# Patient Record
Sex: FEMALE | Race: WHITE | NOT HISPANIC OR LATINO | Employment: FULL TIME | URBAN - METROPOLITAN AREA
[De-identification: names, ages, dates, MRNs, and addresses within clinical notes are randomized per-mention and may not be internally consistent; named-entity substitution may affect disease eponyms.]

---

## 2024-06-10 ENCOUNTER — HOSPITAL ENCOUNTER (EMERGENCY)
Facility: HOSPITAL | Age: 34
Discharge: LEFT BEFORE DISCHARGE | End: 2024-06-10
Attending: EMERGENCY MEDICINE
Payer: COMMERCIAL

## 2024-06-10 ENCOUNTER — APPOINTMENT (OUTPATIENT)
Dept: RADIOLOGY | Facility: HOSPITAL | Age: 34
End: 2024-06-10
Payer: COMMERCIAL

## 2024-06-10 ENCOUNTER — APPOINTMENT (OUTPATIENT)
Dept: CARDIOLOGY | Facility: HOSPITAL | Age: 34
End: 2024-06-10
Payer: COMMERCIAL

## 2024-06-10 VITALS
HEIGHT: 64 IN | WEIGHT: 138.23 LBS | RESPIRATION RATE: 18 BRPM | SYSTOLIC BLOOD PRESSURE: 157 MMHG | DIASTOLIC BLOOD PRESSURE: 97 MMHG | OXYGEN SATURATION: 100 % | TEMPERATURE: 97.7 F | BODY MASS INDEX: 23.6 KG/M2 | HEART RATE: 69 BPM

## 2024-06-10 DIAGNOSIS — R10.30 LOWER ABDOMINAL PAIN: Primary | ICD-10-CM

## 2024-06-10 DIAGNOSIS — M54.50 ACUTE BILATERAL LOW BACK PAIN WITHOUT SCIATICA: ICD-10-CM

## 2024-06-10 LAB
ALBUMIN SERPL BCP-MCNC: 4.7 G/DL (ref 3.4–5)
ALP SERPL-CCNC: 117 U/L (ref 33–110)
ALT SERPL W P-5'-P-CCNC: 22 U/L (ref 7–45)
ANION GAP SERPL CALC-SCNC: 14 MMOL/L (ref 10–20)
APPEARANCE UR: CLEAR
AST SERPL W P-5'-P-CCNC: 39 U/L (ref 9–39)
BASOPHILS # BLD AUTO: 0.03 X10*3/UL (ref 0–0.1)
BASOPHILS NFR BLD AUTO: 0.3 %
BILIRUB SERPL-MCNC: 1.3 MG/DL (ref 0–1.2)
BILIRUB UR STRIP.AUTO-MCNC: NEGATIVE MG/DL
BUN SERPL-MCNC: 14 MG/DL (ref 6–23)
CALCIUM SERPL-MCNC: 9.2 MG/DL (ref 8.6–10.3)
CHLORIDE SERPL-SCNC: 102 MMOL/L (ref 98–107)
CO2 SERPL-SCNC: 24 MMOL/L (ref 21–32)
COLOR UR: YELLOW
CREAT SERPL-MCNC: 0.59 MG/DL (ref 0.5–1.05)
EGFRCR SERPLBLD CKD-EPI 2021: >90 ML/MIN/1.73M*2
EOSINOPHIL # BLD AUTO: 0.03 X10*3/UL (ref 0–0.7)
EOSINOPHIL NFR BLD AUTO: 0.3 %
ERYTHROCYTE [DISTWIDTH] IN BLOOD BY AUTOMATED COUNT: 20.5 % (ref 11.5–14.5)
GLUCOSE SERPL-MCNC: 97 MG/DL (ref 74–99)
GLUCOSE UR STRIP.AUTO-MCNC: ABNORMAL MG/DL
HCG UR QL IA.RAPID: NEGATIVE
HCT VFR BLD AUTO: 34.5 % (ref 36–46)
HGB BLD-MCNC: 9.8 G/DL (ref 12–16)
HOLD SPECIMEN: NORMAL
IMM GRANULOCYTES # BLD AUTO: 0.05 X10*3/UL (ref 0–0.7)
IMM GRANULOCYTES NFR BLD AUTO: 0.4 % (ref 0–0.9)
KETONES UR STRIP.AUTO-MCNC: NEGATIVE MG/DL
LEUKOCYTE ESTERASE UR QL STRIP.AUTO: NEGATIVE
LIPASE SERPL-CCNC: 10 U/L (ref 9–82)
LYMPHOCYTES # BLD AUTO: 0.98 X10*3/UL (ref 1.2–4.8)
LYMPHOCYTES NFR BLD AUTO: 8.3 %
MCH RBC QN AUTO: 17.9 PG (ref 26–34)
MCHC RBC AUTO-ENTMCNC: 28.4 G/DL (ref 32–36)
MCV RBC AUTO: 63 FL (ref 80–100)
MONOCYTES # BLD AUTO: 0.7 X10*3/UL (ref 0.1–1)
MONOCYTES NFR BLD AUTO: 5.9 %
MUCOUS THREADS #/AREA URNS AUTO: ABNORMAL /LPF
NEUTROPHILS # BLD AUTO: 10.07 X10*3/UL (ref 1.2–7.7)
NEUTROPHILS NFR BLD AUTO: 84.8 %
NITRITE UR QL STRIP.AUTO: NEGATIVE
NRBC BLD-RTO: 0 /100 WBCS (ref 0–0)
OVALOCYTES BLD QL SMEAR: NORMAL
PH UR STRIP.AUTO: 5.5 [PH]
PLATELET # BLD AUTO: 307 X10*3/UL (ref 150–450)
POLYCHROMASIA BLD QL SMEAR: NORMAL
POTASSIUM SERPL-SCNC: 4.1 MMOL/L (ref 3.5–5.3)
PROT SERPL-MCNC: 8.5 G/DL (ref 6.4–8.2)
PROT UR STRIP.AUTO-MCNC: ABNORMAL MG/DL
RBC # BLD AUTO: 5.47 X10*6/UL (ref 4–5.2)
RBC # UR STRIP.AUTO: NEGATIVE /UL
RBC #/AREA URNS AUTO: >20 /HPF
RBC MORPH BLD: NORMAL
SODIUM SERPL-SCNC: 136 MMOL/L (ref 136–145)
SP GR UR STRIP.AUTO: 1.04
SQUAMOUS #/AREA URNS AUTO: ABNORMAL /HPF
UROBILINOGEN UR STRIP.AUTO-MCNC: NORMAL MG/DL
WBC # BLD AUTO: 11.9 X10*3/UL (ref 4.4–11.3)
WBC #/AREA URNS AUTO: ABNORMAL /HPF

## 2024-06-10 PROCEDURE — 2500000004 HC RX 250 GENERAL PHARMACY W/ HCPCS (ALT 636 FOR OP/ED)

## 2024-06-10 PROCEDURE — 36415 COLL VENOUS BLD VENIPUNCTURE: CPT

## 2024-06-10 PROCEDURE — 81001 URINALYSIS AUTO W/SCOPE: CPT

## 2024-06-10 PROCEDURE — 83690 ASSAY OF LIPASE: CPT

## 2024-06-10 PROCEDURE — 85025 COMPLETE CBC W/AUTO DIFF WBC: CPT

## 2024-06-10 PROCEDURE — 81025 URINE PREGNANCY TEST: CPT

## 2024-06-10 PROCEDURE — 93005 ELECTROCARDIOGRAM TRACING: CPT

## 2024-06-10 PROCEDURE — 80053 COMPREHEN METABOLIC PANEL: CPT

## 2024-06-10 PROCEDURE — 99284 EMERGENCY DEPT VISIT MOD MDM: CPT | Mod: 25

## 2024-06-10 PROCEDURE — 36415 COLL VENOUS BLD VENIPUNCTURE: CPT | Performed by: EMERGENCY MEDICINE

## 2024-06-10 PROCEDURE — 99284 EMERGENCY DEPT VISIT MOD MDM: CPT | Performed by: EMERGENCY MEDICINE

## 2024-06-10 PROCEDURE — 96374 THER/PROPH/DIAG INJ IV PUSH: CPT

## 2024-06-10 RX ORDER — KETOROLAC TROMETHAMINE 15 MG/ML
15 INJECTION, SOLUTION INTRAMUSCULAR; INTRAVENOUS ONCE
Status: DISCONTINUED | OUTPATIENT
Start: 2024-06-10 | End: 2024-06-10

## 2024-06-10 RX ORDER — KETOROLAC TROMETHAMINE 15 MG/ML
15 INJECTION, SOLUTION INTRAMUSCULAR; INTRAVENOUS ONCE
Status: COMPLETED | OUTPATIENT
Start: 2024-06-10 | End: 2024-06-10

## 2024-06-10 RX ADMIN — KETOROLAC TROMETHAMINE 15 MG: 15 INJECTION, SOLUTION INTRAMUSCULAR; INTRAVENOUS at 20:22

## 2024-06-10 ASSESSMENT — PAIN DESCRIPTION - ORIENTATION
ORIENTATION: LOWER;MID;POSTERIOR
ORIENTATION: LOWER;MID
ORIENTATION: MID

## 2024-06-10 ASSESSMENT — PAIN SCALES - GENERAL
PAINLEVEL_OUTOF10: 8
PAINLEVEL_OUTOF10: 10 - WORST POSSIBLE PAIN
PAINLEVEL_OUTOF10: 8

## 2024-06-10 ASSESSMENT — PAIN DESCRIPTION - LOCATION
LOCATION: ABDOMEN
LOCATION: BACK
LOCATION: ABDOMEN

## 2024-06-10 ASSESSMENT — PAIN DESCRIPTION - PAIN TYPE
TYPE: ACUTE PAIN
TYPE: ACUTE PAIN

## 2024-06-10 ASSESSMENT — PAIN DESCRIPTION - PROGRESSION
CLINICAL_PROGRESSION: RAPIDLY WORSENING
CLINICAL_PROGRESSION: GRADUALLY WORSENING

## 2024-06-10 ASSESSMENT — COLUMBIA-SUICIDE SEVERITY RATING SCALE - C-SSRS
1. IN THE PAST MONTH, HAVE YOU WISHED YOU WERE DEAD OR WISHED YOU COULD GO TO SLEEP AND NOT WAKE UP?: NO
6. HAVE YOU EVER DONE ANYTHING, STARTED TO DO ANYTHING, OR PREPARED TO DO ANYTHING TO END YOUR LIFE?: NO
2. HAVE YOU ACTUALLY HAD ANY THOUGHTS OF KILLING YOURSELF?: NO

## 2024-06-10 ASSESSMENT — PAIN - FUNCTIONAL ASSESSMENT
PAIN_FUNCTIONAL_ASSESSMENT: 0-10
PAIN_FUNCTIONAL_ASSESSMENT: 0-10

## 2024-06-10 ASSESSMENT — PAIN DESCRIPTION - FREQUENCY
FREQUENCY: CONSTANT/CONTINUOUS
FREQUENCY: CONSTANT/CONTINUOUS

## 2024-06-10 ASSESSMENT — PAIN DESCRIPTION - DESCRIPTORS
DESCRIPTORS: CRAMPING;SHARP;SPASM
DESCRIPTORS: ACHING;SHARP

## 2024-06-10 ASSESSMENT — PAIN DESCRIPTION - DIRECTION: RADIATING_TOWARDS: SIDES

## 2024-06-10 ASSESSMENT — PAIN DESCRIPTION - ONSET
ONSET: PROGRESSIVE
ONSET: GRADUAL

## 2024-06-10 NOTE — ED PROVIDER NOTES
EMERGENCY DEPARTMENT ENCOUNTER      Pt Name: Shreya Garces  MRN: [id number]  Birthdate 1990  Date of evaluation: 6/10/2024  Provider: Derek Driscoll MD    CHIEF COMPLAINT       Chief Complaint   Patient presents with    Back Pain     Pt states she was recently traveling, she returned today.  When she was at the airport waiting on her return flight she began having low back pain that has gradually worsened today and is not making her stomach upset         HISTORY OF PRESENT ILLNESS    33-year-old female with history of gastric bypass, tummy tuck, arm lift, and panniculectomy presenting to the ED with complaint of abdominal pain.  She reports that she returned from [facility] today and at the airport at just after 2 PM she began developing lower abdominal pain that has been worsening with now associated low back pain at an 8.5/10 described as a dull ache with sharp pains intermittently.  She took Tylenol 2 hours ago without relief.  States that she experiences the low back pain when her blood pressure is elevated but not usually the abdominal pain.  LMP 5/20 and was normal.  States she did drink heavily yesterday but did not blackout.  No known trauma.  States she did have a normal bowel movement today without blood.  Also notes occasional THC use and vapes nicotine.  Denies IV drug use.  Denies nausea, vomiting, diarrhea, urinary symptoms, fevers, or URI symptoms.      History provided by:  Patient      Nursing Notes were reviewed.    PAST MEDICAL HISTORY     Past Medical History:   Diagnosis Date    Other conditions influencing health status     History of back problems    Personal history of other diseases of the circulatory system     History of essential hypertension    Personal history of other diseases of the nervous system and sense organs     History of migraine         SURGICAL HISTORY       Past Surgical History:   Procedure Laterality Date     SECTION, CLASSIC  2017     Section     MR HEAD ANGIO WO IV CONTRAST  2/26/2019    MR HEAD ANGIO WO IV CONTRAST 2/26/2019 [facility]         CURRENT MEDICATIONS       Previous Medications    No medications on file       ALLERGIES     Patient has no known allergies.    FAMILY HISTORY     No family history on file.       SOCIAL HISTORY       Social History     Socioeconomic History    Marital status: Single     Spouse name: None    Number of children: None    Years of education: None    Highest education level: None   Occupational History    None   Tobacco Use    Smoking status: Never    Smokeless tobacco: Never   Vaping Use    Vaping status: Every Day    Substances: Nicotine    Devices: Disposable   Substance and Sexual Activity    Alcohol use: Yes     Comment: Occasionally    Drug use: None     Comment: Occasionally    Sexual activity: Yes   Other Topics Concern    None   Social History Narrative    None     Social Determinants of Health     Financial Resource Strain: Low Risk  (5/17/2021)    Received from [facility]    Overall Financial Resource Strain (CARDIA)     Difficulty of Paying Living Expenses: Not very hard   Food Insecurity: Food Insecurity Present (5/17/2021)    Received from [facility]    Hunger Vital Sign     Worried About Running Out of Food in the Last Year: Never true     Ran Out of Food in the Last Year: Sometimes true   Transportation Needs: No Transportation Needs (5/17/2021)    Received from [facility]    PRAPARE - Transportation     Lack of Transportation (Medical): No     Lack of Transportation (Non-Medical): No   Physical Activity: Insufficiently Active (5/17/2021)    Received from [facility]    Exercise Vital Sign     Days of Exercise per Week: 1 day     Minutes of Exercise per Session: 30 min   Stress: No Stress Concern Present (5/17/2021)    Received from [facility]    [place] [employer] - Occupational Stress Questionnaire     Feeling of Stress : Only a little   Social Connections:  Moderately Integrated (5/17/2021)    Received from [facility]    Social Connection and Isolation Panel [NHANES]     Frequency of Communication with Friends and Family: More than three times a week     Frequency of Social Gatherings with Friends and Family: More than three times a week     Attends Jewish Services: More than 4 times per year     Active Member of Clubs or Organizations: No     Attends Club or Organization Meetings: Never     Marital Status: Living with partner   Intimate Partner Violence: Not At Risk (5/17/2021)    Received from [facility]    Humiliation, Afraid, Rape, and Kick questionnaire     Fear of Current or Ex-Partner: No     Emotionally Abused: No     Physically Abused: No     Sexually Abused: No   Housing Stability: Low Risk  (5/17/2021)    Received from [facility]    Housing Stability Vital Sign     Unable to Pay for Housing in the Last Year: No     Number of Places Lived in the Last Year: 1     Unstable Housing in the Last Year: No       SCREENINGS                        PHYSICAL EXAM    (up to 7 for level 4, 8 or more for level 5)     ED Triage Vitals [06/10/24 1908]   Temperature Heart Rate Respirations BP   36.5 °C (97.7 °F) 64 18 (!) 181/106      Pulse Ox Temp Source Heart Rate Source Patient Position   100 % Temporal Monitor Sitting      BP Location FiO2 (%)     Right arm --       Physical Exam  Vitals and nursing note reviewed.   Constitutional:       General: She is awake. She is not in acute distress.     Appearance: Normal appearance. She is well-developed.   HENT:      Head: Normocephalic and atraumatic.      Right Ear: External ear normal.      Left Ear: External ear normal.      Nose: Nose normal. No congestion or rhinorrhea.      Mouth/Throat:      Mouth: Mucous membranes are moist.      Pharynx: Oropharynx is clear. No posterior oropharyngeal erythema.   Eyes:      General: No scleral icterus.        Right eye: No discharge.         Left eye: No discharge.       Extraocular Movements: Extraocular movements intact.      Conjunctiva/sclera: Conjunctivae normal.   Cardiovascular:      Rate and Rhythm: Normal rate and regular rhythm.      Pulses: Normal pulses.      Heart sounds: Normal heart sounds. No murmur heard.     No friction rub.   Pulmonary:      Effort: Pulmonary effort is normal. No respiratory distress.      Breath sounds: Normal breath sounds. No stridor. No wheezing or rales.   Abdominal:      General: There is no distension.      Palpations: Abdomen is soft.      Tenderness: There is abdominal tenderness in the right lower quadrant, suprapubic area and left lower quadrant. There is left CVA tenderness. There is no right CVA tenderness, guarding or rebound.   Musculoskeletal:         General: No swelling or tenderness.      Cervical back: Normal range of motion and neck supple.      Right lower leg: No edema.      Left lower leg: No edema.   Skin:     General: Skin is warm and dry.      Capillary Refill: Capillary refill takes less than 2 seconds.      Coloration: Skin is not jaundiced or pale.      Findings: No lesion or rash.   Neurological:      General: No focal deficit present.      Mental Status: She is alert and oriented to person, place, and time. Mental status is at baseline.      Cranial Nerves: No cranial nerve deficit.      Sensory: No sensory deficit.      Motor: No weakness.   Psychiatric:         Mood and Affect: Mood normal.         Behavior: Behavior normal. Behavior is cooperative.         Thought Content: Thought content normal.         Judgment: Judgment normal.          DIAGNOSTIC RESULTS     LABS:  Labs Reviewed   LIPASE   URINALYSIS WITH REFLEX CULTURE AND MICROSCOPIC    Narrative:     The following orders were created for panel order Urinalysis with Reflex Culture and Microscopic.  Procedure                               Abnormality         Status                     ---------                               -----------         ------                      Urinalysis with Reflex C...[[id number]]                                                 Extra Urine Gray Tube[[id number]]                                                         Please view results for these tests on the individual orders.   COMPREHENSIVE METABOLIC PANEL   CBC WITH AUTO DIFFERENTIAL   HCG, URINE, QUALITATIVE   URINALYSIS WITH REFLEX CULTURE AND MICROSCOPIC   EXTRA URINE GRAY TUBE       All other labs were within normal range or not returned as of this dictation.    Imaging  CT abdomen pelvis w IV contrast    (Results Pending)        Procedures  Procedures     EMERGENCY DEPARTMENT COURSE/MDM:     Diagnoses as of 06/11/24 1229   Acute bilateral low back pain without sciatica   Lower abdominal pain        Medical Decision Making  33-year-old female with history of multiple abdominal surgeries presenting to the ED with complaint of lower abdominal pain after returning from a trip to [facility].  Patient is hypertensive at 181/106 but otherwise afebrile, saturating well on room air, and in no acute distress.  Abdominal workup ordered with CT abdomen pelvis due to multiple abdominal surgeries.  Pregnancy test negative. Patient provided Toradol.    Labs returned revealing microcytic anemia with Hgb of 9.8 and unknown baseline and mild leukocytosis but otherwise ANNE, hepatitis, pancreatitis, UTI, or electrolyte abnormalities. While awaiting transport for CT imaging patient became anxious and stated she would rather be at home where she can potentially manage her pain better.  States the Toradol provided did not help and she wants to leave at this time.    Patient endorses she would like to leave AGAINST MEDICAL ADVICE.  The patient is oriented to person, place, time, and demonstrating all key elements of capacity to make decisions regarding the medical care offered.  The patient speaks coherently and exhibits no evidence of having an altered level of consciousness or alcohol or drug  intoxication to a point that would impair ability to delineate a choice.  The patient demonstrates understanding and appreciation of the relevant information of the nature of their medical condition, as well as the risks, benefits, and treatment alternatives (including non-treatment), consequences of refusing care, and can appropriately communicate a rational reasoning about their choice of care options.  Patient is aware the suspected diagnosis suggested by history and exam. The patient demonstrates understanding and appreciation of the relevant information of the nature of their medical condition, as well as the risks, benefits, and treatment alternatives (including non-treatment), consequences of refusing care, and can appropriately communicate a rational reasoning about their choice of care options. The risks of refusing recommended care that were disclosed and acknowledged by the patient include death, neurologic dysfunction, permanent mental impairment, loss of limb, loss of current lifestyle, worsening of chronic condition, long-term disability. The patient understands they are welcome to return to the hospital at any time to receive the recommended care or any other care at any time, regardless of their ability to pay for such care. Discharge instructions were provided to the patient.    Patient and or family in agreement and understanding of treatment plan.  All questions answered.      I reviewed the case with the attending ED physician. The attending ED physician agrees with the plan. Patient and/or patient´s representative was counseled regarding labs, imaging, likely diagnosis, and plan. All questions were answered.    ED Medications administered this visit:    Medications   ketorolac (Toradol) injection 15 mg ( intravenous Dose Auto Held 6/10/24 1940)       New Prescriptions from this visit:    New Prescriptions    No medications on file       Follow-up:  No follow-up provider specified.      Final  Impression: No diagnosis found.      (Please note that portions of this note were completed with a voice recognition program.  Efforts were made to edit the dictations but occasionally words are mis-transcribed.)     Derek Driscoll MD  Resident  06/11/24 9229

## 2024-06-11 LAB — HOLD SPECIMEN: NORMAL

## 2024-06-12 LAB
ATRIAL RATE: 54 BPM
P AXIS: 41 DEGREES
P OFFSET: 182 MS
P ONSET: 134 MS
PR INTERVAL: 162 MS
Q ONSET: 215 MS
QRS COUNT: 9 BEATS
QRS DURATION: 94 MS
QT INTERVAL: 442 MS
QTC CALCULATION(BAZETT): 419 MS
QTC FREDERICIA: 426 MS
R AXIS: 57 DEGREES
T AXIS: 36 DEGREES
T OFFSET: 436 MS
VENTRICULAR RATE: 54 BPM

## 2025-02-10 ENCOUNTER — APPOINTMENT (OUTPATIENT)
Dept: RADIOLOGY | Facility: HOSPITAL | Age: 35
DRG: 091 | End: 2025-02-10

## 2025-02-10 ENCOUNTER — HOSPITAL ENCOUNTER (INPATIENT)
Facility: HOSPITAL | Age: 35
LOS: 1 days | Discharge: ANOTHER HOSPITAL | DRG: 091 | End: 2025-02-12
Attending: STUDENT IN AN ORGANIZED HEALTH CARE EDUCATION/TRAINING PROGRAM | Admitting: STUDENT IN AN ORGANIZED HEALTH CARE EDUCATION/TRAINING PROGRAM

## 2025-02-10 DIAGNOSIS — D50.9 IRON DEFICIENCY ANEMIA, UNSPECIFIED IRON DEFICIENCY ANEMIA TYPE: ICD-10-CM

## 2025-02-10 DIAGNOSIS — G08 DURAL VENOUS SINUS THROMBOSIS (HHS-HCC): Primary | ICD-10-CM

## 2025-02-10 DIAGNOSIS — R00.1 BRADYCARDIA: ICD-10-CM

## 2025-02-10 DIAGNOSIS — F41.1 GAD (GENERALIZED ANXIETY DISORDER): ICD-10-CM

## 2025-02-10 DIAGNOSIS — I63.81 LACUNAR STROKE (MULTI): ICD-10-CM

## 2025-02-10 PROBLEM — R73.03 PREDIABETES: Status: ACTIVE | Noted: 2019-08-16

## 2025-02-10 PROBLEM — G45.9 TRANSIENT ISCHEMIC ATTACK: Status: ACTIVE | Noted: 2018-07-25

## 2025-02-10 PROBLEM — E66.01 MORBID OBESITY (MULTI): Status: ACTIVE | Noted: 2025-02-10

## 2025-02-10 PROBLEM — G47.33 OSA (OBSTRUCTIVE SLEEP APNEA): Status: ACTIVE | Noted: 2020-07-23

## 2025-02-10 PROBLEM — K76.0 NAFLD (NONALCOHOLIC FATTY LIVER DISEASE): Status: ACTIVE | Noted: 2020-09-11

## 2025-02-10 PROBLEM — F17.200 CURRENT SMOKER: Status: ACTIVE | Noted: 2024-02-07

## 2025-02-10 PROBLEM — Z86.73 HISTORY OF LACUNAR CEREBROVASCULAR ACCIDENT (CVA): Status: ACTIVE | Noted: 2024-02-07

## 2025-02-10 PROBLEM — Z98.84 HISTORY OF GASTRIC BYPASS: Status: ACTIVE | Noted: 2021-10-19

## 2025-02-10 LAB
ALBUMIN SERPL BCP-MCNC: 3.5 G/DL (ref 3.4–5)
ALP SERPL-CCNC: 110 U/L (ref 33–110)
ALT SERPL W P-5'-P-CCNC: 29 U/L (ref 7–45)
ANION GAP SERPL CALC-SCNC: 14 MMOL/L (ref 10–20)
AST SERPL W P-5'-P-CCNC: 72 U/L (ref 9–39)
B-HCG SERPL-ACNC: <2 MIU/ML
BASOPHILS # BLD AUTO: 0.04 X10*3/UL (ref 0–0.1)
BASOPHILS NFR BLD AUTO: 0.2 %
BILIRUB SERPL-MCNC: 0.6 MG/DL (ref 0–1.2)
BUN SERPL-MCNC: 11 MG/DL (ref 6–23)
CALCIUM SERPL-MCNC: 8.5 MG/DL (ref 8.6–10.3)
CHLORIDE SERPL-SCNC: 103 MMOL/L (ref 98–107)
CO2 SERPL-SCNC: 26 MMOL/L (ref 21–32)
CREAT SERPL-MCNC: 0.47 MG/DL (ref 0.5–1.05)
EGFRCR SERPLBLD CKD-EPI 2021: >90 ML/MIN/1.73M*2
EOSINOPHIL # BLD AUTO: 0.03 X10*3/UL (ref 0–0.7)
EOSINOPHIL NFR BLD AUTO: 0.1 %
ERYTHROCYTE [DISTWIDTH] IN BLOOD BY AUTOMATED COUNT: 21.2 % (ref 11.5–14.5)
GLUCOSE SERPL-MCNC: 183 MG/DL (ref 74–99)
HCT VFR BLD AUTO: 26.7 % (ref 36–46)
HGB BLD-MCNC: 7.1 G/DL (ref 12–16)
IMM GRANULOCYTES # BLD AUTO: 0.17 X10*3/UL (ref 0–0.7)
IMM GRANULOCYTES NFR BLD AUTO: 0.8 % (ref 0–0.9)
LYMPHOCYTES # BLD AUTO: 1.18 X10*3/UL (ref 1.2–4.8)
LYMPHOCYTES NFR BLD AUTO: 5.5 %
MCH RBC QN AUTO: 16.7 PG (ref 26–34)
MCHC RBC AUTO-ENTMCNC: 26.6 G/DL (ref 32–36)
MCV RBC AUTO: 63 FL (ref 80–100)
MONOCYTES # BLD AUTO: 0.65 X10*3/UL (ref 0.1–1)
MONOCYTES NFR BLD AUTO: 3 %
NEUTROPHILS # BLD AUTO: 19.29 X10*3/UL (ref 1.2–7.7)
NEUTROPHILS NFR BLD AUTO: 90.4 %
NRBC BLD-RTO: 0 /100 WBCS (ref 0–0)
PLATELET # BLD AUTO: 595 X10*3/UL (ref 150–450)
POTASSIUM SERPL-SCNC: 3.4 MMOL/L (ref 3.5–5.3)
PROT SERPL-MCNC: 6.9 G/DL (ref 6.4–8.2)
RBC # BLD AUTO: 4.25 X10*6/UL (ref 4–5.2)
RBC MORPH BLD: NORMAL
SODIUM SERPL-SCNC: 140 MMOL/L (ref 136–145)
STOMATOCYTES BLD QL SMEAR: NORMAL
WBC # BLD AUTO: 21.4 X10*3/UL (ref 4.4–11.3)

## 2025-02-10 PROCEDURE — 99285 EMERGENCY DEPT VISIT HI MDM: CPT | Mod: 25 | Performed by: STUDENT IN AN ORGANIZED HEALTH CARE EDUCATION/TRAINING PROGRAM

## 2025-02-10 PROCEDURE — 36415 COLL VENOUS BLD VENIPUNCTURE: CPT | Performed by: EMERGENCY MEDICINE

## 2025-02-10 PROCEDURE — 96361 HYDRATE IV INFUSION ADD-ON: CPT

## 2025-02-10 PROCEDURE — 70450 CT HEAD/BRAIN W/O DYE: CPT | Performed by: RADIOLOGY

## 2025-02-10 PROCEDURE — 85045 AUTOMATED RETICULOCYTE COUNT: CPT | Performed by: EMERGENCY MEDICINE

## 2025-02-10 PROCEDURE — 70450 CT HEAD/BRAIN W/O DYE: CPT

## 2025-02-10 PROCEDURE — 2500000004 HC RX 250 GENERAL PHARMACY W/ HCPCS (ALT 636 FOR OP/ED): Performed by: EMERGENCY MEDICINE

## 2025-02-10 PROCEDURE — 2500000001 HC RX 250 WO HCPCS SELF ADMINISTERED DRUGS (ALT 637 FOR MEDICARE OP): Performed by: EMERGENCY MEDICINE

## 2025-02-10 PROCEDURE — 2500000004 HC RX 250 GENERAL PHARMACY W/ HCPCS (ALT 636 FOR OP/ED): Performed by: STUDENT IN AN ORGANIZED HEALTH CARE EDUCATION/TRAINING PROGRAM

## 2025-02-10 PROCEDURE — 85025 COMPLETE CBC W/AUTO DIFF WBC: CPT | Performed by: EMERGENCY MEDICINE

## 2025-02-10 PROCEDURE — 99291 CRITICAL CARE FIRST HOUR: CPT | Performed by: STUDENT IN AN ORGANIZED HEALTH CARE EDUCATION/TRAINING PROGRAM

## 2025-02-10 PROCEDURE — 99291 CRITICAL CARE FIRST HOUR: CPT | Mod: 25 | Performed by: STUDENT IN AN ORGANIZED HEALTH CARE EDUCATION/TRAINING PROGRAM

## 2025-02-10 PROCEDURE — 84702 CHORIONIC GONADOTROPIN TEST: CPT | Performed by: EMERGENCY MEDICINE

## 2025-02-10 PROCEDURE — 96365 THER/PROPH/DIAG IV INF INIT: CPT

## 2025-02-10 PROCEDURE — 80053 COMPREHEN METABOLIC PANEL: CPT | Performed by: EMERGENCY MEDICINE

## 2025-02-10 PROCEDURE — 96375 TX/PRO/DX INJ NEW DRUG ADDON: CPT

## 2025-02-10 PROCEDURE — 83615 LACTATE (LD) (LDH) ENZYME: CPT | Performed by: EMERGENCY MEDICINE

## 2025-02-10 RX ORDER — PROCHLORPERAZINE EDISYLATE 5 MG/ML
10 INJECTION INTRAMUSCULAR; INTRAVENOUS ONCE
Status: COMPLETED | OUTPATIENT
Start: 2025-02-10 | End: 2025-02-10

## 2025-02-10 RX ORDER — AMOXICILLIN AND CLAVULANATE POTASSIUM 875; 125 MG/1; MG/1
1 TABLET, FILM COATED ORAL ONCE
Status: DISCONTINUED | OUTPATIENT
Start: 2025-02-10 | End: 2025-02-11

## 2025-02-10 RX ORDER — MAGNESIUM SULFATE 1 G/100ML
1 INJECTION INTRAVENOUS ONCE
Status: COMPLETED | OUTPATIENT
Start: 2025-02-10 | End: 2025-02-11

## 2025-02-10 RX ORDER — DIPHENHYDRAMINE HCL 25 MG
25 TABLET ORAL ONCE
Status: DISCONTINUED | OUTPATIENT
Start: 2025-02-10 | End: 2025-02-10

## 2025-02-10 RX ORDER — KETOROLAC TROMETHAMINE 30 MG/ML
30 INJECTION, SOLUTION INTRAMUSCULAR; INTRAVENOUS ONCE
Status: DISCONTINUED | OUTPATIENT
Start: 2025-02-10 | End: 2025-02-10

## 2025-02-10 RX ORDER — DIPHENHYDRAMINE HCL 25 MG
12.5 TABLET ORAL ONCE
Status: COMPLETED | OUTPATIENT
Start: 2025-02-10 | End: 2025-02-10

## 2025-02-10 RX ADMIN — DEXAMETHASONE SODIUM PHOSPHATE 4 MG: 4 INJECTION, SOLUTION INTRAMUSCULAR; INTRAVENOUS at 23:40

## 2025-02-10 RX ADMIN — SODIUM CHLORIDE 1000 ML: 9 INJECTION, SOLUTION INTRAVENOUS at 22:39

## 2025-02-10 RX ADMIN — MAGNESIUM SULFATE IN DEXTROSE 1 G: 10 INJECTION, SOLUTION INTRAVENOUS at 23:40

## 2025-02-10 RX ADMIN — DIPHENHYDRAMINE HYDROCHLORIDE 12.5 MG: 25 TABLET ORAL at 22:39

## 2025-02-10 RX ADMIN — PROCHLORPERAZINE EDISYLATE 10 MG: 5 INJECTION INTRAMUSCULAR; INTRAVENOUS at 22:39

## 2025-02-10 ASSESSMENT — COLUMBIA-SUICIDE SEVERITY RATING SCALE - C-SSRS
6. HAVE YOU EVER DONE ANYTHING, STARTED TO DO ANYTHING, OR PREPARED TO DO ANYTHING TO END YOUR LIFE?: NO
1. IN THE PAST MONTH, HAVE YOU WISHED YOU WERE DEAD OR WISHED YOU COULD GO TO SLEEP AND NOT WAKE UP?: NO
2. HAVE YOU ACTUALLY HAD ANY THOUGHTS OF KILLING YOURSELF?: NO

## 2025-02-10 ASSESSMENT — PAIN - FUNCTIONAL ASSESSMENT: PAIN_FUNCTIONAL_ASSESSMENT: FLACC (FACE, LEGS, ACTIVITY, CRY, CONSOLABILITY)

## 2025-02-11 ENCOUNTER — APPOINTMENT (OUTPATIENT)
Dept: CARDIOLOGY | Facility: HOSPITAL | Age: 35
DRG: 091 | End: 2025-02-11

## 2025-02-11 ENCOUNTER — APPOINTMENT (OUTPATIENT)
Dept: RADIOLOGY | Facility: HOSPITAL | Age: 35
DRG: 091 | End: 2025-02-11

## 2025-02-11 PROBLEM — G08 DURAL VENOUS SINUS THROMBOSIS (HHS-HCC): Status: ACTIVE | Noted: 2025-02-11

## 2025-02-11 LAB
ABO GROUP (TYPE) IN BLOOD: NORMAL
ABO GROUP (TYPE) IN BLOOD: NORMAL
ACANTHOCYTES BLD QL SMEAR: ABNORMAL
ALBUMIN SERPL BCP-MCNC: 3.4 G/DL (ref 3.4–5)
ANION GAP SERPL CALC-SCNC: 16 MMOL/L (ref 10–20)
ANTIBODY SCREEN: NORMAL
APTT PPP: 29 SECONDS (ref 27–38)
BASOPHILS # BLD MANUAL: 0 X10*3/UL (ref 0–0.1)
BASOPHILS NFR BLD MANUAL: 0 %
BLOOD EXPIRATION DATE: NORMAL
BNP SERPL-MCNC: 178 PG/ML (ref 0–99)
BUN SERPL-MCNC: 7 MG/DL (ref 6–23)
CALCIUM SERPL-MCNC: 8.7 MG/DL (ref 8.6–10.3)
CARDIAC TROPONIN I PNL SERPL HS: 4 NG/L (ref 0–13)
CENTROMERE B AB SER-ACNC: <0.2 AI
CHLORIDE SERPL-SCNC: 104 MMOL/L (ref 98–107)
CHROMATIN AB SERPL-ACNC: <0.2 AI
CO2 SERPL-SCNC: 24 MMOL/L (ref 21–32)
CREAT SERPL-MCNC: 0.43 MG/DL (ref 0.5–1.05)
CRP SERPL-MCNC: 0.89 MG/DL
DISPENSE STATUS: NORMAL
DSDNA AB SER-ACNC: <1 IU/ML
EGFRCR SERPLBLD CKD-EPI 2021: >90 ML/MIN/1.73M*2
ENA JO1 AB SER QL IA: <0.2 AI
ENA RNP AB SER IA-ACNC: <0.2 AI
ENA SCL70 AB SER QL IA: <0.2 AI
ENA SM AB SER IA-ACNC: <0.2 AI
ENA SM+RNP AB SER QL IA: <0.2 AI
ENA SS-A AB SER IA-ACNC: <0.2 AI
ENA SS-B AB SER IA-ACNC: <0.2 AI
EOSINOPHIL # BLD MANUAL: 0 X10*3/UL (ref 0–0.7)
EOSINOPHIL NFR BLD MANUAL: 0 %
ERYTHROCYTE [DISTWIDTH] IN BLOOD BY AUTOMATED COUNT: 21.1 % (ref 11.5–14.5)
ERYTHROCYTE [SEDIMENTATION RATE] IN BLOOD BY WESTERGREN METHOD: 52 MM/H (ref 0–20)
FACT VIII ACT/NOR PPP: 226 % (ref 55–180)
FERRITIN SERPL-MCNC: 14 NG/ML (ref 8–150)
FOLATE SERPL-MCNC: 4.8 NG/ML
FOLATE SERPL-MCNC: 6 NG/ML
GIANT PLATELETS BLD QL SMEAR: ABNORMAL
GLUCOSE SERPL-MCNC: 138 MG/DL (ref 74–99)
HAPTOGLOB SERPL NEPH-MCNC: 340 MG/DL (ref 30–200)
HCT VFR BLD AUTO: 24.9 % (ref 36–46)
HCT VFR BLD AUTO: 25.1 % (ref 36–46)
HCYS SERPL-SCNC: 5.72 UMOL/L (ref 5–13.9)
HGB BLD-MCNC: 6.6 G/DL (ref 12–16)
HGB BLD-MCNC: 6.8 G/DL (ref 12–16)
HGB RETIC QN: 19 PG (ref 28–38)
HGB RETIC QN: 19 PG (ref 28–38)
HOLD SPECIMEN: NORMAL
HYPOCHROMIA BLD QL SMEAR: ABNORMAL
IMM GRANULOCYTES # BLD AUTO: 0.14 X10*3/UL (ref 0–0.7)
IMM GRANULOCYTES NFR BLD AUTO: 0.8 % (ref 0–0.9)
IMMATURE RETIC FRACTION: 22 %
IMMATURE RETIC FRACTION: 27.4 %
INR PPP: 1.1 (ref 0.9–1.1)
IRON SATN MFR SERPL: ABNORMAL %
IRON SERPL-MCNC: <10 UG/DL (ref 35–150)
LACTATE SERPL-SCNC: 2 MMOL/L (ref 0.4–2)
LDH SERPL L TO P-CCNC: 224 U/L (ref 84–246)
LYMPHOCYTES # BLD MANUAL: 1.23 X10*3/UL (ref 1.2–4.8)
LYMPHOCYTES NFR BLD MANUAL: 7 %
MCH RBC QN AUTO: 16.6 PG (ref 26–34)
MCHC RBC AUTO-ENTMCNC: 26.5 G/DL (ref 32–36)
MCV RBC AUTO: 63 FL (ref 80–100)
MONOCYTES # BLD MANUAL: 0.35 X10*3/UL (ref 0.1–1)
MONOCYTES NFR BLD MANUAL: 2 %
NEUTROPHILS # BLD MANUAL: 16.01 X10*3/UL (ref 1.2–7.7)
NEUTS BAND # BLD MANUAL: 0.35 X10*3/UL (ref 0–0.7)
NEUTS BAND NFR BLD MANUAL: 2 %
NEUTS SEG # BLD MANUAL: 15.66 X10*3/UL (ref 1.2–7)
NEUTS SEG NFR BLD MANUAL: 89 %
NRBC BLD-RTO: 0 /100 WBCS (ref 0–0)
PHOSPHATE SERPL-MCNC: 3 MG/DL (ref 2.5–4.9)
PLATELET # BLD AUTO: 549 X10*3/UL (ref 150–450)
POLYCHROMASIA BLD QL SMEAR: ABNORMAL
POTASSIUM SERPL-SCNC: 4 MMOL/L (ref 3.5–5.3)
PRODUCT BLOOD TYPE: 5100
PRODUCT CODE: NORMAL
PROT C ACT/NOR PPP CHRO: 79 % ACTIVITY (ref 70–150)
PROT S ACT/NOR PPP: 94 % ACTIVITY (ref 64–150)
PROTHROMBIN TIME: 12.7 SECONDS (ref 9.8–12.8)
RBC # BLD AUTO: 3.98 X10*6/UL (ref 4–5.2)
RBC MORPH BLD: ABNORMAL
RETICS #: 0.08 X10*6/UL (ref 0.02–0.08)
RETICS #: 0.08 X10*6/UL (ref 0.02–0.08)
RETICS/RBC NFR AUTO: 1.8 % (ref 0.5–2)
RETICS/RBC NFR AUTO: 2 % (ref 0.5–2)
RH FACTOR (ANTIGEN D): NORMAL
RH FACTOR (ANTIGEN D): NORMAL
RIBOSOMAL P AB SER-ACNC: <0.2 AI
SODIUM SERPL-SCNC: 140 MMOL/L (ref 136–145)
STOMATOCYTES BLD QL SMEAR: ABNORMAL
TIBC SERPL-MCNC: ABNORMAL UG/DL
TOTAL CELLS COUNTED BLD: 100
UFH PPP CHRO-ACNC: 0.4 IU/ML
UFH PPP CHRO-ACNC: 0.5 IU/ML
UFH PPP CHRO-ACNC: 0.8 IU/ML
UIBC SERPL-MCNC: 359 UG/DL (ref 110–370)
UNIT ABO: NORMAL
UNIT NUMBER: NORMAL
UNIT RH: NORMAL
UNIT VOLUME: 350
VIT B12 SERPL-MCNC: 424 PG/ML (ref 211–911)
WBC # BLD AUTO: 17.6 X10*3/UL (ref 4.4–11.3)
XM INTEP: NORMAL

## 2025-02-11 PROCEDURE — 2060000001 HC INTERMEDIATE ICU ROOM DAILY

## 2025-02-11 PROCEDURE — 70553 MRI BRAIN STEM W/O & W/DYE: CPT | Performed by: RADIOLOGY

## 2025-02-11 PROCEDURE — 2500000004 HC RX 250 GENERAL PHARMACY W/ HCPCS (ALT 636 FOR OP/ED)

## 2025-02-11 PROCEDURE — 93306 TTE W/DOPPLER COMPLETE: CPT

## 2025-02-11 PROCEDURE — 85027 COMPLETE CBC AUTOMATED: CPT

## 2025-02-11 PROCEDURE — 82746 ASSAY OF FOLIC ACID SERUM: CPT | Mod: STJLAB

## 2025-02-11 PROCEDURE — 96375 TX/PRO/DX INJ NEW DRUG ADDON: CPT

## 2025-02-11 PROCEDURE — A9575 INJ GADOTERATE MEGLUMI 0.1ML: HCPCS | Performed by: STUDENT IN AN ORGANIZED HEALTH CARE EDUCATION/TRAINING PROGRAM

## 2025-02-11 PROCEDURE — 2500000005 HC RX 250 GENERAL PHARMACY W/O HCPCS: Performed by: STUDENT IN AN ORGANIZED HEALTH CARE EDUCATION/TRAINING PROGRAM

## 2025-02-11 PROCEDURE — 85018 HEMOGLOBIN: CPT

## 2025-02-11 PROCEDURE — 85007 BL SMEAR W/DIFF WBC COUNT: CPT

## 2025-02-11 PROCEDURE — 96367 TX/PROPH/DG ADDL SEQ IV INF: CPT

## 2025-02-11 PROCEDURE — 2500000001 HC RX 250 WO HCPCS SELF ADMINISTERED DRUGS (ALT 637 FOR MEDICARE OP)

## 2025-02-11 PROCEDURE — 85045 AUTOMATED RETICULOCYTE COUNT: CPT | Performed by: STUDENT IN AN ORGANIZED HEALTH CARE EDUCATION/TRAINING PROGRAM

## 2025-02-11 PROCEDURE — 36430 TRANSFUSION BLD/BLD COMPNT: CPT

## 2025-02-11 PROCEDURE — 70496 CT ANGIOGRAPHY HEAD: CPT

## 2025-02-11 PROCEDURE — 2500000001 HC RX 250 WO HCPCS SELF ADMINISTERED DRUGS (ALT 637 FOR MEDICARE OP): Performed by: STUDENT IN AN ORGANIZED HEALTH CARE EDUCATION/TRAINING PROGRAM

## 2025-02-11 PROCEDURE — 87389 HIV-1 AG W/HIV-1&-2 AB AG IA: CPT | Mod: STJLAB | Performed by: INTERNAL MEDICINE

## 2025-02-11 PROCEDURE — 81270 JAK2 GENE: CPT | Performed by: EMERGENCY MEDICINE

## 2025-02-11 PROCEDURE — 70553 MRI BRAIN STEM W/O & W/DYE: CPT

## 2025-02-11 PROCEDURE — 2500000004 HC RX 250 GENERAL PHARMACY W/ HCPCS (ALT 636 FOR OP/ED): Performed by: EMERGENCY MEDICINE

## 2025-02-11 PROCEDURE — 82728 ASSAY OF FERRITIN: CPT

## 2025-02-11 PROCEDURE — 81241 F5 GENE: CPT | Performed by: EMERGENCY MEDICINE

## 2025-02-11 PROCEDURE — 85520 HEPARIN ASSAY: CPT | Performed by: EMERGENCY MEDICINE

## 2025-02-11 PROCEDURE — 86923 COMPATIBILITY TEST ELECTRIC: CPT

## 2025-02-11 PROCEDURE — 80069 RENAL FUNCTION PANEL: CPT

## 2025-02-11 PROCEDURE — 2500000004 HC RX 250 GENERAL PHARMACY W/ HCPCS (ALT 636 FOR OP/ED): Mod: JZ

## 2025-02-11 PROCEDURE — 86901 BLOOD TYPING SEROLOGIC RH(D): CPT | Performed by: STUDENT IN AN ORGANIZED HEALTH CARE EDUCATION/TRAINING PROGRAM

## 2025-02-11 PROCEDURE — 85610 PROTHROMBIN TIME: CPT | Performed by: STUDENT IN AN ORGANIZED HEALTH CARE EDUCATION/TRAINING PROGRAM

## 2025-02-11 PROCEDURE — 83090 ASSAY OF HOMOCYSTEINE: CPT | Mod: STJLAB | Performed by: EMERGENCY MEDICINE

## 2025-02-11 PROCEDURE — 85613 RUSSELL VIPER VENOM DILUTED: CPT | Mod: STJLAB | Performed by: EMERGENCY MEDICINE

## 2025-02-11 PROCEDURE — 83880 ASSAY OF NATRIURETIC PEPTIDE: CPT | Performed by: STUDENT IN AN ORGANIZED HEALTH CARE EDUCATION/TRAINING PROGRAM

## 2025-02-11 PROCEDURE — 76506 ECHO EXAM OF HEAD: CPT | Performed by: RADIOLOGY

## 2025-02-11 PROCEDURE — 2550000001 HC RX 255 CONTRASTS: Performed by: STUDENT IN AN ORGANIZED HEALTH CARE EDUCATION/TRAINING PROGRAM

## 2025-02-11 PROCEDURE — 99223 1ST HOSP IP/OBS HIGH 75: CPT | Performed by: SPECIALIST

## 2025-02-11 PROCEDURE — 85652 RBC SED RATE AUTOMATED: CPT | Performed by: STUDENT IN AN ORGANIZED HEALTH CARE EDUCATION/TRAINING PROGRAM

## 2025-02-11 PROCEDURE — 85520 HEPARIN ASSAY: CPT | Performed by: STUDENT IN AN ORGANIZED HEALTH CARE EDUCATION/TRAINING PROGRAM

## 2025-02-11 PROCEDURE — 84484 ASSAY OF TROPONIN QUANT: CPT | Performed by: STUDENT IN AN ORGANIZED HEALTH CARE EDUCATION/TRAINING PROGRAM

## 2025-02-11 PROCEDURE — 82607 VITAMIN B-12: CPT | Mod: STJLAB

## 2025-02-11 PROCEDURE — 85306 CLOT INHIBIT PROT S FREE: CPT | Mod: STJLAB | Performed by: EMERGENCY MEDICINE

## 2025-02-11 PROCEDURE — P9016 RBC LEUKOCYTES REDUCED: HCPCS

## 2025-02-11 PROCEDURE — 86140 C-REACTIVE PROTEIN: CPT | Performed by: STUDENT IN AN ORGANIZED HEALTH CARE EDUCATION/TRAINING PROGRAM

## 2025-02-11 PROCEDURE — 83605 ASSAY OF LACTIC ACID: CPT | Performed by: EMERGENCY MEDICINE

## 2025-02-11 PROCEDURE — 36415 COLL VENOUS BLD VENIPUNCTURE: CPT | Performed by: STUDENT IN AN ORGANIZED HEALTH CARE EDUCATION/TRAINING PROGRAM

## 2025-02-11 PROCEDURE — 99222 1ST HOSP IP/OBS MODERATE 55: CPT | Performed by: PHYSICIAN ASSISTANT

## 2025-02-11 PROCEDURE — 85303 CLOT INHIBIT PROT C ACTIVITY: CPT | Mod: STJLAB | Performed by: EMERGENCY MEDICINE

## 2025-02-11 PROCEDURE — 86038 ANTINUCLEAR ANTIBODIES: CPT | Mod: STJLAB | Performed by: STUDENT IN AN ORGANIZED HEALTH CARE EDUCATION/TRAINING PROGRAM

## 2025-02-11 PROCEDURE — 81240 F2 GENE: CPT | Performed by: EMERGENCY MEDICINE

## 2025-02-11 PROCEDURE — 96376 TX/PRO/DX INJ SAME DRUG ADON: CPT

## 2025-02-11 PROCEDURE — 2500000004 HC RX 250 GENERAL PHARMACY W/ HCPCS (ALT 636 FOR OP/ED): Performed by: STUDENT IN AN ORGANIZED HEALTH CARE EDUCATION/TRAINING PROGRAM

## 2025-02-11 PROCEDURE — 96365 THER/PROPH/DIAG IV INF INIT: CPT

## 2025-02-11 PROCEDURE — 2500000004 HC RX 250 GENERAL PHARMACY W/ HCPCS (ALT 636 FOR OP/ED): Mod: JZ | Performed by: STUDENT IN AN ORGANIZED HEALTH CARE EDUCATION/TRAINING PROGRAM

## 2025-02-11 PROCEDURE — 86036 ANCA SCREEN EACH ANTIBODY: CPT | Performed by: STUDENT IN AN ORGANIZED HEALTH CARE EDUCATION/TRAINING PROGRAM

## 2025-02-11 PROCEDURE — 99223 1ST HOSP IP/OBS HIGH 75: CPT

## 2025-02-11 PROCEDURE — 85240 CLOT FACTOR VIII AHG 1 STAGE: CPT | Mod: STJLAB | Performed by: EMERGENCY MEDICINE

## 2025-02-11 PROCEDURE — 83540 ASSAY OF IRON: CPT

## 2025-02-11 PROCEDURE — 86235 NUCLEAR ANTIGEN ANTIBODY: CPT | Mod: STJLAB | Performed by: STUDENT IN AN ORGANIZED HEALTH CARE EDUCATION/TRAINING PROGRAM

## 2025-02-11 PROCEDURE — 85301 ANTITHROMBIN III ANTIGEN: CPT | Performed by: EMERGENCY MEDICINE

## 2025-02-11 PROCEDURE — 96368 THER/DIAG CONCURRENT INF: CPT

## 2025-02-11 PROCEDURE — 70546 MR ANGIOGRAPH HEAD W/O&W/DYE: CPT

## 2025-02-11 PROCEDURE — G0425 INPT/ED TELECONSULT30: HCPCS | Performed by: PSYCHIATRY & NEUROLOGY

## 2025-02-11 PROCEDURE — 93005 ELECTROCARDIOGRAM TRACING: CPT

## 2025-02-11 PROCEDURE — 87040 BLOOD CULTURE FOR BACTERIA: CPT | Mod: STJLAB | Performed by: EMERGENCY MEDICINE

## 2025-02-11 PROCEDURE — 2500000002 HC RX 250 W HCPCS SELF ADMINISTERED DRUGS (ALT 637 FOR MEDICARE OP, ALT 636 FOR OP/ED)

## 2025-02-11 PROCEDURE — 70546 MR ANGIOGRAPH HEAD W/O&W/DYE: CPT | Performed by: RADIOLOGY

## 2025-02-11 PROCEDURE — 71046 X-RAY EXAM CHEST 2 VIEWS: CPT | Performed by: RADIOLOGY

## 2025-02-11 PROCEDURE — 36415 COLL VENOUS BLD VENIPUNCTURE: CPT | Performed by: EMERGENCY MEDICINE

## 2025-02-11 PROCEDURE — 71046 X-RAY EXAM CHEST 2 VIEWS: CPT

## 2025-02-11 PROCEDURE — 83010 ASSAY OF HAPTOGLOBIN QUANT: CPT | Mod: STJLAB | Performed by: EMERGENCY MEDICINE

## 2025-02-11 RX ORDER — HYDROMORPHONE HYDROCHLORIDE 0.2 MG/ML
0.2 INJECTION INTRAMUSCULAR; INTRAVENOUS; SUBCUTANEOUS ONCE
Status: DISCONTINUED | OUTPATIENT
Start: 2025-02-11 | End: 2025-02-11

## 2025-02-11 RX ORDER — FENTANYL CITRATE 50 UG/ML
25 INJECTION, SOLUTION INTRAMUSCULAR; INTRAVENOUS ONCE
Status: COMPLETED | OUTPATIENT
Start: 2025-02-11 | End: 2025-02-11

## 2025-02-11 RX ORDER — FOLIC ACID 1 MG/1
1 TABLET ORAL DAILY
Status: DISCONTINUED | OUTPATIENT
Start: 2025-02-11 | End: 2025-02-12 | Stop reason: HOSPADM

## 2025-02-11 RX ORDER — ACETAMINOPHEN 325 MG/1
975 TABLET ORAL ONCE
Status: COMPLETED | OUTPATIENT
Start: 2025-02-11 | End: 2025-02-11

## 2025-02-11 RX ORDER — VANCOMYCIN HYDROCHLORIDE 1 G/20ML
INJECTION, POWDER, LYOPHILIZED, FOR SOLUTION INTRAVENOUS DAILY PRN
Status: DISCONTINUED | OUTPATIENT
Start: 2025-02-11 | End: 2025-02-12 | Stop reason: HOSPADM

## 2025-02-11 RX ORDER — ACETAMINOPHEN 325 MG/1
650 TABLET ORAL EVERY 6 HOURS PRN
Status: DISCONTINUED | OUTPATIENT
Start: 2025-02-11 | End: 2025-02-12 | Stop reason: HOSPADM

## 2025-02-11 RX ORDER — METRONIDAZOLE 500 MG/100ML
500 INJECTION, SOLUTION INTRAVENOUS ONCE
Status: COMPLETED | OUTPATIENT
Start: 2025-02-11 | End: 2025-02-11

## 2025-02-11 RX ORDER — POTASSIUM CHLORIDE 20 MEQ/1
20 TABLET, EXTENDED RELEASE ORAL ONCE
Status: COMPLETED | OUTPATIENT
Start: 2025-02-11 | End: 2025-02-11

## 2025-02-11 RX ORDER — ONDANSETRON HYDROCHLORIDE 2 MG/ML
4 INJECTION, SOLUTION INTRAVENOUS EVERY 4 HOURS PRN
Status: DISCONTINUED | OUTPATIENT
Start: 2025-02-11 | End: 2025-02-12 | Stop reason: HOSPADM

## 2025-02-11 RX ORDER — GADOTERATE MEGLUMINE 376.9 MG/ML
13 INJECTION INTRAVENOUS
Status: COMPLETED | OUTPATIENT
Start: 2025-02-11 | End: 2025-02-11

## 2025-02-11 RX ORDER — HEPARIN SODIUM 10000 [USP'U]/100ML
0-4500 INJECTION, SOLUTION INTRAVENOUS CONTINUOUS
Status: DISCONTINUED | OUTPATIENT
Start: 2025-02-11 | End: 2025-02-12 | Stop reason: HOSPADM

## 2025-02-11 RX ADMIN — GADOTERATE MEGLUMINE 13 ML: 376.9 INJECTION INTRAVENOUS at 18:55

## 2025-02-11 RX ADMIN — HYDROMORPHONE HYDROCHLORIDE 0.5 MG: 1 INJECTION, SOLUTION INTRAMUSCULAR; INTRAVENOUS; SUBCUTANEOUS at 01:51

## 2025-02-11 RX ADMIN — HYDROMORPHONE HYDROCHLORIDE 0.5 MG: 1 INJECTION, SOLUTION INTRAMUSCULAR; INTRAVENOUS; SUBCUTANEOUS at 10:32

## 2025-02-11 RX ADMIN — IRON SUCROSE 300 MG: 20 INJECTION, SOLUTION INTRAVENOUS at 17:22

## 2025-02-11 RX ADMIN — VALPROATE SODIUM 500 MG: 100 INJECTION, SOLUTION INTRAVENOUS at 01:06

## 2025-02-11 RX ADMIN — FENTANYL CITRATE 25 MCG: 50 INJECTION, SOLUTION INTRAMUSCULAR; INTRAVENOUS at 00:26

## 2025-02-11 RX ADMIN — ONDANSETRON 4 MG: 2 INJECTION INTRAMUSCULAR; INTRAVENOUS at 19:08

## 2025-02-11 RX ADMIN — CEFTRIAXONE 2 G: 2 INJECTION, POWDER, FOR SOLUTION INTRAMUSCULAR; INTRAVENOUS at 00:32

## 2025-02-11 RX ADMIN — IOHEXOL 70 ML: 350 INJECTION, SOLUTION INTRAVENOUS at 00:11

## 2025-02-11 RX ADMIN — HYDROMORPHONE HYDROCHLORIDE 0.5 MG: 1 INJECTION, SOLUTION INTRAMUSCULAR; INTRAVENOUS; SUBCUTANEOUS at 17:21

## 2025-02-11 RX ADMIN — HYDROMORPHONE HYDROCHLORIDE 0.5 MG: 1 INJECTION, SOLUTION INTRAMUSCULAR; INTRAVENOUS; SUBCUTANEOUS at 21:30

## 2025-02-11 RX ADMIN — ONDANSETRON 4 MG: 2 INJECTION INTRAMUSCULAR; INTRAVENOUS at 13:12

## 2025-02-11 RX ADMIN — METRONIDAZOLE 500 MG: 5 INJECTION, SOLUTION INTRAVENOUS at 01:55

## 2025-02-11 RX ADMIN — VANCOMYCIN HYDROCHLORIDE 1.5 G: 1.5 INJECTION, POWDER, LYOPHILIZED, FOR SOLUTION INTRAVENOUS at 02:56

## 2025-02-11 RX ADMIN — ACYCLOVIR SODIUM 550 MG: 50 INJECTION, SOLUTION INTRAVENOUS at 06:38

## 2025-02-11 RX ADMIN — HEPARIN SODIUM 1000 UNITS/HR: 10000 INJECTION, SOLUTION INTRAVENOUS at 17:57

## 2025-02-11 RX ADMIN — ACETAMINOPHEN 975 MG: 325 TABLET ORAL at 19:45

## 2025-02-11 RX ADMIN — VANCOMYCIN HYDROCHLORIDE 1500 MG: 1.5 INJECTION, POWDER, LYOPHILIZED, FOR SOLUTION INTRAVENOUS at 14:48

## 2025-02-11 RX ADMIN — HEPARIN SODIUM 1100 UNITS/HR: 10000 INJECTION, SOLUTION INTRAVENOUS at 02:25

## 2025-02-11 RX ADMIN — FOLIC ACID 1 MG: 1 TABLET ORAL at 17:21

## 2025-02-11 RX ADMIN — HYALURONIDASE 1 ML: 150 INJECTION SUBCUTANEOUS at 20:15

## 2025-02-11 RX ADMIN — CEFTRIAXONE 2 G: 2 INJECTION, POWDER, FOR SOLUTION INTRAMUSCULAR; INTRAVENOUS at 14:12

## 2025-02-11 RX ADMIN — POTASSIUM CHLORIDE 20 MEQ: 1500 TABLET, EXTENDED RELEASE ORAL at 06:35

## 2025-02-11 RX ADMIN — HYDROMORPHONE HYDROCHLORIDE 0.5 MG: 1 INJECTION, SOLUTION INTRAMUSCULAR; INTRAVENOUS; SUBCUTANEOUS at 03:21

## 2025-02-11 RX ADMIN — HYDROMORPHONE HYDROCHLORIDE 0.5 MG: 1 INJECTION, SOLUTION INTRAMUSCULAR; INTRAVENOUS; SUBCUTANEOUS at 13:12

## 2025-02-11 SDOH — ECONOMIC STABILITY: INCOME INSECURITY: IN THE PAST 12 MONTHS HAS THE ELECTRIC, GAS, OIL, OR WATER COMPANY THREATENED TO SHUT OFF SERVICES IN YOUR HOME?: NO

## 2025-02-11 SDOH — ECONOMIC STABILITY: FOOD INSECURITY: WITHIN THE PAST 12 MONTHS, THE FOOD YOU BOUGHT JUST DIDN'T LAST AND YOU DIDN'T HAVE MONEY TO GET MORE.: NEVER TRUE

## 2025-02-11 SDOH — SOCIAL STABILITY: SOCIAL INSECURITY: DO YOU FEEL UNSAFE GOING BACK TO THE PLACE WHERE YOU ARE LIVING?: NO

## 2025-02-11 SDOH — SOCIAL STABILITY: SOCIAL INSECURITY
WITHIN THE LAST YEAR, HAVE YOU BEEN KICKED, HIT, SLAPPED, OR OTHERWISE PHYSICALLY HURT BY YOUR PARTNER OR EX-PARTNER?: NO

## 2025-02-11 SDOH — SOCIAL STABILITY: SOCIAL INSECURITY: WITHIN THE LAST YEAR, HAVE YOU BEEN AFRAID OF YOUR PARTNER OR EX-PARTNER?: NO

## 2025-02-11 SDOH — SOCIAL STABILITY: SOCIAL INSECURITY
WITHIN THE LAST YEAR, HAVE YOU BEEN RAPED OR FORCED TO HAVE ANY KIND OF SEXUAL ACTIVITY BY YOUR PARTNER OR EX-PARTNER?: NO

## 2025-02-11 SDOH — ECONOMIC STABILITY: FOOD INSECURITY: WITHIN THE PAST 12 MONTHS, YOU WORRIED THAT YOUR FOOD WOULD RUN OUT BEFORE YOU GOT THE MONEY TO BUY MORE.: NEVER TRUE

## 2025-02-11 SDOH — SOCIAL STABILITY: SOCIAL INSECURITY: WITHIN THE LAST YEAR, HAVE YOU BEEN HUMILIATED OR EMOTIONALLY ABUSED IN OTHER WAYS BY YOUR PARTNER OR EX-PARTNER?: NO

## 2025-02-11 SDOH — SOCIAL STABILITY: SOCIAL INSECURITY: HAVE YOU HAD THOUGHTS OF HARMING ANYONE ELSE?: NO

## 2025-02-11 SDOH — SOCIAL STABILITY: SOCIAL INSECURITY: HAS ANYONE EVER THREATENED TO HURT YOUR FAMILY OR YOUR PETS?: NO

## 2025-02-11 SDOH — SOCIAL STABILITY: SOCIAL INSECURITY: DO YOU FEEL ANYONE HAS EXPLOITED OR TAKEN ADVANTAGE OF YOU FINANCIALLY OR OF YOUR PERSONAL PROPERTY?: NO

## 2025-02-11 SDOH — SOCIAL STABILITY: SOCIAL INSECURITY: WERE YOU ABLE TO COMPLETE ALL THE BEHAVIORAL HEALTH SCREENINGS?: YES

## 2025-02-11 SDOH — SOCIAL STABILITY: SOCIAL INSECURITY: DOES ANYONE TRY TO KEEP YOU FROM HAVING/CONTACTING OTHER FRIENDS OR DOING THINGS OUTSIDE YOUR HOME?: NO

## 2025-02-11 SDOH — SOCIAL STABILITY: SOCIAL INSECURITY: ARE YOU OR HAVE YOU BEEN THREATENED OR ABUSED PHYSICALLY, EMOTIONALLY, OR SEXUALLY BY ANYONE?: NO

## 2025-02-11 SDOH — SOCIAL STABILITY: SOCIAL INSECURITY: HAVE YOU HAD ANY THOUGHTS OF HARMING ANYONE ELSE?: NO

## 2025-02-11 SDOH — SOCIAL STABILITY: SOCIAL INSECURITY: ARE THERE ANY APPARENT SIGNS OF INJURIES/BEHAVIORS THAT COULD BE RELATED TO ABUSE/NEGLECT?: NO

## 2025-02-11 SDOH — SOCIAL STABILITY: SOCIAL INSECURITY: ABUSE: ADULT

## 2025-02-11 ASSESSMENT — PATIENT HEALTH QUESTIONNAIRE - PHQ9
SUM OF ALL RESPONSES TO PHQ9 QUESTIONS 1 & 2: 2
2. FEELING DOWN, DEPRESSED OR HOPELESS: SEVERAL DAYS
1. LITTLE INTEREST OR PLEASURE IN DOING THINGS: SEVERAL DAYS

## 2025-02-11 ASSESSMENT — COGNITIVE AND FUNCTIONAL STATUS - GENERAL
PATIENT BASELINE BEDBOUND: NO
MOBILITY SCORE: 24
DAILY ACTIVITIY SCORE: 24
MOBILITY SCORE: 24
DAILY ACTIVITIY SCORE: 24

## 2025-02-11 ASSESSMENT — ACTIVITIES OF DAILY LIVING (ADL)
TOILETING: INDEPENDENT
HEARING - LEFT EAR: FUNCTIONAL
WALKS IN HOME: INDEPENDENT
FEEDING YOURSELF: INDEPENDENT
ADEQUATE_TO_COMPLETE_ADL: YES
BATHING: INDEPENDENT
LACK_OF_TRANSPORTATION: NO
DRESSING YOURSELF: INDEPENDENT
HEARING - RIGHT EAR: FUNCTIONAL
PATIENT'S MEMORY ADEQUATE TO SAFELY COMPLETE DAILY ACTIVITIES?: YES
GROOMING: INDEPENDENT
LACK_OF_TRANSPORTATION: NO
JUDGMENT_ADEQUATE_SAFELY_COMPLETE_DAILY_ACTIVITIES: YES

## 2025-02-11 ASSESSMENT — PAIN SCALES - GENERAL
PAINLEVEL_OUTOF10: 8
PAINLEVEL_OUTOF10: 6
PAINLEVEL_OUTOF10: 7
PAINLEVEL_OUTOF10: 0 - NO PAIN
PAINLEVEL_OUTOF10: 5 - MODERATE PAIN
PAINLEVEL_OUTOF10: 9
PAINLEVEL_OUTOF10: 9
PAINLEVEL_OUTOF10: 10 - WORST POSSIBLE PAIN
PAINLEVEL_OUTOF10: 7
PAINLEVEL_OUTOF10: 8
PAINLEVEL_OUTOF10: 10 - WORST POSSIBLE PAIN
PAINLEVEL_OUTOF10: 5 - MODERATE PAIN
PAINLEVEL_OUTOF10: 7
PAINLEVEL_OUTOF10: 0 - NO PAIN
PAINLEVEL_OUTOF10: 8

## 2025-02-11 ASSESSMENT — PAIN - FUNCTIONAL ASSESSMENT
PAIN_FUNCTIONAL_ASSESSMENT: 0-10

## 2025-02-11 ASSESSMENT — LIFESTYLE VARIABLES
HOW OFTEN DO YOU HAVE A DRINK CONTAINING ALCOHOL: 2-3 TIMES A WEEK
HOW OFTEN DO YOU HAVE 6 OR MORE DRINKS ON ONE OCCASION: LESS THAN MONTHLY
AUDIT-C TOTAL SCORE: 5
HOW MANY STANDARD DRINKS CONTAINING ALCOHOL DO YOU HAVE ON A TYPICAL DAY: 3 OR 4
AUDIT-C TOTAL SCORE: 5
SKIP TO QUESTIONS 9-10: 0

## 2025-02-11 ASSESSMENT — VISUAL ACUITY
OS: 20/50
OU: 20/50
OD: 20/50

## 2025-02-11 ASSESSMENT — PAIN DESCRIPTION - LOCATION
LOCATION: HEAD

## 2025-02-11 NOTE — ASSESSMENT & PLAN NOTE
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 Patient states that the the headache has been going on for the past 10 days. She has had flu-like symptoms for the past 3 weeks.  She localizes the headache more behind the right eye and its worsening through time.  - CT head venogram w and wo IV contrast suggestive dural venous sinus thrombosis.  - Differential is broad and could be related to underlying infection, dehydration hypercoagulable state including malignancy. Specially with previous history of stroke hypercoagulable state needs to be ruled out.  - NIH stroke scale of 0.  - Neurology consulted and recommended to start her on heparin infusion and transfer her to [facility] for deterioration.  - Neurology recommended to avoid hypotension SBP < 100  Plan  - Admit to the floor  - Will start her on high dose ceftriaxone, vancomycin and acyclovir.   - Continue heparin infusion per the protocol  - Neuro check Q2hr  - Avoid hypotension SBP< 100  - Monitor vital signs  - Daily labs with RFT and electrolytes  - protein S/C pending  -Antithrombin 2 antigen pending  -Homocystine pending  -Factor VIII activity pending  -Lupus anticoagulant pending  -Haptoglobin pending  - Neurology consultation  - PT/OT    # Hypokalemia  -Potassium on admission is 3.4, repleted with p.o. Kcl  -Monitor potassium    # Leukocytosis  # Thrombocytosis  # Microcytic anemia  -H/H on admission is 7.1/26.7 with MCV of 63, Hgb on 6/10/2024 was 9.8  -WBC on admission 21.4, platelet count admission is 595  -No signs of active bleeding/no hematuria or hematemesis  -UA is negative for UTI  -Monitor for fever and daily CBC

## 2025-02-11 NOTE — ASSESSMENT & PLAN NOTE
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 Patient states that the the headache has been going on for the past 10 days. She has had flu-like symptoms for the past 3 weeks.  She localizes the headache more behind the right eye and its worsening through time.  - CT head venogram w and wo IV contrast suggestive dural venous sinus thrombosis.  - Differential is broad and could be related to underlying infection, dehydration hypercoagulable state including malignancy. Specially with previous history of stroke hypercoagulable state needs to be ruled out.  - NIH stroke scale of 0.  - Neurology consulted and recommended to start her on heparin infusion and transfer her to [facility] for deterioration.  - Neurology recommended to avoid hypotension SBP < 100  Plan  - Admit to the floor  - Will start her on high dose ceftriaxone, vancomycin and acyclovir.   - Continue heparin infusion per the protocol  - Neuro check Q2hr  - Avoid hypotension SBP< 100  - Monitor vital signs  - Daily labs with RFT and electrolytes  - protein S/C pending  -Antithrombin 2 antigen pending  -Homocystine pending  -Factor VIII activity pending  -Lupus anticoagulant pending  -Haptoglobin pending  - Neurology consultation  - PT/OT    # Hypokalemia  -Potassium on admission is 3.4, repleted with p.o. Kcl  -Monitor potassium    # Leukocytosis  # Thrombocytosis  # Microcytic anemia  -H/H on admission is 7.1/26.7 with MCV of 63, Hgb on 6/10/2024 was 9.8  -WBC on admission 21.4, platelet count admission is 595  -No signs of active bleeding/no hematuria or hematemesis  -UA is negative for UTI  -Monitor for fever and daily CBC

## 2025-02-11 NOTE — CONSULTS
Reason For Consult  Dural sinus thrombosis    History Of Present Illness  Shreya Garces is a 34 y.o. female presenting with reported approximately 3 weeks of  flulike symptoms.  Increasing frontal head pressure letter to call EMS and on arrival she endorses nausea, vomiting, increased urinary frequency and diarrhea.  Reports approximately 1 week sinus congestion mucus production.  Reports show that she demanded MRI.  CT of the head with hyperdense thrombus within posterior superior sagittal sinus.  Video stroke team consult with neurology occurred earlier this a.m. with recommendations no thrombectomy, IV heparin, and continue workup of underlying cause.  Neurosurgery placed on consult.  On visits morning patient is awake, alert, and cooperative.  She prefers to keep her eyes closed is much as possible secondary to internal head pressure/pain.  She does endorse right eye intermittent blurry vision. Patient denies  shortness of breath, chest pain, abdominal pain, numbness or tingling in arms or legs, bowel or bladder changes.        Past Medical History  She has a past medical history of Other conditions influencing health status, Personal history of other diseases of the circulatory system, and Personal history of other diseases of the nervous system and sense organs.    Surgical History  She has a past surgical history that includes  section, classic (2017) and MR angio head wo IV contrast (2019).     Social History  She reports that she has never smoked. She has never used smokeless tobacco. She reports current alcohol use.  Drug: Marijuana.    Family History  No family history on file.     Allergies  Atorvastatin and Penicillins    Review of Systems   systems reviewed and negative other than what is listed in the history of present illness       Physical Exam  General: Well developed, awake/alert/oriented x3, no distress, alert and cooperative  Skin: Warm and dry, no lesions, no  "rashes  ENMT: Mucous membranes moist, no apparent injury, no lesions seen  Head/Neck: Neck Supple, no apparent injury  Respiratory/Thorax: Normal breath sounds with good chest expansion, thorax symmetric  Cardiovascular: No pitting edema, no JVD  CNII-XI largely intact, no drift, no dysmetria    Motor Strength: 5/5 Throughout upper and lower extremities    Muscle Bulk: Normal and symmetric in all extremities     Paraspinal muscle spasm/tenderness absent.   Midline tenderness absent    Sensation: intact to light touch        Last Recorded Vitals  Blood pressure (!) 148/93, pulse 50, temperature 36.1 °C (97 °F), temperature source Temporal, resp. rate 12, height 1.626 m (5' 4\"), weight 64 kg (141 lb 1.5 oz), SpO2 97%.    Relevant Results  CT head wo IV contrast    Result Date: 2/11/2025  Interpreted By:  Savannah Tipton, STUDY: CT HEAD WO IV CONTRAST; CT VENOGRAM HEAD W AND WO IV CONTRAST AND POST PROCEDURE;  2/10/2025 11:28 pm; 2/11/2025 12:22 am   INDICATION: Signs/Symptoms:headache, subjective visual change; Signs/Symptoms:Concern for right transverse.   COMPARISON: None.   ACCESSION NUMBER(S): [id number]; [id number]   ORDERING CLINICIAN: BETTE JARA   TECHNIQUE: Axial noncontrast CT images of the head. Contrast-enhanced CT venogram.   FINDINGS: BRAIN PARENCHYMA: On the unenhanced imaging there is hyperdense attenuation within the posterosuperior sagittal sinus as well as the straight sinus, confluence and predominantly the right transverse sinus extending into the right sigmoid sinus. Findings are consistent with acute dural sinus thrombosis.   Gray-white matter interfaces are preserved. No mass effect or midline shift.   HEMORRHAGE: No acute intracranial hemorrhage. VENTRICLES and EXTRA-AXIAL SPACES: Normal size. EXTRACRANIAL SOFT TISSUES: Within normal limits. PARANASAL SINUSES/MASTOIDS: Mucosal thickening of the right maxillary sinus. Fluid throughout the left maxillary sinus, left ethmoid air cells on the " left frontal sinus. CALVARIUM: No depressed skull fracture. No destructive osseous lesion.   Filling defect following contrast administration noted in the posterior superior sagittal sinus extending into the confluence. There is no significant contrast opacification within the straight sinus, the right transverse sinus or the right sigmoid sinus. The left transverse sinus is slightly diminutive which could be developmental versus related to partial filling defects. The internal cerebral veins and vein of Conor appears patent.   OTHER FINDINGS: None.       Hyperdense thrombus on unenhanced imaging with corresponding filling defects on CT venogram noted within the posterior superior sagittal sinus, the straight sinus, the right transverse sinus and the right sigmoid sinus consistent with extensive posterior dural venous thrombosis. Diminutive appearance of the left transverse sinus which may be developmental.   No evidence of hemorrhagic infarct.   MACRO: Savannah Tipton discussed the significance and urgency of this critical finding by telephone with Babak Elliott on 2/11/2025 at 1:31 am.  (**-RCF-**) Findings:  See findings.   Signed by: Savannah Tipton 2/11/2025 1:37 AM Dictation workstation:   [id number]    CT venogram head w and wo iv contrast    Result Date: 2/11/2025  Interpreted By:  Savannah Tipton, STUDY: CT HEAD WO IV CONTRAST; CT VENOGRAM HEAD W AND WO IV CONTRAST AND POST PROCEDURE;  2/10/2025 11:28 pm; 2/11/2025 12:22 am   INDICATION: Signs/Symptoms:headache, subjective visual change; Signs/Symptoms:Concern for right transverse.   COMPARISON: None.   ACCESSION NUMBER(S): [id number]; [id number]   ORDERING CLINICIAN: BETTE JARA   TECHNIQUE: Axial noncontrast CT images of the head. Contrast-enhanced CT venogram.   FINDINGS: BRAIN PARENCHYMA: On the unenhanced imaging there is hyperdense attenuation within the posterosuperior sagittal sinus as well as the straight sinus, confluence and predominantly the  right transverse sinus extending into the right sigmoid sinus. Findings are consistent with acute dural sinus thrombosis.   Gray-white matter interfaces are preserved. No mass effect or midline shift.   HEMORRHAGE: No acute intracranial hemorrhage. VENTRICLES and EXTRA-AXIAL SPACES: Normal size. EXTRACRANIAL SOFT TISSUES: Within normal limits. PARANASAL SINUSES/MASTOIDS: Mucosal thickening of the right maxillary sinus. Fluid throughout the left maxillary sinus, left ethmoid air cells on the left frontal sinus. CALVARIUM: No depressed skull fracture. No destructive osseous lesion.   Filling defect following contrast administration noted in the posterior superior sagittal sinus extending into the confluence. There is no significant contrast opacification within the straight sinus, the right transverse sinus or the right sigmoid sinus. The left transverse sinus is slightly diminutive which could be developmental versus related to partial filling defects. The internal cerebral veins and vein of Conor appears patent.   OTHER FINDINGS: None.       Hyperdense thrombus on unenhanced imaging with corresponding filling defects on CT venogram noted within the posterior superior sagittal sinus, the straight sinus, the right transverse sinus and the right sigmoid sinus consistent with extensive posterior dural venous thrombosis. Diminutive appearance of the left transverse sinus which may be developmental.   No evidence of hemorrhagic infarct.   MACRO: Savannah Tipton discussed the significance and urgency of this critical finding by telephone with Babak Elliott on 2/11/2025 at 1:31 am.  (**-RCF-**) Findings:  See findings.   Signed by: Savannah Tipton 2/11/2025 1:37 AM Dictation workstation:   [id number]        Assessment/Plan     Dural sinus thrombosis  No acute neurosurgical intervention planned  Continue neurology recommendations, heparin, identification of underlying cause  Defer to medical management         David CARLIE Messer  KEANU

## 2025-02-11 NOTE — CONSULTS
Vancomycin Dosing by Pharmacy- INITIAL    Shreya Garecs is a 34 y.o. year old female who Pharmacy has been consulted for vancomycin dosing for CNS/meningoencephalitis. Based on the patient's indication and renal status this patient will be dosed based on a goal AUC of 500-600.     Renal function is currently stable.    Visit Vitals  /77   Pulse (!) 44   Temp 36.1 °C (97 °F) (Temporal)   Resp 12        Lab Results   Component Value Date    CREATININE 0.47 (L) 02/10/2025    CREATININE 0.59 06/10/2024        Patient weight is as follows:   Vitals:    25 0508   Weight: 64 kg (141 lb 1.5 oz)       Cultures:  No results found for the encounter in last 14 days.        No intake/output data recorded.  I/O during current shift:  I/O this shift:  In: 50.4 [I.V.:50.4]  Out: 0     Temp (24hrs), Av.2 °C (97.1 °F), Min:36.1 °C (97 °F), Max:36.2 °C (97.2 °F)         Assessment/Plan     Patient has already been given a one time dose of 1500 mg.  Will initiate vancomycin maintenance,  1500 mg every 12 hours.    This dosing regimen is predicted by [employer] to result in the following pharmacokinetic parameters:  Loading dose: N/A  Regimen: 1500 mg IV every 12 hours.  Start time: 14:56 on 2025  Exposure target: AUC24 (range)400-600 mg/L.hr   [id number]: 524 mg/L.hr  AUC24,ss: 555 mg/L.hr  Probability of AUC24 > 400: 80 %  Ctrough,ss: 14.9 mg/L  Probability of Ctrough,ss > 20: 30 %    Follow-up level will be ordered on  at 1000 unless clinically indicated sooner.  Will continue to monitor renal function daily while on vancomycin and order serum creatinine at least every 48 hours if not already ordered.  Follow for continued vancomycin needs, clinical response, and signs/symptoms of toxicity.       TATY MEDRANO, PharmD

## 2025-02-11 NOTE — CONSULTS
"Inpatient consult to Neurology  Consult performed by: Annmarie Castellon MD  Consult ordered by: Sukhwinder Walsh MD      Page time: 1:52  Call return: 1:52  Provider in video: 2:04  Patient in video: 2:05    History Of Present Illness:  Historian: Patient, Family, and ED Provider   Shreya Garces is a 34 y.o. female presenting with severe headache, fever, nausea, vomiting.  Noted to anemic on exam, no excessive fatigue prior to this illness, denies heavy periods/not on her period.  Last known well: one week ago  Had stroke symptoms resolved at time of presentation: No     Prior Functional Status (Modified Gustavus Scale):  0 The patient has no residual symptoms.    Stroke Risk Factors:  none    Last Recorded Vitals:  Blood pressure (!) 144/91, pulse (!) 42, temperature 36.1 °C (97 °F), temperature source Temporal, resp. rate 16, height 1.626 m (5' 4\"), weight 62.1 kg (137 lb), SpO2 100%.    Exam: Eyes initially closed (headache) but opens eyes to commands and follows commands fairly briskly.  Patient is alert, attentive with normal language, orientation and speech.  Extraocular eye movements are intact without nystagmus.  Visual fields full to Nurse confrontation exam. Sensation is intact to patient light touch V1-3.  Face is symmetric.  Motor: There is slight right drift on the arm; no drift on the leg but nurse saw right leg drift earlier today, no orbiting and intact fine finger movements. Sensation is intact to light touch on the arms and legs bilaterally.  Finger nose finger shows no ataxia.      NIHSS:  1A. Level of Consciousness: 0  1B. Ask Month and Age: 0  1C. Blink Eyes & Squeeze Hands: 0  2. Best Gaze: 0  3. Visual: 0  4. Facial Palsy: 0  5A. Motor - Left Arm: 0  5B. Motor - Right Arm: 1; subtle  6A. Motor - Left Le  6B. Motor - Right Le  7. Limb Ataxia: 0  8. Sensory Loss: 0  9. Best Language: 0  10. Dysarthria: 0  11. Extinction and Inattention: 0  NIH Stroke Scale: 1       Relevant " Results:  LABS:  Glucose   Date Value Ref Range Status   02/10/2025 183 (H) 74 - 99 mg/dL Final      Results for orders placed or performed during the hospital encounter of 02/10/25 (from the past 24 hours)   CBC and Auto Differential   Result Value Ref Range    WBC 21.4 (H) 4.4 - 11.3 x10*3/uL    nRBC 0.0 0.0 - 0.0 /100 WBCs    RBC 4.25 4.00 - 5.20 x10*6/uL    Hemoglobin 7.1 (L) 12.0 - 16.0 g/dL    Hematocrit 26.7 (L) 36.0 - 46.0 %    MCV 63 (L) 80 - 100 fL    MCH 16.7 (L) 26.0 - 34.0 pg    MCHC 26.6 (L) 32.0 - 36.0 g/dL    RDW 21.2 (H) 11.5 - 14.5 %    Platelets 595 (H) 150 - 450 x10*3/uL    Neutrophils % 90.4 40.0 - 80.0 %    Immature Granulocytes %, Automated 0.8 0.0 - 0.9 %    Lymphocytes % 5.5 13.0 - 44.0 %    Monocytes % 3.0 2.0 - 10.0 %    Eosinophils % 0.1 0.0 - 6.0 %    Basophils % 0.2 0.0 - 2.0 %    Neutrophils Absolute 19.29 (H) 1.20 - 7.70 x10*3/uL    Immature Granulocytes Absolute, Automated 0.17 0.00 - 0.70 x10*3/uL    Lymphocytes Absolute 1.18 (L) 1.20 - 4.80 x10*3/uL    Monocytes Absolute 0.65 0.10 - 1.00 x10*3/uL    Eosinophils Absolute 0.03 0.00 - 0.70 x10*3/uL    Basophils Absolute 0.04 0.00 - 0.10 x10*3/uL   Comprehensive metabolic panel   Result Value Ref Range    Glucose 183 (H) 74 - 99 mg/dL    Sodium 140 136 - 145 mmol/L    Potassium 3.4 (L) 3.5 - 5.3 mmol/L    Chloride 103 98 - 107 mmol/L    Bicarbonate 26 21 - 32 mmol/L    Anion Gap 14 10 - 20 mmol/L    Urea Nitrogen 11 6 - 23 mg/dL    Creatinine 0.47 (L) 0.50 - 1.05 mg/dL    eGFR >90 >60 mL/min/1.73m*2    Calcium 8.5 (L) 8.6 - 10.3 mg/dL    Albumin 3.5 3.4 - 5.0 g/dL    Alkaline Phosphatase 110 33 - 110 U/L    Total Protein 6.9 6.4 - 8.2 g/dL    AST 72 (H) 9 - 39 U/L    Bilirubin, Total 0.6 0.0 - 1.2 mg/dL    ALT 29 7 - 45 U/L   hCG, quantitative, pregnancy   Result Value Ref Range    HCG, Beta-Quantitative <2 <5 mIU/mL   Morphology   Result Value Ref Range    RBC Morphology See Below     Stomatocytes Few    Reticulocytes   Result Value  Ref Range    Retic % 1.8 0.5 - 2.0 %    Retic Absolute 0.076 0.018 - 0.083 x10*6/uL    Reticulocyte Hemoglobin 19 (L) 28 - 38 pg    Immature Retic fraction 27.4 (H) <=16.0 %   LDH, Lactate dehydrogenase   Result Value Ref Range     84 - 246 U/L   Lactate   Result Value Ref Range    Lactate 2.0 0.4 - 2.0 mmol/L   Type and Screen   Result Value Ref Range    ABO TYPE O     Rh TYPE POS     ANTIBODY SCREEN NEG    Prepare RBC: 1 Units   Result Value Ref Range    PRODUCT CODE Q0444T89     Unit Number [id number]     Unit ABO O     Unit RH POS     XM INTEP COMP     Dispense Status XM     Blood Expiration Date 3/4/2025 11:59:00 PM EST     PRODUCT BLOOD TYPE 5100     UNIT VOLUME 350    Verify ABO/Rh Group Test (VERAB)   Result Value Ref Range    ABO TYPE O     Rh TYPE POS         CT Head Imaging:  CTH imaging personally reviewed, showed no acute ischemic / hemorrhagic changes     CTA Head and Neck Imaging:  CTV shows thrombus in the  posterior superior sagittal sinus, straight sinus, right transverse and right sigmoid sinus    CT Perfusion Imaging:  none    Diagnosis:  Cerebral venous thrombosis   Differential is broad and can be multifactorial.  Could be related to underlying infection, dehydration, Hypercoaguable state including malignancy.  Even in the setting of hemorrhagic conversion the treatment is anticoagulation with initial treatment being heparin.    IV Thrombolysis IV Thrombolysis Checklist      IV Thrombolysis Given: No; Thrombolysis contraindication reason: Other : Cerebral Venous thrombosis, not indicated.          Patient is a candidate for thrombectomy:  yes/no: No; contraindication reason: NIHSS <6; for Cerebral venous thrombosis first line therapy is heparin.  Endovascular options only if the patient deteriorates while anticoagulated.  Very few patients require endovascular treatment for Cerebral venous thrombosis.        Additional Recommendations:  Hypercoaguable labs including protein S,  protein C, antiphospholipid antibodies, DRVVT, anti-thrombin 3, homocysteine, factor VIII, factor V liden, prothrombin gene mutation and Jak2  Heparin drip after hypercoaguable labs drawn.  Symptomatic treatment of headache  Investigate/treat any underlying infection  Hydration  Blood pressure goals: avoid hypotension SBP <100 that could worsen cerebral perfusion,  normotension  Smoking Cessation and Education  Patient and family education on signs and symptoms of stroke, calling 911, healthy strategies for stroke prevention.      Disposition:  Patient will remain at referring facility for further evaluation and management.    Virtual or Telephone Consent    An interactive audio and video telecommunication system which permits real time communications between the patient (at the originating site) and provider (at the distant site) was utilized to provide this telehealth service.   Verbal consent was requested and obtained from Shreya Garces on this date, 02/11/25 for a telehealth visit.    Virtual Visit start time: 2:05 pm    Annmarie Castellon MD

## 2025-02-11 NOTE — CONSULTS
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 female presenting with above outlined medical problems presents with hypertension previous lacunar stroke prior gastric bypass for obesity smoking history and acute dural venous sinus thrombosis in the setting of acute thrombosis sinus bradycardia hemodynamically stable 46 to 54 bpm.  No previous congenital or acquired cardiac disease.  Reported 3 weeks of flulike symptoms frontal headache called EMS presented with nausea vomiting diarrhea and urinary frequency, 1 week of sinus congestion mucus production.  She reportedly demanded an MRI a CT was done showing hyperdense thrombosis in the posterior superior sagittal sinus.  Video telestroke consult this morning no recommendations for thrombectomy IV anticoagulation with heparin was started.  She has a history of remote lacunar stroke 2018 hypertension hyperlipidemia migrainous headache according to neurology the differential diagnosis is broad with infection dehydration hypercoagulopathy including malignancy.  Per neurology plans to move her to [facility] should she deteriorate was started on vancomycin and acyclovir ceftriaxone.  Antithrombin 2 antigen homocystine factor VIII lupus anticoagulant haptoglobin all pending.  She presented with anemia 7.1 26.7 MCV 63 in June 2024 hemoglobin 9.8 when she first arrived in the ICU her heart rate was 42 which has improved currently at 52 she weighs 62 kg 100% saturation 5 foot 4.  I see her first EKG showed sinus arrhythmia bradycardia with a heart rate of 35 a 2024 EKG was normal she does report use of alcohol and marijuana excessive vagal  tonic influences.  Present and echo to rule out PFO shunt or congenital heart disease he is planned and take a look at her heart valves to rule out infection        Past Medical History  She has a past medical history of Other conditions influencing health status, Personal history of other diseases of the circulatory system, and Personal history of other diseases of the nervous system and sense organs.     Surgical History  She has a past surgical history that includes  section, classic (2017) and MR angio head wo IV contrast (2019).     Social History  She reports that she has never smoked. She has never used smokeless tobacco. She reports current alcohol use.  Drug: Marijuana.     Family History  Family History[]Expand by Default   No family history on file.        Allergies  Atorvastatin and Penicillins     Last Recorded Vitals:  Vitals:    25 0804 25 1136 25 1151 25 1200   BP: (!) 148/93 160/87 139/82    BP Location: Right arm Right arm     Patient Position: Lying Lying     Pulse: 50 (!) 46 (!) 46 (!) 44   Resp: 12 10 17 13   Temp: 36.1 °C (97 °F) 36.2 °C (97.2 °F) 35.6 °C (96.1 °F)    TempSrc: Temporal Temporal Temporal    SpO2: 97% 98% 96% 96%   Weight:       Height:           Last Labs:  CBC - 2025:  9:02 AM  17.6 6.8 549    25.1      CMP - 2025:  6:30 AM  8.7 6.9 72 --- 0.6   3.0 3.4 29 110      PTT - 2025:  1:52 AM  1.1   12.7 29     Hemoglobin A1C   Date/Time Value Ref Range Status   2023 10:49 AM 5.4 4.0 - 5.6 % Final   2021 09:35 AM 5.5 4.0 - 5.6 % Final     VLDL   Date/Time Value Ref Range Status   2019 09:57 AM 32 0 - 40 mg/dL Final      Last I/O:  I/O last 3 completed shifts:  In: 50.4 (0.8 mL/kg) [I.V.:50.4 (0.8 mL/kg)]  Out: 0 (0 mL/kg)   Weight: 64 kg     Past Cardiology Tests (Last 3 Years):  EKG:  ECG 12 Lead 06/10/2024    Echo:  No results found for this or any previous visit from the past 1095 days.    Ejection  "Fractions:  No results found for: \"EF\"  Cath:  No results found for this or any previous visit from the past 1095 days.    Stress Test:  No results found for this or any previous visit from the past 1095 days.    Cardiac Imaging:  No results found for this or any previous visit from the past 1095 days.      Past Medical History:  She has a past medical history of Other conditions influencing health status, Personal history of other diseases of the circulatory system, and Personal history of other diseases of the nervous system and sense organs.    Past Surgical History:  She has a past surgical history that includes  section, classic (2017) and MR angio head wo IV contrast (2019).      Social History:  She reports that she has never smoked. She has never used smokeless tobacco. She reports current alcohol use.  Drug: Marijuana.    Family History:  No family history on file.     Allergies:  Atorvastatin and Penicillins    Inpatient Medications:  Scheduled medications   Medication Dose Route Frequency    acyclovir  10 mg/kg (Ideal) intravenous q8h    cefTRIAXone  2 g intravenous q12h    iohexol  75 mL intravenous Once in imaging    vancomycin  1,500 mg intravenous q12h     PRN medications   Medication    heparin    vancomycin     Continuous Medications   Medication Dose Last Rate    heparin  0-4,500 Units/hr 1,000 Units/hr (25 0729)     Outpatient Medications:  No current outpatient medications    Results for orders placed or performed during the hospital encounter of 02/10/25 (from the past 96 hours)   CBC and Auto Differential   Result Value Ref Range    WBC 21.4 (H) 4.4 - 11.3 x10*3/uL    nRBC 0.0 0.0 - 0.0 /100 WBCs    RBC 4.25 4.00 - 5.20 x10*6/uL    Hemoglobin 7.1 (L) 12.0 - 16.0 g/dL    Hematocrit 26.7 (L) 36.0 - 46.0 %    MCV 63 (L) 80 - 100 fL    MCH 16.7 (L) 26.0 - 34.0 pg    MCHC 26.6 (L) 32.0 - 36.0 g/dL    RDW 21.2 (H) 11.5 - 14.5 %    Platelets 595 (H) 150 - 450 x10*3/uL    " Neutrophils % 90.4 40.0 - 80.0 %    Immature Granulocytes %, Automated 0.8 0.0 - 0.9 %    Lymphocytes % 5.5 13.0 - 44.0 %    Monocytes % 3.0 2.0 - 10.0 %    Eosinophils % 0.1 0.0 - 6.0 %    Basophils % 0.2 0.0 - 2.0 %    Neutrophils Absolute 19.29 (H) 1.20 - 7.70 x10*3/uL    Immature Granulocytes Absolute, Automated 0.17 0.00 - 0.70 x10*3/uL    Lymphocytes Absolute 1.18 (L) 1.20 - 4.80 x10*3/uL    Monocytes Absolute 0.65 0.10 - 1.00 x10*3/uL    Eosinophils Absolute 0.03 0.00 - 0.70 x10*3/uL    Basophils Absolute 0.04 0.00 - 0.10 x10*3/uL   Comprehensive metabolic panel   Result Value Ref Range    Glucose 183 (H) 74 - 99 mg/dL    Sodium 140 136 - 145 mmol/L    Potassium 3.4 (L) 3.5 - 5.3 mmol/L    Chloride 103 98 - 107 mmol/L    Bicarbonate 26 21 - 32 mmol/L    Anion Gap 14 10 - 20 mmol/L    Urea Nitrogen 11 6 - 23 mg/dL    Creatinine 0.47 (L) 0.50 - 1.05 mg/dL    eGFR >90 >60 mL/min/1.73m*2    Calcium 8.5 (L) 8.6 - 10.3 mg/dL    Albumin 3.5 3.4 - 5.0 g/dL    Alkaline Phosphatase 110 33 - 110 U/L    Total Protein 6.9 6.4 - 8.2 g/dL    AST 72 (H) 9 - 39 U/L    Bilirubin, Total 0.6 0.0 - 1.2 mg/dL    ALT 29 7 - 45 U/L   hCG, quantitative, pregnancy   Result Value Ref Range    HCG, Beta-Quantitative <2 <5 mIU/mL   Morphology   Result Value Ref Range    RBC Morphology See Below     Stomatocytes Few    Reticulocytes   Result Value Ref Range    Retic % 1.8 0.5 - 2.0 %    Retic Absolute 0.076 0.018 - 0.083 x10*6/uL    Reticulocyte Hemoglobin 19 (L) 28 - 38 pg    Immature Retic fraction 27.4 (H) <=16.0 %   LDH, Lactate dehydrogenase   Result Value Ref Range     84 - 246 U/L   Lactate   Result Value Ref Range    Lactate 2.0 0.4 - 2.0 mmol/L   Blood Culture    Specimen: Peripheral Venipuncture; Blood culture   Result Value Ref Range    Blood Culture Loaded on Instrument - Culture in progress    Blood Culture    Specimen: Peripheral Venipuncture; Blood culture   Result Value Ref Range    Blood Culture Loaded on  Instrument - Culture in progress    Type and Screen   Result Value Ref Range    ABO TYPE O     Rh TYPE POS     ANTIBODY SCREEN NEG    Prepare RBC: 1 Units   Result Value Ref Range    PRODUCT CODE R6689L70     Unit Number [id number]     Unit ABO O     Unit RH POS     XM INTEP COMP     Dispense Status IS     Blood Expiration Date 3/4/2025 11:59:00 PM EST     PRODUCT BLOOD TYPE 5100     UNIT VOLUME 350    Verify ABO/Rh Group Test (VERAB)   Result Value Ref Range    ABO TYPE O     Rh TYPE POS    Coagulation Screen   Result Value Ref Range    Protime 12.7 9.8 - 12.8 seconds    INR 1.1 0.9 - 1.1    aPTT 29 27 - 38 seconds   Haptoglobin   Result Value Ref Range    Haptoglobin 340 (H) 30 - 200 mg/dL   Folate   Result Value Ref Range    Folate, Serum 4.8 (L) >5.0 ng/mL   Protein S Activity   Result Value Ref Range    Protein S Activity 94 64 - 150 % activity   Protein C Activity   Result Value Ref Range    Protein C Activity 79 70 - 150 % activity   Factor 8 Activity   Result Value Ref Range    Factor VIII Activity 226 (H) 55 - 180 %   Heparin Assay, UFH   Result Value Ref Range    Heparin Unfractionated 0.8 See Comment Below for Therapeutic Ranges IU/mL   CBC and Auto Differential   Result Value Ref Range    WBC 17.6 (H) 4.4 - 11.3 x10*3/uL    nRBC 0.0 0.0 - 0.0 /100 WBCs    RBC 3.98 (L) 4.00 - 5.20 x10*6/uL    Hemoglobin 6.6 (L) 12.0 - 16.0 g/dL    Hematocrit 24.9 (L) 36.0 - 46.0 %    MCV 63 (L) 80 - 100 fL    MCH 16.6 (L) 26.0 - 34.0 pg    MCHC 26.5 (L) 32.0 - 36.0 g/dL    RDW 21.1 (H) 11.5 - 14.5 %    Platelets 549 (H) 150 - 450 x10*3/uL    Immature Granulocytes %, Automated 0.8 0.0 - 0.9 %    Immature Granulocytes Absolute, Automated 0.14 0.00 - 0.70 x10*3/uL   Renal function panel   Result Value Ref Range    Glucose 138 (H) 74 - 99 mg/dL    Sodium 140 136 - 145 mmol/L    Potassium 4.0 3.5 - 5.3 mmol/L    Chloride 104 98 - 107 mmol/L    Bicarbonate 24 21 - 32 mmol/L    Anion Gap 16 10 - 20 mmol/L    Urea Nitrogen 7  6 - 23 mg/dL    Creatinine 0.43 (L) 0.50 - 1.05 mg/dL    eGFR >90 >60 mL/min/1.73m*2    Calcium 8.7 8.6 - 10.3 mg/dL    Phosphorus 3.0 2.5 - 4.9 mg/dL    Albumin 3.4 3.4 - 5.0 g/dL   Iron and TIBC   Result Value Ref Range    Iron <10 (L) 35 - 150 ug/dL    UIBC 359 110 - 370 ug/dL    TIBC      % Saturation     Ferritin   Result Value Ref Range    Ferritin 14 8 - 150 ng/mL   Manual Differential   Result Value Ref Range    Neutrophils %, Manual 89.0 40.0 - 80.0 %    Bands %, Manual 2.0 0.0 - 5.0 %    Lymphocytes %, Manual 7.0 13.0 - 44.0 %    Monocytes %, Manual 2.0 2.0 - 10.0 %    Eosinophils %, Manual 0.0 0.0 - 6.0 %    Basophils %, Manual 0.0 0.0 - 2.0 %    Seg Neutrophils Absolute, Manual 15.66 (H) 1.20 - 7.00 x10*3/uL    Bands Absolute, Manual 0.35 0.00 - 0.70 x10*3/uL    Lymphocytes Absolute, Manual 1.23 1.20 - 4.80 x10*3/uL    Monocytes Absolute, Manual 0.35 0.10 - 1.00 x10*3/uL    Eosinophils Absolute, Manual 0.00 0.00 - 0.70 x10*3/uL    Basophils Absolute, Manual 0.00 0.00 - 0.10 x10*3/uL    Total Cells Counted 100     Neutrophils Absolute, Manual 16.01 (H) 1.20 - 7.70 x10*3/uL    RBC Morphology See Below     Polychromasia Mild     Hypochromia Mild     Acanthocytes Few     Stomatocytes Few     Giant Platelets Few    C-reactive protein   Result Value Ref Range    C-Reactive Protein 0.89 <1.00 mg/dL   Hemoglobin and hematocrit, blood   Result Value Ref Range    Hemoglobin 6.8 (L) 12.0 - 16.0 g/dL    Hematocrit 25.1 (L) 36.0 - 46.0 %   Prepare RBC: 1 Units   Result Value Ref Range    PRODUCT CODE L7741S70     Unit Number [id number]     Unit ABO O     Unit RH POS     XM INTEP COMP     Dispense Status XM     Blood Expiration Date 3/6/2025 11:59:00 PM EST     PRODUCT BLOOD TYPE 5100     UNIT VOLUME 350    Heparin Assay   Result Value Ref Range    Heparin Unfractionated 0.5 See Comment Below for Therapeutic Ranges IU/mL       Physical Exam:  Subjective:   Examination:   General Examination:   General Appearance:  Well developed, well nourished, in no acute distress.   Head: normocephalic, atraumatic   Eyes: Anicteric sclera. Pupils are equally round and reactive to light.  Extraocular movements are intact.    Ears: normal   Oral: Cavity: mucosa moist.   Throat: clear.   Neck/Thyroid: neck supple, full range of motion, no cervical lymphadenopathy.   Skin: warm and dry, no suspicious lesions.    Heart: regular rate and rhythm, S1, S2 normal, no murmurs.   Lungs: clear to auscultation bilaterally.   Abdomen: soft, non-tender, non-distended, bowl sound present, normal.   Extremities: no clubbing, no cyanosis, no edema.   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        Andreas Ortega MD

## 2025-02-11 NOTE — ED NOTES
"Per security, patient was in wheelchair, coming in from EMS, patient walked to the counter, asking to come back, sat down and then layed on the the floor. PT did NOT fall per security who was talking to patient the entire time, Patient then refused to speak to staff, the PA was called to assess patient on floor, patient refused to open her eyes for PA. Patient placed on backboard and brought back to ER, vitals done. Patient screaming \"I Need an MRI, there is something wrong with my head\". Informed patient that bloodwork and vitals were needed first, patient screamed that she did not want an IV.      Kary Vu RN  02/10/25 2035    "

## 2025-02-11 NOTE — H&P
Internal Medicine    Admission H&P      Patient Shreya Garces PCP No Assigned PCP Generic Provider, MD    MRFAUSTO [id number]  Admission Date 2/10/2025      Chief Complaint/Reason for Admission:  Shreya is a 34 y.o. female who presented today with headache    Subjective    Subjective   History of Presenting Illness  Ms. Shreya Garces is a 34 y.o. female with a past medical history of lacunar stroke ( July 2018), HTN, HLD, migraine headache, DUSTIN and depression presented with headache of 10 days duration. Patient stated that she has had flu-like symptoms for the past 3 weeks and states she was tested for COVID and Flu and the result was negative.  She states that she has frontal headache with pressure like sensation behind the right eye which has been worsening. She endorses fever up to 104 °F and stated the fever started three weeks ago.  She reports dry cough. She also endorses nausea and non-bloody vomiting . Otherwise she denies chest pain, shortness of breath, palpitation. She denies abdominal pain, dysuria or hematuria.   In the ED neurology [facility]  was consulted and  would not do thrombectomy unless the patient condition deteriorates being on heparin and to transfer if she deteriorates.     ED course:  V/S: /66,   NY 43,  RR 16, T 36.1 and SpO2 of 99% on RA.   Labs: CBC shows leukocytosis with WBC of 21.4 and anemia with H/H of 7.1 and MCV of 63. CMP is significant for hypokalemia of 3.4, otherwise normal electrolytes, Cr 0.47 and BUN 11. Lactate wnl. HCG <2.     Imaging: CT head wo IV contrast and CT venogram head w and wo IV contrast showed Hyperdense thrombus on unenhanced imaging with corresponding filling  defects on CT venogram noted within the posterior superior sagittal sinus, the straight sinus, the right transverse sinus and the right sigmoid sinus consistent with extensive posterior dural venous thrombosis. Diminutive appearance of the left transverse sinus which may be  "developmental. No evidence of hemorrhagic infarct.    Intervention: Ceftriaxone 2 g IV, dexamethasone 4 mg IV, diphenhydramine tab 12.5, fentanyl 25 mcg IV, heparin bolus 4968 U IV, Dilaudid 0.5 mg IV x 2, magnesium sulfate 1 g IV, metronidazole 500 mg IV, Compazine 10 mg IV, Depakote 500 mg IV, vancomycin 1.5 mg IV, heparin infusion      Past Medical History  Active Ambulatory Problems     Diagnosis Date Noted    Current smoker 02/07/2024    Depression 08/15/2014    DUSTIN (generalized anxiety disorder) 10/29/2019    History of gastric bypass 10/19/2021    History of lacunar cerebrovascular accident (CVA) 02/07/2024    Hypertension 10/25/2013    Lacunar stroke (Multi) 07/25/2018    HLD (hyperlipidemia) 08/15/2014    Morbid obesity (Multi) 02/10/2025    Prediabetes 08/16/2019    Transient ischemic attack 07/25/2018    NAFLD (nonalcoholic fatty liver disease) 09/11/2020    JULISSA (obstructive sleep apnea) 07/23/2020     Resolved Ambulatory Problems     Diagnosis Date Noted    No Resolved Ambulatory Problems     Past Medical History:   Diagnosis Date    Other conditions influencing health status     Personal history of other diseases of the circulatory system     Personal history of other diseases of the nervous system and sense organs          Home Medication:  Prior to Admission medications    Not on File          Allergies:  Allergies   Allergen Reactions    Atorvastatin Myalgia    Penicillins Rash     7/21 per pt rxn as a child    Childhood         Review of System:  Pertinent positives and negatives as per history of present illness. Remainder of 10 point review of systems is negative.    Objective    Objective   Vital Signs:  Visit Vitals  BP (!) 183/112   Pulse (!) 46   Temp 36.2 °C (97.2 °F)   Resp 11   Ht 1.626 m (5' 4\")   Wt 62.1 kg (137 lb)   SpO2 98%   BMI 23.52 kg/m²   Smoking Status Never   BSA 1.67 m²        Physical Examination:    Constitutional: A&Ox4,   Head and Face: Atraumatic, normocephalic   Eyes: " Normal external exam, EOMI  ENT: Normal external inspection of ears and nose  Cardiovascular: S1/S2, no murmurs, rubs, or gallops, radial pulses +2  Pulmonary:  no respiratory distress, no wheezing, rales or rhonchi, on RA  Abdomen: +BS, soft, non-tender, nondistended, no guarding rigidity or rebound tenderness, no masses noted  MSK: Negative for edema, No joint swelling, normal movements of all extremities.   skin- No lesions, contusions, or erythema.  Neuro: No gross motor or sensory deficits, CN's are grossly intact.  Psychiatric: Judgment intact. Appropriate mood, affect and behavior    Laboratory Data:    Results from last 7 days   Lab Units 02/10/25  2232   WBC AUTO x10*3/uL 21.4*   RBC AUTO x10*6/uL 4.25   HEMOGLOBIN g/dL 7.1*     Results from last 7 days   Lab Units 02/10/25  2232   SODIUM mmol/L 140   POTASSIUM mmol/L 3.4*   CHLORIDE mmol/L 103   CO2 mmol/L 26   BUN mg/dL 11   CREATININE mg/dL 0.47*   CALCIUM mg/dL 8.5*   BILIRUBIN TOTAL mg/dL 0.6   ALT U/L 29   AST U/L 72*       Imaging:  CT head wo IV contrast    Result Date: 2/11/2025  Interpreted By:  Savannah Tipton, STUDY: CT HEAD WO IV CONTRAST; CT VENOGRAM HEAD W AND WO IV CONTRAST AND POST PROCEDURE;  2/10/2025 11:28 pm; 2/11/2025 12:22 am   INDICATION: Signs/Symptoms:headache, subjective visual change; Signs/Symptoms:Concern for right transverse.   COMPARISON: None.   ACCESSION NUMBER(S): [id number]; [id number]   ORDERING CLINICIAN: BETTE JARA   TECHNIQUE: Axial noncontrast CT images of the head. Contrast-enhanced CT venogram.   FINDINGS: BRAIN PARENCHYMA: On the unenhanced imaging there is hyperdense attenuation within the posterosuperior sagittal sinus as well as the straight sinus, confluence and predominantly the right transverse sinus extending into the right sigmoid sinus. Findings are consistent with acute dural sinus thrombosis.   Gray-white matter interfaces are preserved. No mass effect or midline shift.   HEMORRHAGE: No acute  intracranial hemorrhage. VENTRICLES and EXTRA-AXIAL SPACES: Normal size. EXTRACRANIAL SOFT TISSUES: Within normal limits. PARANASAL SINUSES/MASTOIDS: Mucosal thickening of the right maxillary sinus. Fluid throughout the left maxillary sinus, left ethmoid air cells on the left frontal sinus. CALVARIUM: No depressed skull fracture. No destructive osseous lesion.   Filling defect following contrast administration noted in the posterior superior sagittal sinus extending into the confluence. There is no significant contrast opacification within the straight sinus, the right transverse sinus or the right sigmoid sinus. The left transverse sinus is slightly diminutive which could be developmental versus related to partial filling defects. The internal cerebral veins and vein of Conor appears patent.   OTHER FINDINGS: None.       Hyperdense thrombus on unenhanced imaging with corresponding filling defects on CT venogram noted within the posterior superior sagittal sinus, the straight sinus, the right transverse sinus and the right sigmoid sinus consistent with extensive posterior dural venous thrombosis. Diminutive appearance of the left transverse sinus which may be developmental.   No evidence of hemorrhagic infarct.   MACRO: Savannah Tipton discussed the significance and urgency of this critical finding by telephone with Babak Elliott on 2/11/2025 at 1:31 am.  (**-RCF-**) Findings:  See findings.   Signed by: Savannah Tipton 2/11/2025 1:37 AM Dictation workstation:   [id number]    CT venogram head w and wo iv contrast    Result Date: 2/11/2025  Interpreted By:  Savannah Tipton, STUDY: CT HEAD WO IV CONTRAST; CT VENOGRAM HEAD W AND WO IV CONTRAST AND POST PROCEDURE;  2/10/2025 11:28 pm; 2/11/2025 12:22 am   INDICATION: Signs/Symptoms:headache, subjective visual change; Signs/Symptoms:Concern for right transverse.   COMPARISON: None.   ACCESSION NUMBER(S): [id number]; [id number]   ORDERING CLINICIAN: BETTE JARA    TECHNIQUE: Axial noncontrast CT images of the head. Contrast-enhanced CT venogram.   FINDINGS: BRAIN PARENCHYMA: On the unenhanced imaging there is hyperdense attenuation within the posterosuperior sagittal sinus as well as the straight sinus, confluence and predominantly the right transverse sinus extending into the right sigmoid sinus. Findings are consistent with acute dural sinus thrombosis.   Gray-white matter interfaces are preserved. No mass effect or midline shift.   HEMORRHAGE: No acute intracranial hemorrhage. VENTRICLES and EXTRA-AXIAL SPACES: Normal size. EXTRACRANIAL SOFT TISSUES: Within normal limits. PARANASAL SINUSES/MASTOIDS: Mucosal thickening of the right maxillary sinus. Fluid throughout the left maxillary sinus, left ethmoid air cells on the left frontal sinus. CALVARIUM: No depressed skull fracture. No destructive osseous lesion.   Filling defect following contrast administration noted in the posterior superior sagittal sinus extending into the confluence. There is no significant contrast opacification within the straight sinus, the right transverse sinus or the right sigmoid sinus. The left transverse sinus is slightly diminutive which could be developmental versus related to partial filling defects. The internal cerebral veins and vein of Conor appears patent.   OTHER FINDINGS: None.       Hyperdense thrombus on unenhanced imaging with corresponding filling defects on CT venogram noted within the posterior superior sagittal sinus, the straight sinus, the right transverse sinus and the right sigmoid sinus consistent with extensive posterior dural venous thrombosis. Diminutive appearance of the left transverse sinus which may be developmental.   No evidence of hemorrhagic infarct.   MACRO: Savannah Tipton discussed the significance and urgency of this critical finding by telephone with Babak Elliott on 2/11/2025 at 1:31 am.  (**-RCF-**) Findings:  See findings.   Signed by: Savannah Tipton  2/11/2025 1:37 AM Dictation workstation:   [id number]      Medications:  Scheduled medications  iohexol, 75 mL, intravenous, Once in imaging  vancomycin, 1.5 g, intravenous, Once      Continuous medications  heparin, 0-4,500 Units/hr, Last Rate: 1,100 Units/hr (02/11/25 0225)      PRN medications  PRN medications: heparin    Assessment    Assessment & Plan   Ms. Shreya Garces is a 34 y.o. female with a past medical history of lacunar stroke ( July 2018), HTN, HLD, migraine headache, DUSTIN and depression admitted with a diagnosis of cerebral venous thrombosis.  In the ED neurology was consulted and recommended to start her on heparin infusion and to neuro checks q2hr and to transfer the patient [place] [facility] [place].   Assessment & Plan  Dural venous sinus thrombosis ([facility])    # Right superior sagittal and sigmoid venous sinus thrombosis   # Hx of lacunar infarction  # C/F meningitis  - She presented with headache. Patient states that the the headache has been going on for the past 10 days. She has had flu-like symptoms for the past 3 weeks.  She localizes the headache more behind the right eye and its worsening through time.  - CT head venogram w and wo IV contrast suggestive dural venous sinus thrombosis.  - Differential is broad and could be related to underlying infection, dehydration hypercoagulable state including malignancy. Specially with previous history of stroke hypercoagulable state needs to be ruled out.  - NIH stroke scale of 0.  - Neurology consulted and recommended to start her on heparin infusion and transfer her to [facility] [place].  - Neurology recommended to avoid hypotension SBP < 100  Plan  - Admit to the floor  - Will start her on high dose ceftriaxone, vancomycin and acyclovir.   - Continue heparin infusion per the protocol  - Neuro check Q2hr  - Avoid hypotension SBP< 100  - Monitor vital signs  - Daily labs with RFT and electrolytes  - protein S/C pending  -Antithrombin 2  antigen pending  -Homocystine pending  -Factor VIII activity pending  -Lupus anticoagulant pending  -Haptoglobin pending  - Neurology consultation  - PT/OT    # Hypokalemia  -Potassium on admission is 3.4, repleted with p.o. Kcl  -Monitor potassium    # Leukocytosis  # Thrombocytosis  # Microcytic anemia  -H/H on admission is 7.1/26.7 with MCV of 63, Hgb on 6/10/2024 was 9.8  -WBC on admission 21.4, platelet count admission is 595  -No signs of active bleeding/no hematuria or hematemesis  -UA is negative for UTI  -Monitor for fever and daily CBC        CHRONIC PROBLEMS:  # HTN  # HLD  # Migraine headache  # DUSTIN  # Depression          Global Plan of Care  Fluid: PRN  Electrolytes: PRN  Nutrition:  Cardiac  DVT Prophylaxis:  Heparin  GI Prophylaxis:  Code Status: Full Code     Emergency Contact: Extended Emergency Contact Information  Primary Emergency Contact: Melisa Garces Phone: [phone]  Relation: Other    Patient to be staffed with attending physician.  Signature: Otoniel Esposito MD PGY1 IM resident  Date: February 11, 2025  This note has been transcribed using Dragon voice recognition system and there is a possibility of unintentional typing misprints.  Any information found to be copied from previous providers is done in the best interest of the patient to provide accurate, quality, and continuity of care.       Day Team addendum  Patient seen and was uncomfortable this morning.  Pain adequately controlled with Dilaudid.  Patient noted to have a hemoglobin of 6.6 therefore 1 unit of blood was administered over 2 hours.  Patient consented.  Neurosurgery had no additional recommendations, ID recommended to discontinue acyclovir and continue Vanco and ceftriaxone as well as follow-up on CSF findings.  LP to be done by radiology around 9 to 10 AM 2/12/2025.  They recommended that there is no need to hold heparin for this procedure.  Cardiology consulted for bradycardia and will get echocardiogram.   Coagulation workup still pending.  Chest x-ray 2 view showed question of left basilar infiltrate or atelectasis.

## 2025-02-11 NOTE — CARE PLAN
Patient admitted on 2/10 for a dural venous sinus thrombosis. Heparin gtt maintained per protocol, now therapeutic. 1 unit PRBC given for a hemoglobin of 6.6. HR 30-50bpm. General medicine team and cardiology aware. Bps also elevated, team aware. PRN dilaudid given per orders. Patient currently in MRI. Awaiting another urine sample to send to lab. Safety maintained.     Problem: Pain  Goal: Takes deep breaths with improved pain control throughout the shift  Outcome: Progressing  Goal: Free from opioid side effects throughout the shift  Outcome: Met     Problem: Discharge Planning  Goal: Discharge to home or other facility with appropriate resources  Outcome: Progressing     Problem: Chronic Conditions and Co-morbidities  Goal: Patient's chronic conditions and co-morbidity symptoms are monitored and maintained or improved  Outcome: Progressing     Problem: Nutrition  Goal: Nutrient intake appropriate for maintaining nutritional needs  Outcome: Progressing     Problem: Arrythmia/Dysrhythmia  Goal: Lab values return to normal range  Outcome: Progressing  Goal: Serial ECG will return to baseline  Outcome: Not Progressing  Goal: Vital signs return to baseline  Outcome: Not Progressing

## 2025-02-11 NOTE — ED PROVIDER NOTES
"EMERGENCY DEPARTMENT ENCOUNTER      Pt Name: Shreya Garces  MRN: [id number]  Birthdate 1990  Date of evaluation: 2/10/2025  Provider: Babak Rodriguez DO    CHIEF COMPLAINT       Chief Complaint   Patient presents with    Headache     HISTORY OF PRESENT ILLNESS    Shreya Garces is a 34 y.o. year old female who presents to the ER for headache.  States she has had 3 weeks of flulike symptoms, 1.5 weeks of right eye blurred vision, and for the last \"half a week\" she has had constant frontal head pressure which has been worsening. Does endorse fever up to 104, unsure when she had her last fever, first fever occurred 3 weeks ago. Does endorse nausea, vomiting, increased urinary frequency , and diarrhea however she does not know how many times she has vomited, voided, or had bowel movements. Denies dysuria, hematuria, bloody stools, bloody vomit, epistaxis, or abdominal pain.    Does report approximately 1 week of sinus congestion and green mucus production as well as facial pain, denies changes to smell or taste    PMH migraine, hypertension, HLD, NAFLD, lacunar CVA, DUSTIN, depression, JULISSA  PSH c/s, gastric bypass, tummy tuck, arm lift, and panniculectomy   NKDA  PAST MEDICAL HISTORY     Past Medical History:   Diagnosis Date    Other conditions influencing health status     History of back problems    Personal history of other diseases of the circulatory system     History of essential hypertension    Personal history of other diseases of the nervous system and sense organs     History of migraine     CURRENT MEDICATIONS       Previous Medications    No medications on file     SURGICAL HISTORY       Past Surgical History:   Procedure Laterality Date     SECTION, CLASSIC  2017     Section    MR HEAD ANGIO WO IV CONTRAST  2019    MR HEAD ANGIO WO IV CONTRAST 2019 [facility] [place]CILLARY LEGACY     ALLERGIES     Atorvastatin and Penicillins  FAMILY HISTORY     No family history on file.  SOCIAL " HISTORY       Social History     Tobacco Use    Smoking status: Never    Smokeless tobacco: Never   Vaping Use    Vaping status: Every Day    Substances: Nicotine    Devices: Disposable   Substance Use Topics    Alcohol use: Yes     Comment: Occasionally    Drug use: Not on file     Comment: Occasionally     PHYSICAL EXAM  (up to 7 for level 4, 8 or more for level 5)     ED Triage Vitals [02/10/25 2032]   Temperature Heart Rate Respirations BP   36.1 °C (97 °F) (!) 43 16 128/66      Pulse Ox Temp Source Heart Rate Source Patient Position   99 % Temporal Monitor Lying      BP Location FiO2 (%)     Right arm --       Physical Exam  Vitals and nursing note reviewed.   Constitutional:       General: She is not in acute distress.     Appearance: Normal appearance. She is not ill-appearing.   HENT:      Head: Normocephalic and atraumatic. No raccoon eyes, Jacobo's sign, contusion or laceration.      Jaw: No trismus or malocclusion.      Right Ear: External ear normal.      Left Ear: External ear normal.      Nose:      Right Sinus: Frontal sinus tenderness present. No maxillary sinus tenderness.      Left Sinus: Frontal sinus tenderness present. No maxillary sinus tenderness.      Mouth/Throat:      Mouth: Mucous membranes are moist.      Pharynx: Oropharynx is clear.   Eyes:      Extraocular Movements: Extraocular movements intact.      Pupils: Pupils are equal, round, and reactive to light.   Neck:      Trachea: No tracheal deviation.   Cardiovascular:      Rate and Rhythm: Regular rhythm. Bradycardia present.      Pulses: Normal pulses.   Pulmonary:      Effort: No respiratory distress.      Breath sounds: No wheezing, rhonchi or rales.   Chest:      Chest wall: No tenderness.   Abdominal:      General: Abdomen is flat.      Palpations: Abdomen is soft. There is no mass.      Tenderness: There is no abdominal tenderness.   Musculoskeletal:         General: No tenderness or signs of injury.      Right lower leg: No  edema.      Left lower leg: No edema.   Skin:     Coloration: Skin is pale. Skin is not jaundiced.      Findings: No petechiae, rash or wound.   Neurological:      Mental Status: She is alert.      Sensory: No sensory deficit.      Motor: No weakness.     Coordination, patient will not walk for us, has no drift, normal strength 5/5 in upper extremity and lower extremity with joint isolation in shoulders, elbows, wrists, , hips, knees, ankles.  Oriented x 4.    DIAGNOSTIC RESULTS   LABS:  Labs Reviewed   CBC WITH AUTO DIFFERENTIAL - Abnormal       Result Value    WBC 21.4 (*)     nRBC 0.0      RBC 4.25      Hemoglobin 7.1 (*)     Hematocrit 26.7 (*)     MCV 63 (*)     MCH 16.7 (*)     MCHC 26.6 (*)     RDW 21.2 (*)     Platelets 595 (*)     Neutrophils % 90.4      Immature Granulocytes %, Automated 0.8      Lymphocytes % 5.5      Monocytes % 3.0      Eosinophils % 0.1      Basophils % 0.2      Neutrophils Absolute 19.29 (*)     Immature Granulocytes Absolute, Automated 0.17      Lymphocytes Absolute 1.18 (*)     Monocytes Absolute 0.65      Eosinophils Absolute 0.03      Basophils Absolute 0.04     COMPREHENSIVE METABOLIC PANEL - Abnormal    Glucose 183 (*)     Sodium 140      Potassium 3.4 (*)     Chloride 103      Bicarbonate 26      Anion Gap 14      Urea Nitrogen 11      Creatinine 0.47 (*)     eGFR >90      Calcium 8.5 (*)     Albumin 3.5      Alkaline Phosphatase 110      Total Protein 6.9      AST 72 (*)     Bilirubin, Total 0.6      ALT 29     RETICULOCYTES - Abnormal    Retic % 1.8      Retic Absolute 0.076      Reticulocyte Hemoglobin 19 (*)     Immature Retic fraction 27.4 (*)    HUMAN CHORIONIC GONADOTROPIN, SERUM QUANTITATIVE - Normal    HCG, Beta-Quantitative <2      Narrative:      Total HCG measurement is performed using the Vik[employer] Patterson Access   Immunoassay which detects intact HCG and free beta HCG subunit.    This test is not indicated for use as a tumor marker.   HCG testing is performed  using a different test methodology at [facility] than other [facility]. Direct result comparison   should only be made within the same method.       LACTATE - Normal    Lactate 2.0      Narrative:     Venipuncture immediately after or during the administration of Metamizole may lead to falsely low results. Testing should be performed immediately prior to Metamizole dosing.   COAGULATION SCREEN - Normal    Protime 12.7      INR 1.1      aPTT 29      Narrative:     The APTT is no longer used for monitoring Unfractionated Heparin Therapy. For monitoring Heparin Therapy, use the Heparin Assay.   LACTATE DEHYDROGENASE - Normal         BLOOD CULTURE   BLOOD CULTURE   TYPE AND SCREEN    ABO TYPE O      Rh TYPE POS      ANTIBODY SCREEN NEG     VERAB/VERIFY ABORH    ABO TYPE O      Rh TYPE POS     URINALYSIS WITH REFLEX CULTURE AND MICROSCOPIC    Narrative:     The following orders were created for panel order Urinalysis with Reflex Culture and Microscopic.  Procedure                               Abnormality         Status                     ---------                               -----------         ------                     Urinalysis with Reflex C...[[id number]]                                                 Extra Urine Gray Tube[[id number]]                                                         Please view results for these tests on the individual orders.   URINALYSIS WITH REFLEX CULTURE AND MICROSCOPIC   EXTRA URINE GRAY TUBE   SARS-COV-2 AND INFLUENZA A/B PCR   HAPTOGLOBIN   PROTEIN S ACTIVITY   PROTEIN C ACTIVITY   ANTITHROMBIN III ANTIGEN   HOMOCYSTEINE   FACTOR 8 ACTIVITY   FACTOR V LEIDEN   JAK2 V617F MUTATION BY PCR, QUAL   LUPUS ANTICOAG. WITH INTERPRETATION[BARRERA]   PROTHROMBIN GENE MUTATION ANALYSIS   HEPARIN ASSAY   PREPARE RBC    PRODUCT CODE [id number]      Unit Number [id number]      Unit ABO O      Unit RH POS      XM INTEP COMP      Dispense Status XM      Blood Expiration  Date 3/4/2025 11:59:00 PM EST      PRODUCT BLOOD TYPE 5100      UNIT VOLUME 350     MORPHOLOGY    RBC Morphology See Below      Stomatocytes Few       All other labs were within normal range or not returned as of this dictation.  Imaging  CT venogram head w and wo iv contrast   Final Result   Hyperdense thrombus on unenhanced imaging with corresponding filling   defects on CT venogram noted within the posterior superior sagittal   sinus, the straight sinus, the right transverse sinus and the right   sigmoid sinus consistent with extensive posterior dural venous   thrombosis. Diminutive appearance of the left transverse sinus which   may be developmental.        No evidence of hemorrhagic infarct.        MACRO:   Savannah Tipton discussed the significance and urgency of this   critical finding by telephone with Babak Elliott on 2/11/2025 at 1:31   am.  (**-RCF-**) Findings:  See findings.        Signed by: Savannah Tipton 2/11/2025 1:37 AM   Dictation workstation:   [id number]      CT head wo IV contrast   Final Result   Hyperdense thrombus on unenhanced imaging with corresponding filling   defects on CT venogram noted within the posterior superior sagittal   sinus, the straight sinus, the right transverse sinus and the right   sigmoid sinus consistent with extensive posterior dural venous   thrombosis. Diminutive appearance of the left transverse sinus which   may be developmental.        No evidence of hemorrhagic infarct.        MACRO:   Savannah Tipton discussed the significance and urgency of this   critical finding by telephone with Babak Elliott on 2/11/2025 at 1:31   am.  (**-RCF-**) Findings:  See findings.        Signed by: Savannah Tipton 2/11/2025 1:37 AM   Dictation workstation:   [id number]         Procedure  Procedures  EMERGENCY DEPARTMENT COURSE/MDM:   Medical Decision Making    Vitals:    Vitals:    02/11/25 0145 02/11/25 0200 02/11/25 0215 02/11/25 0230   BP:   151/86 (!) 167/92   BP Location:        Patient Position:       Pulse: (!) 40 50 (!) 43 (!) 37   Resp: 17 10 11 12   Temp:    36.2 °C (97.2 °F)   TempSrc:       SpO2: 98% 97% 97% 98%   Weight:       Height:         Shreya Garces is a female 34 y.o. who presents to the ER for headache. On arrival the patients vital signs were: Afebrile, Bradycardic, Normotensive, Regular RR, and Normoxic on room air. History obtained from: patient.  Given headache, right blurry vision, persistent vomiting, frequent urination and diarrhea will provide migraine cocktail to include Compazine, Benadryl, fluids.  Will also obtain basic labs and CT head.  Given at least 1 week of sinus congestion, green rhinorrhea, facial pain and frontal sinus tenderness will treat for sinus infection with Augmentin.  Given unilateral blurry vision will obtain visual acuities, check intraocular pressures.    ED Course as of 02/11/25 0323   Mon Feb 10, 2025   2223 IOP 20 L, IOP 20 R. [CB]   2315 Patient has a nontender soft, nonacute abdomen.  She reports that the frontal headache she has is severe, and has had no improvement with Compazine, Benadryl, fluids.  She and her aunt at bedside are very concerned about the severity of the pain.  Sending for CT head, stepping up migraine cocktail with Decadron, magnesium, Depacon []   2315 On repeat assessment headache persists will expand with additional migraine cocktail medications including Decadron, magnesium, valproic acid [CB]   2334 HCG, Beta-Quantitative: <2  Not pregnant [CB]   Tue Feb 11, 2025   0001 Patient has had no improvement of symptoms, giving 1 dose of fentanyl, sending back for CTV as I am concerned on my read that patient has a right transverse sinus abnormality (infectious vs clot) [JH]   0005 CBC and Auto Differential(!)  Leukocytosis, microcytic anemia, thrombocytosis.  Given suspected complicated sinusitis will start bank Rocephin Flagyl.  No neutropenia, history of diabetes, or immunosuppression, will not provide  antifungal coverage at this time. [CB]   0020 CT venogram head w and wo iv contrast  On my read, I am concerned about a sagittal sinus/right transverse sinus clot, but with hyperdense punctate regions, also cannot rule out infectious abscess with partially opacified anterior sinuses, treating with broad spectrum CNS abx [JH]   0110 Lactate: 2.0  Not elevated doubt occult septic shock [CB]   0111 Comprehensive metabolic panel(!)  Nonspecific elevation of AST, mild hypokalemia, otherwise no acute electrolyte or renal abnormality [CB]   0132 Discussed with radiology, Dr. Tipton. Scan positive for extensive dural sinus thrombus. In sagittal, straight, right sigmoid, and right transverse sinuses. No mass effect or edema, no inflammatory changes. Would call neurology first, possibly vascular in case she needs thrombectomy. Has high risk of intracranial hemorrhage.  [CB]   0140 Discussed with Dr. Vigil who recommended discussing with telestroke team.  [CB]   0154 [place]. Arriaga[place] [facility] [place] not thrombectomy if not worsening despite heparin. Even if she does have hemorrhagic conversion would still heparinize.    Protein S, C, AT3, homocysteine, antiphospholipid antibody, chuy viper venom, factor V, factor VIII activity, coags, prothombin gene mutation, Jesús 2. Draw labs first, then start heparin. [place]'t need [place] [facility] [place], would transfer if she deteriorates.  [CB]   0221 EKG 0156 Rhythm: Sinus arrhythmia  Ventricular rate: bradycardic Intervals (-200 /QRS  /QT >450M or >470F):  QRS 84 Qtc 458 Blocks: None Potential signs of ischemia: No pathological Q waves, contiguous ST elevations or depressions, biphasic T waves, or  T wave inversions [CB]   0221 Visual acuity L 20/50 R 20/50 B/l 20/50 [CB]   0222 Reticulocytes(!)  Reticulocyte production index 0.8, hypoproliferative [CB]   0301 Coagulation Screen  Normal coags [CB]   0321 Telestroke recommends Q2 neuro  checks, normotension goal.  [CB]      ED Course User Index  [CB] Babak Rodriguez DO  [JH] Sukhwinder Walsh MD         Diagnoses as of 02/11/25 0323   Dural venous sinus thrombosis (HHS-HCC)   Bradycardia     External Records Reviewed: I reviewed recent and relevant outside records including inpatient notes, outpatient records  Prescription Drug Consideration: Augmentin discontinued, Rocephin Vanco and Flagyl ordered for complicated sinusitis    Shared decision making for disposition  Patient and/or patient´s representative was counseled regarding labs, imaging, likely diagnosis. All questions were answered. Recommendation was made for Admission given the need for further escalation of care to inpatient management. Patient agreed and was admitted in stable condition. Admitting team was notified of any pending labs or imaging to ensure continuity of care.     ED Medications administered this visit:    Medications   iohexol (OMNIPaque) 350 mg iodine/mL solution 75 mL (has no administration in time range)   vancomycin (Vancocin) 1.5 g in sodium chloride 0.9%  mL (1.5 g intravenous New Bag 2/11/25 0256)   heparin 25,000 Units in dextrose 5% 250 mL (100 Units/mL) infusion (premix) (1,100 Units/hr intravenous New Bag 2/11/25 0225)   heparin bolus from bag 2,000-4,000 Units (has no administration in time range)   sodium chloride 0.9 % bolus 1,000 mL (0 mL intravenous Stopped 2/10/25 2337)   prochlorperazine (Compazine) injection 10 mg (10 mg intravenous Given 2/10/25 2239)   diphenhydrAMINE (Sominex) tablet 12.5 mg (12.5 mg oral Given 2/10/25 2239)   dexAMETHasone (Decadron) injection 4 mg (4 mg intravenous Given 2/10/25 2340)   valproate (Depacon) 500 mg in dextrose 5% 50 mL IV (0 mg intravenous Stopped 2/11/25 0219)   magnesium sulfate 1 g in dextrose 5%  mL (0 g intravenous Stopped 2/11/25 0105)   iohexol (OMNIPaque) 350 mg iodine/mL solution 70 mL (70 mL intravenous Given 2/11/25 0011)   metroNIDAZOLE (Flagyl)  500 mg in sodium chloride (iso)  mL (0 mg intravenous Stopped 2/11/25 0254)   cefTRIAXone (Rocephin) 2 g in sodium chloride 0.9% IV 50 mL (0 g intravenous Stopped 2/11/25 0145)   fentaNYL PF (Sublimaze) injection 25 mcg (25 mcg intravenous Given 2/11/25 0026)   HYDROmorphone (Dilaudid) injection 0.5 mg (0.5 mg intravenous Given 2/11/25 0151)   heparin bolus from bag 4,968 Units (4,968 Units intravenous Bolus from Bag 2/11/25 0227)        Final Impression:   1. Dural venous sinus thrombosis (HHS-HCC)    2. Bradycardia          Please excuse any misspellings or unintended errors related to the Dragon speech recognition software used to dictate this note.    I reviewed the case with the attending ED physician. The attending ED physician agrees with the plan.      Babak Rodriguez DO  Resident  02/11/25 0302       Babak Rodriguez DO  Resident  02/11/25 0323       Sukhwinder Walsh MD  02/12/25 3935

## 2025-02-11 NOTE — CONSULTS
infectious Disease Consult    PATIENT NAME: Shreya Garces    MRN: [id number]  SERVICE DATE:  2025   SERVICE TIME:  11:27 AM    SIGNATURE: Jaspal Armando MD    PRIMARY CARE PHYSICIAN: No Assigned PCP Generic Provider, MD  REASON FOR CONSULT: ? Meningitis   REQUESTING PHYSICIAN:         ASSESSMENT :   -Right cerebral venous thrombosis  -Leukocytosis  -Concern for meningitis  -Presented with headache and right ear pressure  -History of stroke, hypertension, hyperlipidemia, migraine      PLAN:  -Can discontinue acyclovir  -Follow-up blood culture  -Continue vancomycin and ceftriaxone empirically  -Follow-up CSF findings  -Closely monitor for antibiotics side effects including rash, Diarrhea/CDI, thrombocytopenia, ANNE, etc.      Will continue to follow.          HPI  34-year-old female who presented with chief complaints of headache and pressure sensation behind her right ear also she had fever.  CT venogram showed right extensive cerebral venous thrombosis, ID team was consulted with a concern of infectious process, currently on acyclovir, ceftriaxone, vancomycin.  Started on heparin.  Blood culture was sent.  Labs showed WBC of 17.6.    PAST MEDICAL HISTORY:   Past Medical History:   Diagnosis Date    Other conditions influencing health status     History of back problems    Personal history of other diseases of the circulatory system     History of essential hypertension    Personal history of other diseases of the nervous system and sense organs     History of migraine     PAST SURGICAL HISTORY:   Past Surgical History:   Procedure Laterality Date     SECTION, CLASSIC  2017     Section    MR HEAD ANGIO WO IV CONTRAST  2019    MR HEAD ANGIO WO IV CONTRAST 2019 [facility] ANCILLARY LEGACY     FAMILY HISTORY: No family history on file.  SOCIAL HISTORY:   Social History     Tobacco Use    Smoking status: Never    Smokeless tobacco: Never   Vaping Use    Vaping status: Every Day  "   Substances: Nicotine    Devices: Disposable   Substance Use Topics    Alcohol use: Yes     Comment: Occasionally     CURRENT ALLERGIES:   Allergies as of 02/10/2025    (No Known Allergies)     MEDICATIONS:    Current Facility-Administered Medications:     acyclovir (Zovirax) 550 mg in dextrose 5% 100 mL IV, 10 mg/kg (Ideal), intravenous, q8h, Otoniel Esposito MD, Stopped at 02/11/25 0837    cefTRIAXone (Rocephin) 2 g in sodium chloride 0.9% IV 50 mL, 2 g, intravenous, q12h, Otoniel Esposito MD    heparin 25,000 Units in dextrose 5% 250 mL (100 Units/mL) infusion (premix), 0-4,500 Units/hr, intravenous, Continuous, Babak Rodriguez DO, Last Rate: 10 mL/hr at 02/11/25 0729, 1,000 Units/hr at 02/11/25 0729    heparin bolus from bag 2,000-4,000 Units, 2,000-4,000 Units, intravenous, q4h PRN, Babak Rodriguez DO    iohexol (OMNIPaque) 350 mg iodine/mL solution 75 mL, 75 mL, intravenous, Once in imaging, Sukhwinder Walsh MD    vancomycin (Vancocin) 1,500 mg in sodium chloride 0.9%  mL, 1,500 mg, intravenous, q12h, Otoniel Esposito MD    vancomycin (Vancocin) pharmacy to dose - pharmacy monitoring, , miscellaneous, Daily PRN, Otoniel Esposito MD       COMPLETE REVIEW OF SYSTEMS:    Unable to obtain due to patient's illness    PHYSICAL EXAM:  Patient Vitals for the past 24 hrs:   BP Temp Temp src Pulse Resp SpO2 Height Weight   02/11/25 0804 (!) 148/93 36.1 °C (97 °F) Temporal 50 12 97 % -- --   02/11/25 0800 -- -- -- (!) 44 11 97 % -- --   02/11/25 0700 (!) 106/44 36.4 °C (97.5 °F) Temporal -- -- -- -- --   02/11/25 0600 123/88 36.4 °C (97.5 °F) Temporal (!) 40 15 -- -- --   02/11/25 0508 -- 36.1 °C (97 °F) Temporal (!) 44 12 95 % 1.626 m (5' 4\") 64 kg (141 lb 1.5 oz)   02/11/25 0458 -- -- -- -- -- -- 1.626 m (5' 4\") --   02/11/25 0430 140/77 -- -- (!) 43 14 97 % -- --   02/11/25 0411 (!) 163/91 -- -- (!) 41 11 98 % -- --   02/11/25 0400 (!) 183/112 -- -- (!) 22 11 98 % -- --   02/11/25 0345 -- -- -- (!) 42 12 98 % -- " "--   02/11/25 0330 136/80 -- -- (!) 43 12 97 % -- --   02/11/25 0315 -- -- -- (!) 45 11 99 % -- --   02/11/25 0300 150/81 -- -- (!) 47 17 99 % -- --   02/11/25 0245 -- -- -- (!) 40 15 98 % -- --   02/11/25 0230 (!) 167/92 36.2 °C (97.2 °F) -- (!) 37 12 98 % -- --   02/11/25 0215 151/86 -- -- (!) 43 11 97 % -- --   02/11/25 0200 -- -- -- 50 10 97 % -- --   02/11/25 0145 -- -- -- (!) 40 17 98 % -- --   02/10/25 2241 (!) 144/91 -- -- (!) 42 16 100 % -- --   02/10/25 2032 128/66 36.1 °C (97 °F) Temporal (!) 43 16 99 % 1.626 m (5' 4\") 62.1 kg (137 lb)     Body mass index is 24.22 kg/m².  Gen: NAD  Neck: symmetric, no mass  Cardiovascular: RRR  Respiratory: No distress   GI: Abd soft  Extremities: no leg edema  Skin: Warm and dry.  Neuro: alert and oriented times 3  : no cullen     Labs:  Lab Results   Component Value Date    WBC 17.6 (H) 02/11/2025    HGB 6.8 (L) 02/11/2025    HCT 25.1 (L) 02/11/2025    MCV 63 (L) 02/11/2025     (H) 02/11/2025     Lab Results   Component Value Date    GLUCOSE 138 (H) 02/11/2025    CALCIUM 8.7 02/11/2025     02/11/2025    K 4.0 02/11/2025    CO2 24 02/11/2025     02/11/2025    BUN 7 02/11/2025    CREATININE 0.43 (L) 02/11/2025   ESR: --No results found for: \"SEDRATE\"  Lab Results   Component Value Date    CRP 0.89 02/11/2025     Lab Results   Component Value Date    ALT 29 02/10/2025    AST 72 (H) 02/10/2025    ALKPHOS 110 02/10/2025    BILITOT 0.6 02/10/2025       DATA:   Diagnostic tests reviewed for today's visit:    Labs this admission reviewed  Imagings this admission reviewed  Cultures: Reviewed        Thank you so much for this consultation         Jaspal Armando MD.   Infectious Disease Attending        This note was partially created using voice recognition software and is inherently subject to errors including those of syntax and \"sound-alike\" substitutions which may escape proofreading. In such instances, original meaning may be extrapolated by contextual " derivation

## 2025-02-11 NOTE — NURSING NOTE
THE PT IS AWAKE, ORIENTED X3; SPEECH IS CLEAR; FACE IS SYMMETRICAL; TONGUE IS MIDLINE; PUPILS ARE RADHA; 2/ROUND BILAT; NO PYTOSIS; NO NYSTAGAMUS NOTED. EOMS INTACT; PT STATES BLURRING AND A HEADACHE IN THE RT EYE.    EASILY ABLE TO IDENTIFY OBJECTS AND DO ADDITION AND SUBTRACTION. THE PT APPEARS VERY PALE; DENIES ANY NAUSEA, CHESTPAIN, SOB, DIZZINESS OR PALPITATIONS. EKG IS SB WITH PAUSES AND PROLONGED QT INTERVALS. THE PTS RESPS ARE EVEN AND UNLABORED ON RA. EQUAL CHEST SYMMETRY IS NOTED WITH RESPS. DIMINISHED BS IN BLL. THE PTS ABD IS SOFT AND NON TENDER TO PALPATE. BS +; THE PT HNV. WAS BLADDER SCANNED  ML. HAS NO URGE TO VOID. THE PT VEGA X4 STRONGLY AND EQUALLY. NO DRIFTING OBSERVED. THE PT DENIES ANY N/ T X4 EXT  WELL. PULSES ARE STRONG TO PALPATE. THE PT HAS HEPARIN INFUSING AT 1100 UNITS/HR. NEXT HEPARIN ASSAY IS DUE AT [facility]. THE PTS AUNT WAS AT THE BEDSIDE. HER PHONE # IS ON THE twtMobD. SHE WOULD NOTIFY THE PTS GRANDMOTHER OF ANY CHANGES. THE PT IS REFUSING A COVID AND FLU SWABBING, WAS PLACED ON DROPLET PLUS PRECAUTIONS AT THIS TIME.  THERE ARE NO SIGNS OF DISTRESS OBSERVED. THE PTS SPEECH IS CLEAR. TONGUE IS MIDLINE. SHE IS NOTED TO HAVE SEVERAL TATOOS AND SKIN PIERCINGS. VOICING FATIGIE. ZOLL PADS ON.  0700- hep assay 0.8; HEPARIN REDUCED  UNITS/DI=9484 UNITS/HR PER THE HEPARIN PROTOCOL. TEACHING NOTIFIED OF THE PTS HORTON PAIN VOICED. IT WAS DEFERRED TO DAYSHIFT TEAM. THE PT DOES NOT APPEAR IN DISTRESS. ANXIOUS FOR BKFT. NO NEURO CHANGES OBSERVED. VSS.

## 2025-02-12 ENCOUNTER — APPOINTMENT (OUTPATIENT)
Dept: RADIOLOGY | Facility: HOSPITAL | Age: 35
DRG: 091 | End: 2025-02-12

## 2025-02-12 ENCOUNTER — APPOINTMENT (OUTPATIENT)
Dept: RADIOLOGY | Facility: HOSPITAL | Age: 35
DRG: 092 | End: 2025-02-12

## 2025-02-12 ENCOUNTER — APPOINTMENT (OUTPATIENT)
Dept: CARDIOLOGY | Facility: HOSPITAL | Age: 35
DRG: 091 | End: 2025-02-12

## 2025-02-12 ENCOUNTER — HOSPITAL ENCOUNTER (INPATIENT)
Facility: HOSPITAL | Age: 35
Discharge: ANOTHER HOSPITAL | End: 2025-02-12
Attending: PSYCHIATRY & NEUROLOGY | Admitting: PSYCHIATRY & NEUROLOGY
Payer: COMMERCIAL

## 2025-02-12 VITALS
RESPIRATION RATE: 9 BRPM | TEMPERATURE: 97.7 F | WEIGHT: 141.09 LBS | HEART RATE: 44 BPM | SYSTOLIC BLOOD PRESSURE: 164 MMHG | HEIGHT: 64 IN | DIASTOLIC BLOOD PRESSURE: 110 MMHG | BODY MASS INDEX: 24.09 KG/M2 | OXYGEN SATURATION: 95 %

## 2025-02-12 DIAGNOSIS — D50.9 IRON DEFICIENCY ANEMIA, UNSPECIFIED IRON DEFICIENCY ANEMIA TYPE: ICD-10-CM

## 2025-02-12 DIAGNOSIS — G08 DURAL VENOUS SINUS THROMBOSIS (HHS-HCC): ICD-10-CM

## 2025-02-12 DIAGNOSIS — F41.1 GAD (GENERALIZED ANXIETY DISORDER): ICD-10-CM

## 2025-02-12 DIAGNOSIS — H65.91 FLUID LEVEL BEHIND TYMPANIC MEMBRANE OF RIGHT EAR: ICD-10-CM

## 2025-02-12 DIAGNOSIS — G08 ACUTE IDIOPATHIC CVST (HHS-HCC): Primary | ICD-10-CM

## 2025-02-12 DIAGNOSIS — H65.93 FLUID LEVEL BEHIND TYMPANIC MEMBRANE OF BOTH EARS: ICD-10-CM

## 2025-02-12 LAB
ALBUMIN SERPL BCP-MCNC: 3.1 G/DL (ref 3.4–5)
ALBUMIN SERPL BCP-MCNC: 3.1 G/DL (ref 3.4–5)
ANA SER QL HEP2 SUBST: NEGATIVE
ANION GAP SERPL CALC-SCNC: 12 MMOL/L (ref 10–20)
ANION GAP SERPL CALC-SCNC: 14 MMOL/L (ref 10–20)
AORTIC VALVE MEAN GRADIENT: 5 MMHG
AORTIC VALVE PEAK VELOCITY: 1.45 M/S
APPEARANCE UR: ABNORMAL
ATRIAL RATE: 71 BPM
AV PEAK GRADIENT: 8 MMHG
AVA (PEAK VEL): 2.75 CM2
AVA (VTI): 2.59 CM2
BASOPHILS # BLD AUTO: 0.04 X10*3/UL (ref 0–0.1)
BASOPHILS NFR BLD AUTO: 0.3 %
BILIRUB UR STRIP.AUTO-MCNC: NEGATIVE MG/DL
BUN SERPL-MCNC: 6 MG/DL (ref 6–23)
BUN SERPL-MCNC: 8 MG/DL (ref 6–23)
CALCIUM SERPL-MCNC: 8.2 MG/DL (ref 8.6–10.3)
CALCIUM SERPL-MCNC: 8.8 MG/DL (ref 8.6–10.6)
CARDIOLIPIN IGA SERPL-ACNC: 1.7 APL U/ML
CARDIOLIPIN IGG SER IA-ACNC: <1.6 GPL U/ML
CARDIOLIPIN IGM SER IA-ACNC: 0.7 MPL U/ML
CHLORIDE SERPL-SCNC: 103 MMOL/L (ref 98–107)
CHLORIDE SERPL-SCNC: 104 MMOL/L (ref 98–107)
CO2 SERPL-SCNC: 26 MMOL/L (ref 21–32)
CO2 SERPL-SCNC: 29 MMOL/L (ref 21–32)
COLOR UR: ABNORMAL
CREAT SERPL-MCNC: 0.43 MG/DL (ref 0.5–1.05)
CREAT SERPL-MCNC: 0.47 MG/DL (ref 0.5–1.05)
CYTOPLASMIC PATTERN: PRESENT
D DIMER PPP FEU-MCNC: 883 NG/ML FEU
DAT-POLYSPECIFIC: NORMAL
EGFRCR SERPLBLD CKD-EPI 2021: >90 ML/MIN/1.73M*2
EGFRCR SERPLBLD CKD-EPI 2021: >90 ML/MIN/1.73M*2
EJECTION FRACTION APICAL 4 CHAMBER: 62.7
EJECTION FRACTION: 64 %
EOSINOPHIL # BLD AUTO: 0.01 X10*3/UL (ref 0–0.7)
EOSINOPHIL NFR BLD AUTO: 0.1 %
ERYTHROCYTE [DISTWIDTH] IN BLOOD BY AUTOMATED COUNT: 24.1 % (ref 11.5–14.5)
ERYTHROCYTE [DISTWIDTH] IN BLOOD BY AUTOMATED COUNT: 24.3 % (ref 11.5–14.5)
ERYTHROCYTE [DISTWIDTH] IN BLOOD BY AUTOMATED COUNT: 24.5 % (ref 11.5–14.5)
GLUCOSE SERPL-MCNC: 94 MG/DL (ref 74–99)
GLUCOSE SERPL-MCNC: 96 MG/DL (ref 74–99)
GLUCOSE UR STRIP.AUTO-MCNC: NORMAL MG/DL
HCT VFR BLD AUTO: 25.8 % (ref 36–46)
HCT VFR BLD AUTO: 26.9 % (ref 36–46)
HCT VFR BLD AUTO: 29.8 % (ref 36–46)
HGB BLD-MCNC: 7.1 G/DL (ref 12–16)
HGB BLD-MCNC: 7.6 G/DL (ref 12–16)
HGB BLD-MCNC: 8 G/DL (ref 12–16)
HGB RETIC QN: 19 PG (ref 28–38)
HIV 1+2 AB+HIV1 P24 AG SERPL QL IA: NONREACTIVE
HYPOCHROMIA BLD QL SMEAR: NORMAL
IMM GRANULOCYTES # BLD AUTO: 0.11 X10*3/UL (ref 0–0.7)
IMM GRANULOCYTES NFR BLD AUTO: 0.7 % (ref 0–0.9)
IMMATURE RETIC FRACTION: 35.6 %
KETONES UR STRIP.AUTO-MCNC: NEGATIVE MG/DL
LEFT ATRIUM VOLUME AREA LENGTH INDEX BSA: 32.8 ML/M2
LEFT VENTRICLE INTERNAL DIMENSION DIASTOLE: 4.7 CM (ref 3.5–6)
LEFT VENTRICULAR OUTFLOW TRACT DIAMETER: 2.2 CM
LEUKOCYTE ESTERASE UR QL STRIP.AUTO: NEGATIVE
LV EJECTION FRACTION BIPLANE: 64 %
LYMPHOCYTES # BLD AUTO: 2.45 X10*3/UL (ref 1.2–4.8)
LYMPHOCYTES NFR BLD AUTO: 16.5 %
MAGNESIUM SERPL-MCNC: 1.8 MG/DL (ref 1.6–2.4)
MCH RBC QN AUTO: 17.8 PG (ref 26–34)
MCH RBC QN AUTO: 18.1 PG (ref 26–34)
MCH RBC QN AUTO: 18.2 PG (ref 26–34)
MCHC RBC AUTO-ENTMCNC: 26.8 G/DL (ref 32–36)
MCHC RBC AUTO-ENTMCNC: 27.5 G/DL (ref 32–36)
MCHC RBC AUTO-ENTMCNC: 28.3 G/DL (ref 32–36)
MCV RBC AUTO: 64 FL (ref 80–100)
MCV RBC AUTO: 65 FL (ref 80–100)
MCV RBC AUTO: 67 FL (ref 80–100)
MITRAL VALVE E/A RATIO: 2.53
MONOCYTES # BLD AUTO: 1.03 X10*3/UL (ref 0.1–1)
MONOCYTES NFR BLD AUTO: 6.9 %
MUCOUS THREADS #/AREA URNS AUTO: ABNORMAL /LPF
NEUTROPHILS # BLD AUTO: 11.23 X10*3/UL (ref 1.2–7.7)
NEUTROPHILS NFR BLD AUTO: 75.5 %
NITRITE UR QL STRIP.AUTO: NEGATIVE
NRBC BLD-RTO: 0 /100 WBCS (ref 0–0)
NRBC BLD-RTO: 0 /100 WBCS (ref 0–0)
NRBC BLD-RTO: 0.1 /100 WBCS (ref 0–0)
OVALOCYTES BLD QL SMEAR: NORMAL
P AXIS: 85 DEGREES
P OFFSET: 186 MS
P ONSET: 140 MS
PATH REVIEW-CBC DIFFERENTIAL: NORMAL
PH UR STRIP.AUTO: 6 [PH]
PHOSPHATE SERPL-MCNC: 3.4 MG/DL (ref 2.5–4.9)
PHOSPHATE SERPL-MCNC: 3.9 MG/DL (ref 2.5–4.9)
PLATELET # BLD AUTO: 358 X10*3/UL (ref 150–450)
PLATELET # BLD AUTO: 538 X10*3/UL (ref 150–450)
PLATELET # BLD AUTO: 540 X10*3/UL (ref 150–450)
POTASSIUM SERPL-SCNC: 3.6 MMOL/L (ref 3.5–5.3)
POTASSIUM SERPL-SCNC: 3.9 MMOL/L (ref 3.5–5.3)
PR INTERVAL: 152 MS
PROT UR STRIP.AUTO-MCNC: ABNORMAL MG/DL
Q ONSET: 216 MS
QRS COUNT: 8 BEATS
QRS DURATION: 90 MS
QT INTERVAL: 524 MS
QTC CALCULATION(BAZETT): 473 MS
QTC FREDERICIA: 489 MS
R AXIS: 66 DEGREES
RBC # BLD AUTO: 3.98 X10*6/UL (ref 4–5.2)
RBC # BLD AUTO: 4.18 X10*6/UL (ref 4–5.2)
RBC # BLD AUTO: 4.43 X10*6/UL (ref 4–5.2)
RBC # UR STRIP.AUTO: NEGATIVE MG/DL
RBC #/AREA URNS AUTO: ABNORMAL /HPF
RBC MORPH BLD: NORMAL
RETICS #: 0.05 X10*6/UL (ref 0.02–0.08)
RETICS/RBC NFR AUTO: 1.4 % (ref 0.5–2)
RIGHT VENTRICLE FREE WALL PEAK S': 11.7 CM/S
RIGHT VENTRICLE PEAK SYSTOLIC PRESSURE: 24 MMHG
SODIUM SERPL-SCNC: 140 MMOL/L (ref 136–145)
SODIUM SERPL-SCNC: 140 MMOL/L (ref 136–145)
SP GR UR STRIP.AUTO: 1.05
SQUAMOUS #/AREA URNS AUTO: ABNORMAL /HPF
T AXIS: 72 DEGREES
T OFFSET: 478 MS
TARGETS BLD QL SMEAR: NORMAL
TRICUSPID ANNULAR PLANE SYSTOLIC EXCURSION: 2.9 CM
UFH PPP CHRO-ACNC: 0.3 IU/ML
UFH PPP CHRO-ACNC: 0.4 IU/ML
UROBILINOGEN UR STRIP.AUTO-MCNC: NORMAL MG/DL
VANCOMYCIN SERPL-MCNC: 14.4 UG/ML (ref 5–20)
VENTRICULAR RATE: 49 BPM
WBC # BLD AUTO: 13.1 X10*3/UL (ref 4.4–11.3)
WBC # BLD AUTO: 14.9 X10*3/UL (ref 4.4–11.3)
WBC # BLD AUTO: 15.7 X10*3/UL (ref 4.4–11.3)
WBC #/AREA URNS AUTO: ABNORMAL /HPF

## 2025-02-12 PROCEDURE — 99223 1ST HOSP IP/OBS HIGH 75: CPT

## 2025-02-12 PROCEDURE — 81001 URINALYSIS AUTO W/SCOPE: CPT | Performed by: STUDENT IN AN ORGANIZED HEALTH CARE EDUCATION/TRAINING PROGRAM

## 2025-02-12 PROCEDURE — 82525 ASSAY OF COPPER: CPT | Performed by: STUDENT IN AN ORGANIZED HEALTH CARE EDUCATION/TRAINING PROGRAM

## 2025-02-12 PROCEDURE — 86038 ANTINUCLEAR ANTIBODIES: CPT | Mod: STJLAB | Performed by: SPECIALIST

## 2025-02-12 PROCEDURE — 85027 COMPLETE CBC AUTOMATED: CPT

## 2025-02-12 PROCEDURE — 36415 COLL VENOUS BLD VENIPUNCTURE: CPT | Performed by: STUDENT IN AN ORGANIZED HEALTH CARE EDUCATION/TRAINING PROGRAM

## 2025-02-12 PROCEDURE — 84630 ASSAY OF ZINC: CPT | Performed by: STUDENT IN AN ORGANIZED HEALTH CARE EDUCATION/TRAINING PROGRAM

## 2025-02-12 PROCEDURE — 2020000001 HC ICU ROOM DAILY

## 2025-02-12 PROCEDURE — 2500000001 HC RX 250 WO HCPCS SELF ADMINISTERED DRUGS (ALT 637 FOR MEDICARE OP)

## 2025-02-12 PROCEDURE — 86036 ANCA SCREEN EACH ANTIBODY: CPT | Performed by: SPECIALIST

## 2025-02-12 PROCEDURE — 2500000001 HC RX 250 WO HCPCS SELF ADMINISTERED DRUGS (ALT 637 FOR MEDICARE OP): Performed by: STUDENT IN AN ORGANIZED HEALTH CARE EDUCATION/TRAINING PROGRAM

## 2025-02-12 PROCEDURE — 85520 HEPARIN ASSAY: CPT

## 2025-02-12 PROCEDURE — 2500000004 HC RX 250 GENERAL PHARMACY W/ HCPCS (ALT 636 FOR OP/ED): Performed by: EMERGENCY MEDICINE

## 2025-02-12 PROCEDURE — 2500000004 HC RX 250 GENERAL PHARMACY W/ HCPCS (ALT 636 FOR OP/ED)

## 2025-02-12 PROCEDURE — 86800 THYROGLOBULIN ANTIBODY: CPT | Performed by: SPECIALIST

## 2025-02-12 PROCEDURE — 85027 COMPLETE CBC AUTOMATED: CPT | Performed by: STUDENT IN AN ORGANIZED HEALTH CARE EDUCATION/TRAINING PROGRAM

## 2025-02-12 PROCEDURE — 86880 COOMBS TEST DIRECT: CPT

## 2025-02-12 PROCEDURE — 2500000005 HC RX 250 GENERAL PHARMACY W/O HCPCS

## 2025-02-12 PROCEDURE — 83735 ASSAY OF MAGNESIUM: CPT

## 2025-02-12 PROCEDURE — 85045 AUTOMATED RETICULOCYTE COUNT: CPT | Performed by: STUDENT IN AN ORGANIZED HEALTH CARE EDUCATION/TRAINING PROGRAM

## 2025-02-12 PROCEDURE — 80202 ASSAY OF VANCOMYCIN: CPT

## 2025-02-12 PROCEDURE — 70450 CT HEAD/BRAIN W/O DYE: CPT

## 2025-02-12 PROCEDURE — 85025 COMPLETE CBC W/AUTO DIFF WBC: CPT

## 2025-02-12 PROCEDURE — 36415 COLL VENOUS BLD VENIPUNCTURE: CPT

## 2025-02-12 PROCEDURE — 2500000004 HC RX 250 GENERAL PHARMACY W/ HCPCS (ALT 636 FOR OP/ED): Performed by: STUDENT IN AN ORGANIZED HEALTH CARE EDUCATION/TRAINING PROGRAM

## 2025-02-12 PROCEDURE — 99239 HOSP IP/OBS DSCHRG MGMT >30: CPT

## 2025-02-12 PROCEDURE — 99223 1ST HOSP IP/OBS HIGH 75: CPT | Performed by: INTERNAL MEDICINE

## 2025-02-12 PROCEDURE — 80069 RENAL FUNCTION PANEL: CPT

## 2025-02-12 PROCEDURE — 85520 HEPARIN ASSAY: CPT | Performed by: STUDENT IN AN ORGANIZED HEALTH CARE EDUCATION/TRAINING PROGRAM

## 2025-02-12 PROCEDURE — 93005 ELECTROCARDIOGRAM TRACING: CPT

## 2025-02-12 PROCEDURE — 86147 CARDIOLIPIN ANTIBODY EA IG: CPT | Mod: STJLAB | Performed by: SPECIALIST

## 2025-02-12 PROCEDURE — 99291 CRITICAL CARE FIRST HOUR: CPT

## 2025-02-12 PROCEDURE — 99233 SBSQ HOSP IP/OBS HIGH 50: CPT | Performed by: SPECIALIST

## 2025-02-12 PROCEDURE — 85379 FIBRIN DEGRADATION QUANT: CPT | Performed by: SPECIALIST

## 2025-02-12 RX ORDER — LABETALOL HYDROCHLORIDE 5 MG/ML
10 INJECTION, SOLUTION INTRAVENOUS EVERY 10 MIN PRN
Status: DISCONTINUED | OUTPATIENT
Start: 2025-02-12 | End: 2025-02-12

## 2025-02-12 RX ORDER — ACETAMINOPHEN 500 MG
5 TABLET ORAL NIGHTLY
Status: DISCONTINUED | OUTPATIENT
Start: 2025-02-12 | End: 2025-02-17 | Stop reason: HOSPADM

## 2025-02-12 RX ORDER — ACETAMINOPHEN 500 MG
5 TABLET ORAL ONCE
Status: CANCELLED | OUTPATIENT
Start: 2025-02-12 | End: 2025-02-12

## 2025-02-12 RX ORDER — ACETAMINOPHEN 325 MG/1
650 TABLET ORAL EVERY 6 HOURS PRN
Status: CANCELLED | OUTPATIENT
Start: 2025-02-12

## 2025-02-12 RX ORDER — HEPARIN SODIUM 10000 [USP'U]/100ML
0-4500 INJECTION, SOLUTION INTRAVENOUS CONTINUOUS
Status: DISCONTINUED | OUTPATIENT
Start: 2025-02-12 | End: 2025-02-12

## 2025-02-12 RX ORDER — ONDANSETRON HYDROCHLORIDE 2 MG/ML
4 INJECTION, SOLUTION INTRAVENOUS EVERY 4 HOURS PRN
Status: CANCELLED | OUTPATIENT
Start: 2025-02-12

## 2025-02-12 RX ORDER — SODIUM CHLORIDE, SODIUM LACTATE, POTASSIUM CHLORIDE, CALCIUM CHLORIDE 600; 310; 30; 20 MG/100ML; MG/100ML; MG/100ML; MG/100ML
125 INJECTION, SOLUTION INTRAVENOUS CONTINUOUS
Status: DISCONTINUED | OUTPATIENT
Start: 2025-02-12 | End: 2025-02-12 | Stop reason: HOSPADM

## 2025-02-12 RX ORDER — POTASSIUM CHLORIDE 1.5 G/1.58G
40 POWDER, FOR SOLUTION ORAL ONCE
Status: DISCONTINUED | OUTPATIENT
Start: 2025-02-12 | End: 2025-02-12

## 2025-02-12 RX ORDER — VANCOMYCIN HYDROCHLORIDE 1 G/20ML
INJECTION, POWDER, LYOPHILIZED, FOR SOLUTION INTRAVENOUS DAILY PRN
Status: CANCELLED | OUTPATIENT
Start: 2025-02-12

## 2025-02-12 RX ORDER — FOLIC ACID 1 MG/1
1 TABLET ORAL DAILY
Status: DISCONTINUED | OUTPATIENT
Start: 2025-02-12 | End: 2025-02-12

## 2025-02-12 RX ORDER — MAGNESIUM SULFATE HEPTAHYDRATE 40 MG/ML
2 INJECTION, SOLUTION INTRAVENOUS ONCE
Status: COMPLETED | OUTPATIENT
Start: 2025-02-12 | End: 2025-02-13

## 2025-02-12 RX ORDER — HYDRALAZINE HYDROCHLORIDE 20 MG/ML
10 INJECTION INTRAMUSCULAR; INTRAVENOUS
Status: DISCONTINUED | OUTPATIENT
Start: 2025-02-12 | End: 2025-02-14

## 2025-02-12 RX ORDER — HYDROMORPHONE HYDROCHLORIDE 1 MG/ML
0.2 INJECTION, SOLUTION INTRAMUSCULAR; INTRAVENOUS; SUBCUTANEOUS EVERY 4 HOURS PRN
Status: DISCONTINUED | OUTPATIENT
Start: 2025-02-12 | End: 2025-02-13

## 2025-02-12 RX ORDER — TROPICAMIDE 10 MG/ML
1 SOLUTION/ DROPS OPHTHALMIC ONCE
Status: COMPLETED | OUTPATIENT
Start: 2025-02-12 | End: 2025-02-12

## 2025-02-12 RX ORDER — SODIUM CHLORIDE 9 MG/ML
75 INJECTION, SOLUTION INTRAVENOUS CONTINUOUS
Status: ACTIVE | OUTPATIENT
Start: 2025-02-12 | End: 2025-02-13

## 2025-02-12 RX ORDER — VANCOMYCIN 1.75 GRAM/500 ML IN 0.9 % SODIUM CHLORIDE INTRAVENOUS
1750 EVERY 12 HOURS
Status: DISCONTINUED | OUTPATIENT
Start: 2025-02-12 | End: 2025-02-12 | Stop reason: HOSPADM

## 2025-02-12 RX ORDER — HYDROMORPHONE HYDROCHLORIDE 1 MG/ML
0.5 INJECTION, SOLUTION INTRAMUSCULAR; INTRAVENOUS; SUBCUTANEOUS EVERY 4 HOURS PRN
Status: DISCONTINUED | OUTPATIENT
Start: 2025-02-12 | End: 2025-02-12

## 2025-02-12 RX ORDER — POLYETHYLENE GLYCOL 3350 17 G/17G
17 POWDER, FOR SOLUTION ORAL DAILY
Status: DISCONTINUED | OUTPATIENT
Start: 2025-02-12 | End: 2025-02-17 | Stop reason: HOSPADM

## 2025-02-12 RX ORDER — HEPARIN SODIUM 10000 [USP'U]/100ML
0-4500 INJECTION, SOLUTION INTRAVENOUS CONTINUOUS
Status: CANCELLED | OUTPATIENT
Start: 2025-02-12

## 2025-02-12 RX ORDER — ACETAMINOPHEN 325 MG/1
975 TABLET ORAL ONCE
Status: DISCONTINUED | OUTPATIENT
Start: 2025-02-12 | End: 2025-02-12

## 2025-02-12 RX ORDER — VANCOMYCIN HYDROCHLORIDE 1 G/20ML
INJECTION, POWDER, LYOPHILIZED, FOR SOLUTION INTRAVENOUS DAILY PRN
Status: DISCONTINUED | OUTPATIENT
Start: 2025-02-12 | End: 2025-02-14

## 2025-02-12 RX ORDER — THIAMINE HYDROCHLORIDE 100 MG/ML
500 INJECTION, SOLUTION INTRAMUSCULAR; INTRAVENOUS 3 TIMES DAILY
Status: DISCONTINUED | OUTPATIENT
Start: 2025-02-12 | End: 2025-02-14

## 2025-02-12 RX ORDER — CEFTRIAXONE 2 G/50ML
2 INJECTION, SOLUTION INTRAVENOUS EVERY 12 HOURS
Status: CANCELLED | OUTPATIENT
Start: 2025-02-12

## 2025-02-12 RX ORDER — ACETAMINOPHEN 500 MG
5 TABLET ORAL ONCE
Status: COMPLETED | OUTPATIENT
Start: 2025-02-12 | End: 2025-02-12

## 2025-02-12 RX ORDER — CEFTRIAXONE 2 G/50ML
2 INJECTION, SOLUTION INTRAVENOUS EVERY 24 HOURS
Status: DISCONTINUED | OUTPATIENT
Start: 2025-02-12 | End: 2025-02-12

## 2025-02-12 RX ORDER — VANCOMYCIN HYDROCHLORIDE 1 G/20ML
INJECTION, POWDER, LYOPHILIZED, FOR SOLUTION INTRAVENOUS DAILY PRN
Status: DISCONTINUED | OUTPATIENT
Start: 2025-02-12 | End: 2025-02-12

## 2025-02-12 RX ORDER — ACETAMINOPHEN 325 MG/1
650 TABLET ORAL EVERY 4 HOURS PRN
Status: DISCONTINUED | OUTPATIENT
Start: 2025-02-12 | End: 2025-02-12

## 2025-02-12 RX ORDER — ONDANSETRON 4 MG/1
4 TABLET, FILM COATED ORAL EVERY 8 HOURS PRN
Status: DISCONTINUED | OUTPATIENT
Start: 2025-02-12 | End: 2025-02-17 | Stop reason: HOSPADM

## 2025-02-12 RX ORDER — HYDROXYZINE HYDROCHLORIDE 25 MG/1
25 TABLET, FILM COATED ORAL ONCE
Status: DISCONTINUED | OUTPATIENT
Start: 2025-02-12 | End: 2025-02-12

## 2025-02-12 RX ORDER — ACETAMINOPHEN 325 MG/1
975 TABLET ORAL ONCE
Status: CANCELLED | OUTPATIENT
Start: 2025-02-12 | End: 2025-02-12

## 2025-02-12 RX ORDER — HEPARIN SODIUM 10000 [USP'U]/100ML
0-4000 INJECTION, SOLUTION INTRAVENOUS CONTINUOUS
Status: DISCONTINUED | OUTPATIENT
Start: 2025-02-12 | End: 2025-02-13

## 2025-02-12 RX ORDER — PHENYLEPHRINE HYDROCHLORIDE 25 MG/ML
1 SOLUTION/ DROPS OPHTHALMIC ONCE
Status: COMPLETED | OUTPATIENT
Start: 2025-02-12 | End: 2025-02-12

## 2025-02-12 RX ORDER — HYDROXYZINE HYDROCHLORIDE 25 MG/1
25 TABLET, FILM COATED ORAL ONCE
Status: CANCELLED | OUTPATIENT
Start: 2025-02-12 | End: 2025-02-12

## 2025-02-12 RX ORDER — CEFTRIAXONE 2 G/50ML
2 INJECTION, SOLUTION INTRAVENOUS EVERY 12 HOURS
Status: DISCONTINUED | OUTPATIENT
Start: 2025-02-12 | End: 2025-02-14

## 2025-02-12 RX ORDER — HYDROXYZINE HYDROCHLORIDE 25 MG/1
25 TABLET, FILM COATED ORAL ONCE
Status: COMPLETED | OUTPATIENT
Start: 2025-02-12 | End: 2025-02-12

## 2025-02-12 RX ORDER — POTASSIUM CHLORIDE 1.5 G/1.58G
40 POWDER, FOR SOLUTION ORAL ONCE
Status: DISCONTINUED | OUTPATIENT
Start: 2025-02-12 | End: 2025-02-12 | Stop reason: HOSPADM

## 2025-02-12 RX ORDER — ONDANSETRON HYDROCHLORIDE 2 MG/ML
4 INJECTION, SOLUTION INTRAVENOUS EVERY 4 HOURS PRN
Status: DISCONTINUED | OUTPATIENT
Start: 2025-02-12 | End: 2025-02-12

## 2025-02-12 RX ORDER — FOLIC ACID 1 MG/1
1 TABLET ORAL DAILY
Status: CANCELLED | OUTPATIENT
Start: 2025-02-13

## 2025-02-12 RX ORDER — FOLIC ACID 1 MG/1
1 TABLET ORAL DAILY
Status: DISCONTINUED | OUTPATIENT
Start: 2025-02-13 | End: 2025-02-17 | Stop reason: HOSPADM

## 2025-02-12 RX ORDER — ACETAMINOPHEN 500 MG
5 TABLET ORAL ONCE
Status: DISCONTINUED | OUTPATIENT
Start: 2025-02-12 | End: 2025-02-12

## 2025-02-12 RX ORDER — POTASSIUM CHLORIDE 1.5 G/1.58G
40 POWDER, FOR SOLUTION ORAL ONCE
Status: CANCELLED | OUTPATIENT
Start: 2025-02-12 | End: 2025-02-12

## 2025-02-12 RX ORDER — VANCOMYCIN 1.75 GRAM/500 ML IN 0.9 % SODIUM CHLORIDE INTRAVENOUS
1750 EVERY 12 HOURS
Status: CANCELLED | OUTPATIENT
Start: 2025-02-12

## 2025-02-12 RX ORDER — AMOXICILLIN 250 MG
2 CAPSULE ORAL 2 TIMES DAILY
Status: DISCONTINUED | OUTPATIENT
Start: 2025-02-12 | End: 2025-02-17 | Stop reason: HOSPADM

## 2025-02-12 RX ORDER — ACETAMINOPHEN 325 MG/1
650 TABLET ORAL EVERY 6 HOURS PRN
Status: DISCONTINUED | OUTPATIENT
Start: 2025-02-12 | End: 2025-02-15

## 2025-02-12 RX ORDER — ONDANSETRON HYDROCHLORIDE 2 MG/ML
4 INJECTION, SOLUTION INTRAVENOUS EVERY 8 HOURS PRN
Status: DISCONTINUED | OUTPATIENT
Start: 2025-02-12 | End: 2025-02-17 | Stop reason: HOSPADM

## 2025-02-12 RX ORDER — ACETAZOLAMIDE 250 MG/1
500 TABLET ORAL 2 TIMES DAILY
Status: DISCONTINUED | OUTPATIENT
Start: 2025-02-13 | End: 2025-02-17 | Stop reason: HOSPADM

## 2025-02-12 RX ORDER — HYDRALAZINE HYDROCHLORIDE 20 MG/ML
10 INJECTION INTRAMUSCULAR; INTRAVENOUS
Status: DISCONTINUED | OUTPATIENT
Start: 2025-02-12 | End: 2025-02-12

## 2025-02-12 RX ORDER — LORAZEPAM 2 MG/ML
1 INJECTION INTRAMUSCULAR ONCE
Status: COMPLETED | OUTPATIENT
Start: 2025-02-12 | End: 2025-02-12

## 2025-02-12 RX ORDER — LABETALOL HYDROCHLORIDE 5 MG/ML
10 INJECTION, SOLUTION INTRAVENOUS EVERY 10 MIN PRN
Status: DISCONTINUED | OUTPATIENT
Start: 2025-02-12 | End: 2025-02-14

## 2025-02-12 RX ADMIN — VANCOMYCIN HYDROCHLORIDE 1500 MG: 1.5 INJECTION, POWDER, LYOPHILIZED, FOR SOLUTION INTRAVENOUS at 03:47

## 2025-02-12 RX ADMIN — MAGNESIUM SULFATE HEPTAHYDRATE 2 G: 2 INJECTION, SOLUTION INTRAVENOUS at 23:01

## 2025-02-12 RX ADMIN — SODIUM CHLORIDE 75 ML/HR: 9 INJECTION, SOLUTION INTRAVENOUS at 21:17

## 2025-02-12 RX ADMIN — FOLIC ACID 1 MG: 1 TABLET ORAL at 11:48

## 2025-02-12 RX ADMIN — HEPARIN SODIUM 1000 UNITS/HR: 10000 INJECTION, SOLUTION INTRAVENOUS at 22:31

## 2025-02-12 RX ADMIN — CEFTRIAXONE 2 G: 2 INJECTION, POWDER, FOR SOLUTION INTRAMUSCULAR; INTRAVENOUS at 02:36

## 2025-02-12 RX ADMIN — HYDROMORPHONE HYDROCHLORIDE 0.5 MG: 1 INJECTION, SOLUTION INTRAMUSCULAR; INTRAVENOUS; SUBCUTANEOUS at 07:27

## 2025-02-12 RX ADMIN — Medication 1750 MG: at 16:36

## 2025-02-12 RX ADMIN — Medication 5 MG: at 21:23

## 2025-02-12 RX ADMIN — HYDROMORPHONE HYDROCHLORIDE 0.5 MG: 1 INJECTION, SOLUTION INTRAMUSCULAR; INTRAVENOUS; SUBCUTANEOUS at 15:46

## 2025-02-12 RX ADMIN — IRON SUCROSE 300 MG: 20 INJECTION, SOLUTION INTRAVENOUS at 05:36

## 2025-02-12 RX ADMIN — ACETAMINOPHEN 650 MG: 325 TABLET ORAL at 03:27

## 2025-02-12 RX ADMIN — HYDROMORPHONE HYDROCHLORIDE 0.5 MG: 1 INJECTION, SOLUTION INTRAMUSCULAR; INTRAVENOUS; SUBCUTANEOUS at 01:41

## 2025-02-12 RX ADMIN — Medication 5 MG: at 00:40

## 2025-02-12 RX ADMIN — THIAMINE HYDROCHLORIDE 500 MG: 100 INJECTION, SOLUTION INTRAMUSCULAR; INTRAVENOUS at 23:43

## 2025-02-12 RX ADMIN — TROPICAMIDE 1 DROP: 10 SOLUTION/ DROPS OPHTHALMIC at 22:09

## 2025-02-12 RX ADMIN — HYDROXYZINE HYDROCHLORIDE 25 MG: 25 TABLET, FILM COATED ORAL at 00:40

## 2025-02-12 RX ADMIN — CEFTRIAXONE SODIUM 2 G: 2 INJECTION, SOLUTION INTRAVENOUS at 21:22

## 2025-02-12 RX ADMIN — LORAZEPAM 1 MG: 2 INJECTION INTRAMUSCULAR; INTRAVENOUS at 16:59

## 2025-02-12 RX ADMIN — HYDROMORPHONE HYDROCHLORIDE 0.5 MG: 1 INJECTION, SOLUTION INTRAMUSCULAR; INTRAVENOUS; SUBCUTANEOUS at 11:37

## 2025-02-12 RX ADMIN — HYDROMORPHONE HYDROCHLORIDE 0.2 MG: 1 INJECTION, SOLUTION INTRAMUSCULAR; INTRAVENOUS; SUBCUTANEOUS at 19:56

## 2025-02-12 RX ADMIN — PHENYLEPHRINE HYDROCHLORIDE 1 DROP: 25 SOLUTION/ DROPS OPHTHALMIC at 21:26

## 2025-02-12 RX ADMIN — SODIUM CHLORIDE, POTASSIUM CHLORIDE, SODIUM LACTATE AND CALCIUM CHLORIDE 125 ML/HR: 600; 310; 30; 20 INJECTION, SOLUTION INTRAVENOUS at 16:02

## 2025-02-12 RX ADMIN — HEPARIN SODIUM 1000 UNITS/HR: 10000 INJECTION, SOLUTION INTRAVENOUS at 16:19

## 2025-02-12 ASSESSMENT — PAIN DESCRIPTION - DESCRIPTORS
DESCRIPTORS: HEADACHE

## 2025-02-12 ASSESSMENT — PAIN SCALES - GENERAL
PAINLEVEL_OUTOF10: 4
PAINLEVEL_OUTOF10: 0 - NO PAIN
PAINLEVEL_OUTOF10: 7
PAINLEVEL_OUTOF10: 6
PAINLEVEL_OUTOF10: 9
PAINLEVEL_OUTOF10: 0 - NO PAIN
PAINLEVEL_OUTOF10: 10 - WORST POSSIBLE PAIN
PAINLEVEL_OUTOF10: 7
PAINLEVEL_OUTOF10: 2
PAINLEVEL_OUTOF10: 3
PAINLEVEL_OUTOF10: 0 - NO PAIN
PAINLEVEL_OUTOF10: 10 - WORST POSSIBLE PAIN
PAINLEVEL_OUTOF10: 3

## 2025-02-12 ASSESSMENT — PAIN - FUNCTIONAL ASSESSMENT
PAIN_FUNCTIONAL_ASSESSMENT: 0-10

## 2025-02-12 ASSESSMENT — PAIN DESCRIPTION - LOCATION
LOCATION: HEAD
LOCATION: HEAD

## 2025-02-12 NOTE — CONSULTS
Consults    Reason For Consult  Dural venous sinus thrombosis, microcytic anemia    Primary care provider: Julianne Khan DO ([facility])  Referring provider:  Ayo Chen DO    History Of Present Illness  Shreya Garces is a 34 y.o. female w/ history of lacunar stroke secondary to uncontrolled HTN, migraines headaches, and gastric bypass who presented to [facility] ED with severe right seided headache and blurry vision in the setting of recent upper respiratory illness. She was found to have extensive posterior dural venous sinus thrombosis on CT scan. She was also found to be profoundly anemic requiring transfusion and venofer. She is currently on broad spectrum antibiotics due to concern for meningitis along with heparin gtt. She was scheduled to get LP today but is refusing this due to concerns about pain. Pt is also profoundly bradycardiac with concerns for Cushing's reflex. On my exam, she is uncooperative and reporting worsening headache.      Past Medical History  She has a past medical history of Other conditions influencing health status, Personal history of other diseases of the circulatory system, and Personal history of other diseases of the nervous system and sense organs.  Hyperlipidemia  Hypertension  Migraines  Cervical dysplasia  PCOS  Pre-eclampsia    Surgical History  She has a past surgical history that includes  section, classic (2017) and MR angio head wo IV contrast (2019).  Gastric bypass     Social History  She reports that she has never smoked. She has never used smokeless tobacco. She reports current alcohol use.  Drug: Marijuana.    Family History  No family history of malignancy or hematologic disorder     Allergies  Atorvastatin and Penicillins    Review of Systems  Review of Systems   Constitutional:  Negative for fever.   HENT: Negative.     Eyes:  Positive for visual disturbance. Negative for photophobia, pain, discharge, redness and itching.   Respiratory:  Positive for  cough. Negative for shortness of breath and wheezing.    Cardiovascular:  Negative for chest pain, palpitations and leg swelling.   Gastrointestinal:  Negative for nausea and vomiting.   Endocrine: Negative.    Genitourinary: Negative.    Musculoskeletal: Negative.    Skin: Negative.    Allergic/Immunologic: Negative.    Neurological:  Positive for dizziness and headaches.   Hematological: Negative.    Psychiatric/Behavioral: Negative.            Physical Exam  Physical Exam  Vitals and nursing note reviewed.   Constitutional:       General: She is not in acute distress.     Appearance: Normal appearance. She is not ill-appearing or toxic-appearing.   HENT:      Head: Normocephalic and atraumatic.      Right Ear: External ear normal.      Left Ear: External ear normal.      Nose: Nose normal.      Mouth/Throat:      Mouth: Mucous membranes are moist.   Eyes:      Extraocular Movements: Extraocular movements intact.   Cardiovascular:      Rate and Rhythm: Normal rate and regular rhythm.   Pulmonary:      Effort: Pulmonary effort is normal. No respiratory distress.      Breath sounds: Normal breath sounds. No stridor. No wheezing or rhonchi.   Abdominal:      General: Abdomen is flat.      Tenderness: There is no abdominal tenderness. There is no guarding or rebound.   Musculoskeletal:         General: Normal range of motion.   Skin:     General: Skin is warm and dry.      Coloration: Skin is pale.      Findings: No rash.   Neurological:      General: No focal deficit present.      Mental Status: She is alert and oriented to person, place, and time.   Psychiatric:         Mood and Affect: Mood normal.         Behavior: Behavior normal.              Last Recorded Vitals  BP (!) 139/114   Pulse (!) 38   Temp 36.5 °C (97.7 °F)   Resp (!) 9   Wt 64 kg (141 lb 1.5 oz)   SpO2 96%     Relevant Results  Results for orders placed or performed during the hospital encounter of 02/10/25 (from the past 24 hours)   B-type  Natriuretic Peptide   Result Value Ref Range     (H) 0 - 99 pg/mL   CBC and Auto Differential   Result Value Ref Range    WBC 14.9 (H) 4.4 - 11.3 x10*3/uL    nRBC 0.0 0.0 - 0.0 /100 WBCs    RBC 3.98 (L) 4.00 - 5.20 x10*6/uL    Hemoglobin 7.1 (L) 12.0 - 16.0 g/dL    Hematocrit 25.8 (L) 36.0 - 46.0 %    MCV 65 (L) 80 - 100 fL    MCH 17.8 (L) 26.0 - 34.0 pg    MCHC 27.5 (L) 32.0 - 36.0 g/dL    RDW 24.1 (H) 11.5 - 14.5 %    Platelets 538 (H) 150 - 450 x10*3/uL    Neutrophils % 75.5 40.0 - 80.0 %    Immature Granulocytes %, Automated 0.7 0.0 - 0.9 %    Lymphocytes % 16.5 13.0 - 44.0 %    Monocytes % 6.9 2.0 - 10.0 %    Eosinophils % 0.1 0.0 - 6.0 %    Basophils % 0.3 0.0 - 2.0 %    Neutrophils Absolute 11.23 (H) 1.20 - 7.70 x10*3/uL    Immature Granulocytes Absolute, Automated 0.11 0.00 - 0.70 x10*3/uL    Lymphocytes Absolute 2.45 1.20 - 4.80 x10*3/uL    Monocytes Absolute 1.03 (H) 0.10 - 1.00 x10*3/uL    Eosinophils Absolute 0.01 0.00 - 0.70 x10*3/uL    Basophils Absolute 0.04 0.00 - 0.10 x10*3/uL   Renal function panel   Result Value Ref Range    Glucose 96 74 - 99 mg/dL    Sodium 140 136 - 145 mmol/L    Potassium 3.6 3.5 - 5.3 mmol/L    Chloride 104 98 - 107 mmol/L    Bicarbonate 26 21 - 32 mmol/L    Anion Gap 14 10 - 20 mmol/L    Urea Nitrogen 8 6 - 23 mg/dL    Creatinine 0.47 (L) 0.50 - 1.05 mg/dL    eGFR >90 >60 mL/min/1.73m*2    Calcium 8.2 (L) 8.6 - 10.3 mg/dL    Phosphorus 3.4 2.5 - 4.9 mg/dL    Albumin 3.1 (L) 3.4 - 5.0 g/dL   Heparin Assay   Result Value Ref Range    Heparin Unfractionated 0.4 See Comment Below for Therapeutic Ranges IU/mL   Morphology   Result Value Ref Range    RBC Morphology See Below     Hypochromia Marked     Target Cells Few     Ovalocytes Few    Reticulocytes   Result Value Ref Range    Retic % 1.4 0.5 - 2.0 %    Retic Absolute 0.054 0.018 - 0.083 x10*6/uL    Reticulocyte Hemoglobin 19 (L) 28 - 38 pg    Immature Retic fraction 35.6 (H) <=16.0 %   Pathologist Review-CBC  Differential   Result Value Ref Range    Pathologist Review-CBC Differential       Microcytic anemia, mild leukocytosis and mild thrombocytosis present.  Rare ambiguous and fragmented red blood cells noted. Polychromatophilic reticulocytes are also noted.  No rouleaux formation, blasts, dysplastic cells or nucleated red blood cells are seen.  Please correlate clinically.   D-dimer, VTE Exclusion   Result Value Ref Range    D-Dimer, Quantitative VTE Exclusion 883 (H) <=500 ng/mL FEU   Cardiolipin antibody   Result Value Ref Range    Anticardiolipin IgA 1.7 <20.0 APL U/mL    Anticardiolipin IgG <1.6 <20.0 GPL U/mL    Anticardiolipin IgM 0.7 <20.0 MPL U/mL   Vancomycin   Result Value Ref Range    Vancomycin 14.4 5.0 - 20.0 ug/mL   Direct Antiglobulin Test   Result Value Ref Range    DINAH-POLYSPECIFIC NEG    CBC   Result Value Ref Range    WBC 13.1 (H) 4.4 - 11.3 x10*3/uL    nRBC 0.0 0.0 - 0.0 /100 WBCs    RBC 4.43 4.00 - 5.20 x10*6/uL    Hemoglobin 8.0 (L) 12.0 - 16.0 g/dL    Hematocrit 29.8 (L) 36.0 - 46.0 %    MCV 67 (L) 80 - 100 fL    MCH 18.1 (L) 26.0 - 34.0 pg    MCHC 26.8 (L) 32.0 - 36.0 g/dL    RDW 24.5 (H) 11.5 - 14.5 %    Platelets 358 150 - 450 x10*3/uL     MR venography intracranial w and wo IV contrast    Result Date: 2/11/2025  Interpreted By:  Kodak Castrejon, STUDY: MR VENOGRAPHY INTRACRANIAL W AND WO IV CONTRAST;  2/11/2025 7:11 pm   INDICATION: Signs/Symptoms:Central venous thrombosis.     COMPARISON: MRI brain today and CTV today.   ACCESSION NUMBER(S): [id number]   ORDERING CLINICIAN: KEHINDE RAY   TECHNIQUE: 2-D time-of-flight intracranial MRV and subsequent 2-D and 3-D reconstructions by the technologist. Additional axial volumetric T1 postcontrast acquisition.   FINDINGS: Again demonstrated is extensive thrombosis posterior half of the superior sagittal sinus, straight sinus, vein of Conor, right sigmoid and transverse sinus into the right internal jugular vein, and proximal left transverse  sinus.       Again demonstrated is extensive thrombosis posterior half of the superior sagittal sinus, straight sinus, vein of Conor, right sigmoid and transverse sinus into the right internal jugular vein, and proximal left transverse sinus.   MACRO: None   Signed by: Kodak Castrejon 2/11/2025 7:26 PM Dictation workstation:   [id number]    MR brain w and wo IV contrast    Result Date: 2/11/2025  Interpreted By:  Kodak Castrejon, STUDY: MR BRAIN W AND WO IV CONTRAST;  2/11/2025 7:11 pm   INDICATION: Signs/Symptoms:Cerebral venous thrombosis Please assess for cavernou sinus thrombosis or strokes.     COMPARISON: CTV earlier today.   ACCESSION NUMBER(S): [id number]   ORDERING CLINICIAN: APOLINAR ALEX   TECHNIQUE: T2, FLAIR, DWI, ADC, gradient echo T2, and T1 weighted images of brain were acquired without intravenous contrast administration. Multi planar T1 weighted imaging was acquired after administration of 13 ML Dotarem gadolinium based intravenous contrast.   FINDINGS: No abnormal brain parenchymal or meningeal enhancement. No intracranial mass lesions.  No destructive osseous lesions.   No acute intracranial hemorrhage. No extra-axial fluid collection. No abnormally restricted diffusion to suggest recent infarction.   Nonspecific white matter FLAIR signal increase most likely related to mild chronic small vessel ischemic change.   Moderate paranasal sinus/ethmoid mucosal inflammatory changes. Right-sided nonspecific mastoid fluid. Orbital contents unremarkable.       1.  No acute intracranial abnormality. Unremarkable brain.     MACRO: None   Signed by: Kodak Castrejon 2/11/2025 7:24 PM Dictation workstation:   [id number]    XR chest 2 views    Result Date: 2/11/2025  Interpreted By:  Roxanna Diego, STUDY: XR CHEST 2 VIEWS;  2/11/2025 10:16 am   INDICATION: Signs/Symptoms:prior left retrocardiac opacity.   COMPARISON: 09/11/2011   ACCESSION NUMBER(S): [id number]   ORDERING CLINICIAN: LEONIDAS MARTINO    FINDINGS: Heart is normal in size.   There is a suggestion of some patchiness at the right lung base. There is no obvious pleural fluid.   The mediastinum and bones are unremarkable.   COMPARISON OF FINDING: The increased markings at the left base were not obvious previously.       Question of left basilar infiltrate or atelectasis.   MACRO: none   Signed by: Roxanna Diego 2/11/2025 10:22 AM Dictation workstation:   [id number]    CT head wo IV contrast    Result Date: 2/11/2025  Interpreted By:  Savannah Tipton, STUDY: CT HEAD WO IV CONTRAST; CT VENOGRAM HEAD W AND WO IV CONTRAST AND POST PROCEDURE;  2/10/2025 11:28 pm; 2/11/2025 12:22 am   INDICATION: Signs/Symptoms:headache, subjective visual change; Signs/Symptoms:Concern for right transverse.   COMPARISON: None.   ACCESSION NUMBER(S): [id number]; [id number]   ORDERING CLINICIAN: BETTE JARA   TECHNIQUE: Axial noncontrast CT images of the head. Contrast-enhanced CT venogram.   FINDINGS: BRAIN PARENCHYMA: On the unenhanced imaging there is hyperdense attenuation within the posterosuperior sagittal sinus as well as the straight sinus, confluence and predominantly the right transverse sinus extending into the right sigmoid sinus. Findings are consistent with acute dural sinus thrombosis.   Gray-white matter interfaces are preserved. No mass effect or midline shift.   HEMORRHAGE: No acute intracranial hemorrhage. VENTRICLES and EXTRA-AXIAL SPACES: Normal size. EXTRACRANIAL SOFT TISSUES: Within normal limits. PARANASAL SINUSES/MASTOIDS: Mucosal thickening of the right maxillary sinus. Fluid throughout the left maxillary sinus, left ethmoid air cells on the left frontal sinus. CALVARIUM: No depressed skull fracture. No destructive osseous lesion.   Filling defect following contrast administration noted in the posterior superior sagittal sinus extending into the confluence. There is no significant contrast opacification within the straight sinus, the right  transverse sinus or the right sigmoid sinus. The left transverse sinus is slightly diminutive which could be developmental versus related to partial filling defects. The internal cerebral veins and vein of Conor appears patent.   OTHER FINDINGS: None.       Hyperdense thrombus on unenhanced imaging with corresponding filling defects on CT venogram noted within the posterior superior sagittal sinus, the straight sinus, the right transverse sinus and the right sigmoid sinus consistent with extensive posterior dural venous thrombosis. Diminutive appearance of the left transverse sinus which may be developmental.   No evidence of hemorrhagic infarct.   MACRO: Savannah Tipton discussed the significance and urgency of this critical finding by telephone with Babak Elliott on 2/11/2025 at 1:31 am.  (**-RCF-**) Findings:  See findings.   Signed by: Savannah Tipton 2/11/2025 1:37 AM Dictation workstation:   [id number]    CT venogram head w and wo iv contrast    Result Date: 2/11/2025  Interpreted By:  Savannah Tipton, STUDY: CT HEAD WO IV CONTRAST; CT VENOGRAM HEAD W AND WO IV CONTRAST AND POST PROCEDURE;  2/10/2025 11:28 pm; 2/11/2025 12:22 am   INDICATION: Signs/Symptoms:headache, subjective visual change; Signs/Symptoms:Concern for right transverse.   COMPARISON: None.   ACCESSION NUMBER(S): [id number]; [id number]   ORDERING CLINICIAN: BETTE JARA   TECHNIQUE: Axial noncontrast CT images of the head. Contrast-enhanced CT venogram.   FINDINGS: BRAIN PARENCHYMA: On the unenhanced imaging there is hyperdense attenuation within the posterosuperior sagittal sinus as well as the straight sinus, confluence and predominantly the right transverse sinus extending into the right sigmoid sinus. Findings are consistent with acute dural sinus thrombosis.   Gray-white matter interfaces are preserved. No mass effect or midline shift.   HEMORRHAGE: No acute intracranial hemorrhage. VENTRICLES and EXTRA-AXIAL SPACES: Normal size.  EXTRACRANIAL SOFT TISSUES: Within normal limits. PARANASAL SINUSES/MASTOIDS: Mucosal thickening of the right maxillary sinus. Fluid throughout the left maxillary sinus, left ethmoid air cells on the left frontal sinus. CALVARIUM: No depressed skull fracture. No destructive osseous lesion.   Filling defect following contrast administration noted in the posterior superior sagittal sinus extending into the confluence. There is no significant contrast opacification within the straight sinus, the right transverse sinus or the right sigmoid sinus. The left transverse sinus is slightly diminutive which could be developmental versus related to partial filling defects. The internal cerebral veins and vein of Conor appears patent.   OTHER FINDINGS: None.       Hyperdense thrombus on unenhanced imaging with corresponding filling defects on CT venogram noted within the posterior superior sagittal sinus, the straight sinus, the right transverse sinus and the right sigmoid sinus consistent with extensive posterior dural venous thrombosis. Diminutive appearance of the left transverse sinus which may be developmental.   No evidence of hemorrhagic infarct.   MACRO: Savannah Tipton discussed the significance and urgency of this critical finding by telephone with Babak Elliott on 2/11/2025 at 1:31 am.  (**-RCF-**) Findings:  See findings.   Signed by: Savannah Tipton 2/11/2025 1:37 AM Dictation workstation:   [id number]        Assessment/Plan     #Venous sinus thrombosis   #Acute on chronic microcytic anemia   - Agree with continued anticoagulation   - Active thrombosis and anticoagulation will disrupt thrombophilia labs   - Agree with Venofer   - Pt to be transferred to [facility] [place]     Discussed with heme/onc attending physician Dr. Mcgraw.       Rashi Oshea, DO  PGY-3, FM     ATTENDING NOTE  1) dural sinus thrombosis  -has history of prior lacunar stroke (was on ASA daily which was discontinued after her bariatric surgery)  -MR/MV done on  2/11/2025 showed no acute intracranial abnormality, unremarkable brain, there is extensive thrombosis posterior half of superior sagittal sinus, straight sinus, vein of Conor, right sigmoid and transverse sinus into the right internal jugular vein and proximal left transverse sinus  -neuro-onc telehealth advised thrombophlia workup, although this is generally not done at time of acute event-there may be distortion of results and results do not impact management--she needs full dose anticoagulation  -currently on heparin drip  -[place] [facility] [place]    2) anemia  -s/p Alf-en-Y gastrojejunostomy 4/16/2021  -secondary to iron deficiency  -receiving IV venofer    3) thrombocytosis  -this is reactive secondary to profound iron deficiency  -reactive thrombocytosis is NOT associated with increased risk for thrombosis    4) headaches  -secondary to CVT  -meningitis cannot be ruled out  -on IV ceftriaxone  -on vancomycin    5) hyperlipidemia  -on crestor at home  -on praluent at home    6) hypertension  -on metoprolol at home  -on losartan at home    7) obstructive sleep apnea  -on CPAP (PCP documented noncompliance)    I spent 80 minutes with the overall and professional care of this patient

## 2025-02-12 NOTE — SIGNIFICANT EVENT
"Capacity Assessment Tool    \"Capacity\" is the \"ability\" to make a decision.  The decision in question must be specific (one decision), relevant to a patient's current condition (appropriate), and timely (neither prospective nor retrospective).    Capacity varies based on knowledge base (explanation/understanding of clinical information), cognitive processing, acute psychiatric illness, and other clinical conditions.    In order to be deemed \"capacitated\" to make a single decision at one point in time, a patient must demonstrate all 4 of the following elements:    *Ability to consistently communicate a choice (consistent over time with adequate information)  *Ability to understand the relevant information (accurate knowledge of condition)  *Ability to appreciate the situation and its consequences (risks/benefits, pros/cons)  *Ability to reason about treatment options (without undue influence of a person or condition, eg. suicidality or acute psychosis)      Current Decision    Clinical issue:   Due to brain pathology, increased ICP and cushings reflex need transfer to neuro critical care at [facility] [place]. She cannot repeat information told to her about necessity of transfer, she cannot repeat reasons for transfer, she cannot repeat the consequences of non-treatment including death or permanent neurological injury.     Did the appropriate team address relevant information with the patient:  Yes    Date: 2/12/25    If \"NO\" is selected for appropriate team, then please discuss with the appropriate team.  The appropriate team should be encouraged to address relevant information with the patient AND reevaluate capacity when appropriate.    Capacity Evaluation    Patient demonstrates ability to consistently communicate choice:  No     Patient demonstrates ability to understand the relevant information:  No     Patient demonstrates ability to appreciate the situation and its consequences:  No     Patient demonstrates ability to " "reason about treatment options:  No     If ANY of the above items are answered \"NO,\" the patient LACKS CAPACITY for that specific decision at hand, at that specific time.  Further capacity evaluations can be done as needed.  "

## 2025-02-12 NOTE — PROGRESS NOTES
Shreya Garces is a 34 y.o. female on day 1 of admission presenting with Dural venous sinus thrombosis ([facility]).      Subjective     -Patient received hydroxyzine and melatonin for anxiety and insomnia last night.  Received Tylenol and Dilaudid for headache overnight.    -This morning, patient reports continued headache which feels like an intermittent pressure behind her right eye.  Patient denies CP or SOB.      Objective     Last Recorded Vitals  /87 (BP Location: Left arm, Patient Position: Lying)   Pulse 56   Temp 36.4 °C (97.5 °F) (Temporal)   Resp 14   Wt 64 kg (141 lb 1.5 oz)   SpO2 99%   Intake/Output last 3 Shifts:    Intake/Output Summary (Last 24 hours) at 2/12/2025 1034  Last data filed at 2/11/2025 1541  Gross per 24 hour   Intake 348.33 ml   Output 700 ml   Net -351.67 ml       Admission Weight  Weight: 62.1 kg (137 lb) (02/10/25 2032)    Daily Weight  02/11/25 : 64 kg (141 lb 1.5 oz)    Image Results  MR venography intracranial w and wo IV contrast  Narrative: Interpreted By:  Kodak Castrejon,   STUDY:  MR VENOGRAPHY INTRACRANIAL W AND WO IV CONTRAST;  2/11/2025 7:11 pm      INDICATION:  Signs/Symptoms:Central venous thrombosis.          COMPARISON:  MRI brain today and CTV today.      ACCESSION NUMBER(S):  [id number]      ORDERING CLINICIAN:  KEHINDE RAY      TECHNIQUE:  2-D time-of-flight intracranial MRV and subsequent 2-D and 3-D  reconstructions by the technologist. Additional axial volumetric T1  postcontrast acquisition.      FINDINGS:  Again demonstrated is extensive thrombosis posterior half of the  superior sagittal sinus, straight sinus, vein of Conor, right sigmoid  and transverse sinus into the right internal jugular vein, and  proximal left transverse sinus.      Impression: Again demonstrated is extensive thrombosis posterior half of the  superior sagittal sinus, straight sinus, vein of Conor, right sigmoid  and transverse sinus into the right internal jugular  vein, and  proximal left transverse sinus.      MACRO:  None      Signed by: Kodak Castrejon 2/11/2025 7:26 PM  Dictation workstation:   [id number]  MR brain w and wo IV contrast  Narrative: Interpreted By:  Kodak Castrejon,   STUDY:  MR BRAIN W AND WO IV CONTRAST;  2/11/2025 7:11 pm      INDICATION:  Signs/Symptoms:Cerebral venous thrombosis Please assess for cavernou  sinus thrombosis or strokes.          COMPARISON:  CTV earlier today.      ACCESSION NUMBER(S):  [id number]      ORDERING CLINICIAN:  APOLINAR ALEX      TECHNIQUE:  T2, FLAIR, DWI, ADC, gradient echo T2, and T1 weighted images of  brain were acquired without intravenous contrast administration.  Multi planar T1 weighted imaging was acquired after administration of  13 ML Dotarem gadolinium based intravenous contrast.      FINDINGS:  No abnormal brain parenchymal or meningeal enhancement. No  intracranial mass lesions.  No destructive osseous lesions.      No acute intracranial hemorrhage. No extra-axial fluid collection. No  abnormally restricted diffusion to suggest recent infarction.      Nonspecific white matter FLAIR signal increase most likely related to  mild chronic small vessel ischemic change.      Moderate paranasal sinus/ethmoid mucosal inflammatory changes.  Right-sided nonspecific mastoid fluid. Orbital contents unremarkable.      Impression: 1.  No acute intracranial abnormality. Unremarkable brain.          MACRO:  None      Signed by: Kodak Castrejon 2/11/2025 7:24 PM  Dictation workstation:   [id number]  XR chest 2 views  Narrative: Interpreted By:  Roxanna Diego,   STUDY:  XR CHEST 2 VIEWS;  2/11/2025 10:16 am      INDICATION:  Signs/Symptoms:prior left retrocardiac opacity.      COMPARISON:  09/11/2011      ACCESSION NUMBER(S):  [id number]      ORDERING CLINICIAN:  LEONIDAS MARTINO      FINDINGS:  Heart is normal in size.      There is a suggestion of some patchiness at the right lung base.  There is no obvious pleural fluid.       The mediastinum and bones are unremarkable.      COMPARISON OF FINDING:  The increased markings at the left base were not obvious previously.      Impression: Question of left basilar infiltrate or atelectasis.      MACRO:  none      Signed by: Roxanna Diego 2/11/2025 10:22 AM  Dictation workstation:   [id number]  CT head wo IV contrast, CT venogram head w and wo iv contrast  Narrative: Interpreted By:  Savannah Tipton,   STUDY:  CT HEAD WO IV CONTRAST; CT VENOGRAM HEAD W AND WO IV CONTRAST AND  POST PROCEDURE;  2/10/2025 11:28 pm; 2/11/2025 12:22 am      INDICATION:  Signs/Symptoms:headache, subjective visual change;  Signs/Symptoms:Concern for right transverse.      COMPARISON:  None.      ACCESSION NUMBER(S):  [id number]; [id number]      ORDERING CLINICIAN:  BETTE JARA      TECHNIQUE:  Axial noncontrast CT images of the head. Contrast-enhanced CT  venogram.      FINDINGS:  BRAIN PARENCHYMA: On the unenhanced imaging there is hyperdense  attenuation within the posterosuperior sagittal sinus as well as the  straight sinus, confluence and predominantly the right transverse  sinus extending into the right sigmoid sinus. Findings are consistent  with acute dural sinus thrombosis.      Gray-white matter interfaces are preserved. No mass effect or midline  shift.      HEMORRHAGE: No acute intracranial hemorrhage.  VENTRICLES and EXTRA-AXIAL SPACES: Normal size.  EXTRACRANIAL SOFT TISSUES: Within normal limits.  PARANASAL SINUSES/MASTOIDS: Mucosal thickening of the right maxillary  sinus. Fluid throughout the left maxillary sinus, left ethmoid air  cells on the left frontal sinus. CALVARIUM: No depressed skull  fracture. No destructive osseous lesion.      Filling defect following contrast administration noted in the  posterior superior sagittal sinus extending into the confluence.  There is no significant contrast opacification within the straight  sinus, the right transverse sinus or the right sigmoid sinus.  The  left transverse sinus is slightly diminutive which could be  developmental versus related to partial filling defects. The internal  cerebral veins and vein of Conor appears patent.      OTHER FINDINGS: None.      Impression: Hyperdense thrombus on unenhanced imaging with corresponding filling  defects on CT venogram noted within the posterior superior sagittal  sinus, the straight sinus, the right transverse sinus and the right  sigmoid sinus consistent with extensive posterior dural venous  thrombosis. Diminutive appearance of the left transverse sinus which  may be developmental.      No evidence of hemorrhagic infarct.      MACRO:  Savannah Tipton discussed the significance and urgency of this  critical finding by telephone with Babak Elliott on 2/11/2025 at 1:31  am.  (**-RCF-**) Findings:  See findings.      Signed by: Savannah Tipton 2/11/2025 1:37 AM  Dictation workstation:   [id number]      Physical Exam  Constitutional:       General: She is not in acute distress.  HENT:      Head: Normocephalic and atraumatic.   Cardiovascular:      Rate and Rhythm: Bradycardia present.   Pulmonary:      Effort: Pulmonary effort is normal. No respiratory distress.      Breath sounds: No wheezing.   Abdominal:      General: Abdomen is flat. There is no distension.      Palpations: Abdomen is soft.      Tenderness: There is no abdominal tenderness. There is no guarding or rebound.   Musculoskeletal:      Right lower leg: No edema.      Left lower leg: No edema.   Skin:     General: Skin is warm and dry.   Neurological:      General: No focal deficit present.      Cranial Nerves: No cranial nerve deficit.      Sensory: No sensory deficit.      Motor: No weakness.      Coordination: Coordination normal.   Psychiatric:         Mood and Affect: Mood normal.         Behavior: Behavior normal.         Relevant Results    Scheduled medications  cefTRIAXone, 2 g, intravenous, q12h  folic acid, 1 mg, oral, Daily  iohexol, 75 mL,  intravenous, Once in imaging  iron sucrose, 300 mg, intravenous, Daily  vancomycin, 1,500 mg, intravenous, q12h      Continuous medications  heparin, 0-4,500 Units/hr, Last Rate: 1,000 Units/hr (02/12/25 0557)      PRN medications  PRN medications: acetaminophen, heparin, HYDROmorphone, ondansetron, vancomycin    Results for orders placed or performed during the hospital encounter of 02/10/25 (from the past 24 hours)   Prepare RBC: 1 Units   Result Value Ref Range    PRODUCT CODE V1521Q96     Unit Number [id number]     Unit ABO O     Unit RH POS     XM INTEP COMP     Dispense Status XM     Blood Expiration Date 3/6/2025 11:59:00 PM EST     PRODUCT BLOOD TYPE 5100     UNIT VOLUME 350    Heparin Assay   Result Value Ref Range    Heparin Unfractionated 0.5 See Comment Below for Therapeutic Ranges IU/mL   PST Top   Result Value Ref Range    Extra Tube Hold for add-ons.    Troponin I, High Sensitivity   Result Value Ref Range    Troponin I, High Sensitivity 4 0 - 13 ng/L   Transthoracic Echo (TTE) Complete   Result Value Ref Range    BSA 1.7 m2   Heparin Assay   Result Value Ref Range    Heparin Unfractionated 0.4 See Comment Below for Therapeutic Ranges IU/mL   HIV 1/2 Antigen/Antibody Screen with Reflex to Confirmation   Result Value Ref Range    HIV 1/2 Antigen/Antibody Screen with Reflex to Confirmation Nonreactive Nonreactive   B-type Natriuretic Peptide   Result Value Ref Range     (H) 0 - 99 pg/mL   CBC and Auto Differential   Result Value Ref Range    WBC 14.9 (H) 4.4 - 11.3 x10*3/uL    nRBC 0.0 0.0 - 0.0 /100 WBCs    RBC 3.98 (L) 4.00 - 5.20 x10*6/uL    Hemoglobin 7.1 (L) 12.0 - 16.0 g/dL    Hematocrit 25.8 (L) 36.0 - 46.0 %    MCV 65 (L) 80 - 100 fL    MCH 17.8 (L) 26.0 - 34.0 pg    MCHC 27.5 (L) 32.0 - 36.0 g/dL    RDW 24.1 (H) 11.5 - 14.5 %    Platelets 538 (H) 150 - 450 x10*3/uL    Neutrophils % 75.5 40.0 - 80.0 %    Immature Granulocytes %, Automated 0.7 0.0 - 0.9 %    Lymphocytes % 16.5 13.0 -  44.0 %    Monocytes % 6.9 2.0 - 10.0 %    Eosinophils % 0.1 0.0 - 6.0 %    Basophils % 0.3 0.0 - 2.0 %    Neutrophils Absolute 11.23 (H) 1.20 - 7.70 x10*3/uL    Immature Granulocytes Absolute, Automated 0.11 0.00 - 0.70 x10*3/uL    Lymphocytes Absolute 2.45 1.20 - 4.80 x10*3/uL    Monocytes Absolute 1.03 (H) 0.10 - 1.00 x10*3/uL    Eosinophils Absolute 0.01 0.00 - 0.70 x10*3/uL    Basophils Absolute 0.04 0.00 - 0.10 x10*3/uL   Renal function panel   Result Value Ref Range    Glucose 96 74 - 99 mg/dL    Sodium 140 136 - 145 mmol/L    Potassium 3.6 3.5 - 5.3 mmol/L    Chloride 104 98 - 107 mmol/L    Bicarbonate 26 21 - 32 mmol/L    Anion Gap 14 10 - 20 mmol/L    Urea Nitrogen 8 6 - 23 mg/dL    Creatinine 0.47 (L) 0.50 - 1.05 mg/dL    eGFR >90 >60 mL/min/1.73m*2    Calcium 8.2 (L) 8.6 - 10.3 mg/dL    Phosphorus 3.4 2.5 - 4.9 mg/dL    Albumin 3.1 (L) 3.4 - 5.0 g/dL   Heparin Assay   Result Value Ref Range    Heparin Unfractionated 0.4 See Comment Below for Therapeutic Ranges IU/mL   Morphology   Result Value Ref Range    RBC Morphology See Below     Hypochromia Marked     Target Cells Few     Ovalocytes Few    Reticulocytes   Result Value Ref Range    Retic % 1.4 0.5 - 2.0 %    Retic Absolute 0.054 0.018 - 0.083 x10*6/uL    Reticulocyte Hemoglobin 19 (L) 28 - 38 pg    Immature Retic fraction 35.6 (H) <=16.0 %   Pathologist Review-CBC Differential   Result Value Ref Range    Pathologist Review-CBC Differential       Microcytic anemia, mild leukocytosis and mild thrombocytosis present.  Rare ambiguous and fragmented red blood cells noted. Polychromatophilic reticulocytes are also noted.  No rouleaux formation, blasts, dysplastic cells or nucleated red blood cells are seen.  Please correlate clinically.   D-dimer, VTE Exclusion   Result Value Ref Range    D-Dimer, Quantitative VTE Exclusion 883 (H) <=500 ng/mL FEU       MR venography intracranial w and wo IV contrast    Result Date: 2/11/2025  Interpreted By:  Cash  Kodak, STUDY: MR VENOGRAPHY INTRACRANIAL W AND WO IV CONTRAST;  2/11/2025 7:11 pm   INDICATION: Signs/Symptoms:Central venous thrombosis.     COMPARISON: MRI brain today and CTV today.   ACCESSION NUMBER(S): [id number]   ORDERING CLINICIAN: KEHINDE RAY   TECHNIQUE: 2-D time-of-flight intracranial MRV and subsequent 2-D and 3-D reconstructions by the technologist. Additional axial volumetric T1 postcontrast acquisition.   FINDINGS: Again demonstrated is extensive thrombosis posterior half of the superior sagittal sinus, straight sinus, vein of Conor, right sigmoid and transverse sinus into the right internal jugular vein, and proximal left transverse sinus.       Again demonstrated is extensive thrombosis posterior half of the superior sagittal sinus, straight sinus, vein of Conor, right sigmoid and transverse sinus into the right internal jugular vein, and proximal left transverse sinus.   MACRO: None   Signed by: Kodak Castrejon 2/11/2025 7:26 PM Dictation workstation:   [id number]    MR brain w and wo IV contrast    Result Date: 2/11/2025  Interpreted By:  Kodak Castrejon, STUDY: MR BRAIN W AND WO IV CONTRAST;  2/11/2025 7:11 pm   INDICATION: Signs/Symptoms:Cerebral venous thrombosis Please assess for cavernou sinus thrombosis or strokes.     COMPARISON: CTV earlier today.   ACCESSION NUMBER(S): [id number]   ORDERING CLINICIAN: APOLINAR ALEX   TECHNIQUE: T2, FLAIR, DWI, ADC, gradient echo T2, and T1 weighted images of brain were acquired without intravenous contrast administration. Multi planar T1 weighted imaging was acquired after administration of 13 ML Dotarem gadolinium based intravenous contrast.   FINDINGS: No abnormal brain parenchymal or meningeal enhancement. No intracranial mass lesions.  No destructive osseous lesions.   No acute intracranial hemorrhage. No extra-axial fluid collection. No abnormally restricted diffusion to suggest recent infarction.   Nonspecific white matter FLAIR signal  increase most likely related to mild chronic small vessel ischemic change.   Moderate paranasal sinus/ethmoid mucosal inflammatory changes. Right-sided nonspecific mastoid fluid. Orbital contents unremarkable.       1.  No acute intracranial abnormality. Unremarkable brain.     MACRO: None   Signed by: Kodak Castrejon 2/11/2025 7:24 PM Dictation workstation:   [id number]    XR chest 2 views    Result Date: 2/11/2025  Interpreted By:  Roxanna Diego, STUDY: XR CHEST 2 VIEWS;  2/11/2025 10:16 am   INDICATION: Signs/Symptoms:prior left retrocardiac opacity.   COMPARISON: 09/11/2011   ACCESSION NUMBER(S): [id number]   ORDERING CLINICIAN: LEONIDAS MARTINO   FINDINGS: Heart is normal in size.   There is a suggestion of some patchiness at the right lung base. There is no obvious pleural fluid.   The mediastinum and bones are unremarkable.   COMPARISON OF FINDING: The increased markings at the left base were not obvious previously.       Question of left basilar infiltrate or atelectasis.   MACRO: none   Signed by: Roxanna Diego 2/11/2025 10:22 AM Dictation workstation:   [id number]    CT head wo IV contrast    Result Date: 2/11/2025  Interpreted By:  Savannah Tipton, STUDY: CT HEAD WO IV CONTRAST; CT VENOGRAM HEAD W AND WO IV CONTRAST AND POST PROCEDURE;  2/10/2025 11:28 pm; 2/11/2025 12:22 am   INDICATION: Signs/Symptoms:headache, subjective visual change; Signs/Symptoms:Concern for right transverse.   COMPARISON: None.   ACCESSION NUMBER(S): [id number]; [id number]   ORDERING CLINICIAN: BETTE JARA   TECHNIQUE: Axial noncontrast CT images of the head. Contrast-enhanced CT venogram.   FINDINGS: BRAIN PARENCHYMA: On the unenhanced imaging there is hyperdense attenuation within the posterosuperior sagittal sinus as well as the straight sinus, confluence and predominantly the right transverse sinus extending into the right sigmoid sinus. Findings are consistent with acute dural sinus thrombosis.   Gray-white matter  interfaces are preserved. No mass effect or midline shift.   HEMORRHAGE: No acute intracranial hemorrhage. VENTRICLES and EXTRA-AXIAL SPACES: Normal size. EXTRACRANIAL SOFT TISSUES: Within normal limits. PARANASAL SINUSES/MASTOIDS: Mucosal thickening of the right maxillary sinus. Fluid throughout the left maxillary sinus, left ethmoid air cells on the left frontal sinus. CALVARIUM: No depressed skull fracture. No destructive osseous lesion.   Filling defect following contrast administration noted in the posterior superior sagittal sinus extending into the confluence. There is no significant contrast opacification within the straight sinus, the right transverse sinus or the right sigmoid sinus. The left transverse sinus is slightly diminutive which could be developmental versus related to partial filling defects. The internal cerebral veins and vein of Conor appears patent.   OTHER FINDINGS: None.       Hyperdense thrombus on unenhanced imaging with corresponding filling defects on CT venogram noted within the posterior superior sagittal sinus, the straight sinus, the right transverse sinus and the right sigmoid sinus consistent with extensive posterior dural venous thrombosis. Diminutive appearance of the left transverse sinus which may be developmental.   No evidence of hemorrhagic infarct.   MACRO: Savannah Tipton discussed the significance and urgency of this critical finding by telephone with Babak Elliott on 2/11/2025 at 1:31 am.  (**-RCF-**) Findings:  See findings.   Signed by: Savannah Tipton 2/11/2025 1:37 AM Dictation workstation:   [id number]    CT venogram head w and wo iv contrast    Result Date: 2/11/2025  Interpreted By:  Savannah Tipton, STUDY: CT HEAD WO IV CONTRAST; CT VENOGRAM HEAD W AND WO IV CONTRAST AND POST PROCEDURE;  2/10/2025 11:28 pm; 2/11/2025 12:22 am   INDICATION: Signs/Symptoms:headache, subjective visual change; Signs/Symptoms:Concern for right transverse.   COMPARISON: None.    ACCESSION NUMBER(S): [id number]; [id number]   ORDERING CLINICIAN: BETTE JARA   TECHNIQUE: Axial noncontrast CT images of the head. Contrast-enhanced CT venogram.   FINDINGS: BRAIN PARENCHYMA: On the unenhanced imaging there is hyperdense attenuation within the posterosuperior sagittal sinus as well as the straight sinus, confluence and predominantly the right transverse sinus extending into the right sigmoid sinus. Findings are consistent with acute dural sinus thrombosis.   Gray-white matter interfaces are preserved. No mass effect or midline shift.   HEMORRHAGE: No acute intracranial hemorrhage. VENTRICLES and EXTRA-AXIAL SPACES: Normal size. EXTRACRANIAL SOFT TISSUES: Within normal limits. PARANASAL SINUSES/MASTOIDS: Mucosal thickening of the right maxillary sinus. Fluid throughout the left maxillary sinus, left ethmoid air cells on the left frontal sinus. CALVARIUM: No depressed skull fracture. No destructive osseous lesion.   Filling defect following contrast administration noted in the posterior superior sagittal sinus extending into the confluence. There is no significant contrast opacification within the straight sinus, the right transverse sinus or the right sigmoid sinus. The left transverse sinus is slightly diminutive which could be developmental versus related to partial filling defects. The internal cerebral veins and vein of Conor appears patent.   OTHER FINDINGS: None.       Hyperdense thrombus on unenhanced imaging with corresponding filling defects on CT venogram noted within the posterior superior sagittal sinus, the straight sinus, the right transverse sinus and the right sigmoid sinus consistent with extensive posterior dural venous thrombosis. Diminutive appearance of the left transverse sinus which may be developmental.   No evidence of hemorrhagic infarct.   MACRO: Savannah Tipton discussed the significance and urgency of this critical finding by telephone with Babak Elliott on 2/11/2025  at 1:31 am.  (**-RCF-**) Findings:  See findings.   Signed by: Savannah Tipton 2/11/2025 1:37 AM Dictation workstation:   [id number]    XR chest 2 views    Result Date: 1/31/2025  * * *Final Report* * * DATE OF EXAM: Jan 31 2025  6:55PM   VHX   5291  -  XR CHEST 2V FRONTAL/LAT  / ACCESSION #  [id number] PROCEDURE REASON: Cough      * * * * Physician Interpretation * * * *  EXAMINATION:  CHEST RADIOGRAPH (2 VIEW FRONTAL & LATERAL) CLINICAL HISTORY: Cough MQ:  XC2_6 EXAM DATE/TIME:  1/31/2025 6:55 PM COMPARISON:  No relevant prior studies available. RESULT: Lines, tubes, and devices:  None. Lungs and pleura:  Concern for left retrocardiac airspace opacity Cardiomediastinal silhouette:  Normal cardiomediastinal silhouette. Bones and soft tissues:  Unremarkable.    IMPRESSION: Concern for left retrocardiac infiltrate. Mild distention with air of large bowel : SYLVIA   Transcribe Date/Time: Jan 31 2025  7:12P Dictated by : JANIS DE LA ROSA MD This examination was interpreted and the report reviewed and electronically signed by: JANIS DE LA ROSA MD on Jan 31 2025  7:15PM  EST        Assessment/Plan   Ms. Shreya Garces is a 34 y.o. female with a past medical history of lacunar stroke ( July 2018), HTN, HLD, migraine headache, DUSTIN and depression admitted with a diagnosis of cerebral venous thrombosis.  In the ED neurology was consulted and recommended to start her on heparin infusion and to neuro checks q2hr and to transfer the patient to [facility] [place].      Assessment & Plan  Dural venous sinus thrombosis ([facility])     # Right superior sagittal and sigmoid venous sinus thrombosis   # Hx of lacunar infarction  # C/F meningitis  - She presented with headache. Patient states that the the headache has been going on for the past 10 days. She has had flu-like symptoms for the past 3 weeks.  She localizes the headache more behind the right eye and its worsening through time.  - CT head venogram w and wo IV  contrast suggestive dural venous sinus thrombosis.  - Differential is broad and could be related to underlying infection, dehydration hypercoagulable state including malignancy.  Especially with previous history of stroke hypercoagulable state needs to be ruled out.  - NIH stroke scale of 0.  - Neurology consulted and recommended to start her on heparin infusion and transfer her to [facility] for deterioration.  - Neurology recommended to avoid hypotension SBP < 100  Plan  -Continue ceftriaxone and vancomycin  -Continue heparin drip  - Neuro check Q2hr  - Avoid SBP< 100  -Haptoglobin 340  -D-dimer 883  -HIV negative  - protein S/C WNL, Homocystine WNL  -Antithrombin 2 antigen, Factor VIII activity, VERONICA, ANCA, cardiolipin antibody, Lupus anticoagulant pending  - Neurology consulted-recommend hematology consultation  -Per neurosurgery, no intervention planned  -Per cardiology, FU hemoglobin electrophoresis, TTE  -Per ID, FU LP  -FU Hematology         # Leukocytosis-improving  # Thrombocytosis  # Microcytic anemia-iron deficiency  -H/H on admission is 7.1/26.7 with MCV of 63, Hgb on 6/10/2024 was 9.8  -WBC on admission 21.4, platelet count admission is 595  -No signs of active bleeding/no hematuria or hematemesis  -UA is negative for UTI  -Continue Venofer      CHRONIC PROBLEMS:  # HTN  # HLD  # Migraine headache  # DUSTIN  # Depression        Global Plan of Care  Fluid: None  Electrolytes: PRN  Nutrition:  Cardiac  DVT Prophylaxis:  Heparin  GI Prophylaxis: None  Code Status: Full Code         Prashant Dickens DO  Internal Medicine, PGY1

## 2025-02-12 NOTE — CARE PLAN
The patient's goals for the shift include  no pain.    The clinical goals for the shift include pt's neuro checks will remain WDL throughout the shift    Goals met. Pt neuro status exams  are WDL. Pt. Has intense pressure on her head. PRN. Dilaudid given q4 hours. Pt. Refused lumbar puncture.  And Rn educated on why this procedure is necessary. Pt. To be transferred to the Neuro [facility] at [facility] for further evaluation.

## 2025-02-12 NOTE — PROGRESS NOTES
[facility]  NEUROSCIENCE INTENSIVE CARE UNIT  DAILY PROGRESS NOTE       Patient Name: Shreya Garces   MRN: [id number]     Admit Date: 2025     : 1990 AGE: 34 y.o. GENDER: female      Subjective    34 year-old female with past medical history of prior lacunar infarct (2018), hypertension, hyperlipidemia, migraines, DUSTIN, and depression who presented to OSH ED on 25 with 10-day history of headache. 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 Patient transferred to [facility] for further neurological management after developing worsening headache and vision loss after initiating heparin infusion.    Per chart review, patient presented to OSH ED with 3-week history of flu-like symptoms and 1.5 weeks of right eye blurry vision with constant frontal head pressure. Patient found to have superior sagittal to right sigmoid sinus CVST. On 25, patient with continued headache, worsened lethargy with agitation, and worsened vision loss concerning for Cushing's reflex. Decision made to transfer to [facility] for further monitoring, ophthalmology evaluation, and possible neurosurgical intervention for CVST removal.     Interval Events: Admitted to NSU.    Objective   VITALS:  Temp:  [35.9 °C (96.6 °F)-36.5 °C (97.7 °F)] 36.4 °C (97.5 °F)  Heart Rate:  [38-80] 39  Resp:  [8-26] 14  BP: (139-184)/() 149/107  INTAKE/OUTPUT:  Intake/Output Summary (Last 24 hours) at 2025 1936  Last data filed at 2025 1800  Gross per 24 hour   Intake 10 ml   Output --   Net 10 ml         PHYSICAL EXAM:  NEURO:  - Drowsy, EOV (briskly), oriented x4, follows commands  - Pupils 3mm/reactive bilaterally  - VEGA 5/5 strength, no drift, no ataxia, SILT  CV:  - RRR on telemetry, SB-NSR  RESP:  - Regular, unlabored  - Oxygen: RA  :  - External catheter in place  GI:  - Abdomen NT/ND, soft  SKIN:  - Intact    MEDICATIONS:  Scheduled: PRN: Continuous:   cefTRIAXone, 2 g,  intravenous, q12h  [START ON 2/13/2025] folic acid, 1 mg, oral, Daily  heparin, 60 Units/kg, intravenous, Once  hydrOXYzine HCL, 25 mg, oral, Once  melatonin, 5 mg, oral, Nightly  polyethylene glycol, 17 g, oral, Daily  potassium chloride, 40 mEq, oral, Once  sennosides-docusate sodium, 2 tablet, oral, BID  [START ON 2/13/2025] vancomycin, 1,750 mg, intravenous, q12h     PRN medications: acetaminophen, heparin, hydrALAZINE, HYDROmorphone, labetaloL, ondansetron **OR** ondansetron, vancomycin heparin, 0-4,000 Units/hr  sodium chloride 0.9%, 75 mL/hr         LAB RESULTS:  Results from last 72 hours   Lab Units 02/12/25  0433 02/11/25  0630   GLUCOSE mg/dL 96 138*   SODIUM mmol/L 140 140   POTASSIUM mmol/L 3.6 4.0   CHLORIDE mmol/L 104 104   CO2 mmol/L 26 24   ANION GAP mmol/L 14 16   BUN mg/dL 8 7   CREATININE mg/dL 0.47* 0.43*   EGFR mL/min/1.73m*2 >90 >90   CALCIUM mg/dL 8.2* 8.7   PHOSPHORUS mg/dL 3.4 3.0   ALBUMIN g/dL 3.1* 3.4      Results from last 72 hours   Lab Units 02/12/25  1408 02/12/25  0433   WBC AUTO x10*3/uL 13.1* 14.9*   NRBC AUTO /100 WBCs 0.0 0.0   RBC AUTO x10*6/uL 4.43 3.98*   HEMOGLOBIN g/dL 8.0* 7.1*   HEMATOCRIT % 29.8* 25.8*   MCV fL 67* 65*   MCH pg 18.1* 17.8*   MCHC g/dL 26.8* 27.5*   RDW % 24.5* 24.1*   PLATELETS AUTO x10*3/uL 358 538*      Results from last 72 hours   Lab Units 02/12/25  1408 02/12/25  0433 02/11/25  0902 02/11/25  0630 02/10/25  2232   WBC AUTO x10*3/uL 13.1* 14.9*  --  17.6* 21.4*   NRBC AUTO /100 WBCs 0.0 0.0  --  0.0 0.0   RBC AUTO x10*6/uL 4.43 3.98*  --  3.98* 4.25   HEMOGLOBIN g/dL 8.0* 7.1*   < > 6.6* 7.1*   HEMATOCRIT % 29.8* 25.8*   < > 24.9* 26.7*   MCV fL 67* 65*  --  63* 63*   MCH pg 18.1* 17.8*  --  16.6* 16.7*   MCHC g/dL 26.8* 27.5*  --  26.5* 26.6*   RDW % 24.5* 24.1*  --  21.1* 21.2*   PLATELETS AUTO x10*3/uL 358 538*  --  549* 595*   NEUTROS PCT AUTO %  --  75.5  --   --  90.4   IG PCT AUTO %  --  0.7  --  0.8 0.8   LYMPHO PCT MAN %  --   --   --  7.0   --    LYMPHS PCT AUTO %  --  16.5  --   --  5.5   MONO PCT MAN %  --   --   --  2.0  --    MONOS PCT AUTO %  --  6.9  --   --  3.0   EOSINO PCT MAN %  --   --   --  0.0  --    EOS PCT AUTO %  --  0.1  --   --  0.1   BASOS PCT MAN %  --   --   --  0.0  --    BASOS PCT AUTO %  --  0.3  --   --  0.2   NEUTROS ABS x10*3/uL  --  11.23*  --   --  19.29*   IG AUTO x10*3/uL  --  0.11  --  0.14 0.17   LYMPHS ABS AUTO x10*3/uL  --  2.45  --   --  1.18*   MONOS ABS AUTO x10*3/uL  --  1.03*  --   --  0.65   EOS ABS MAN x10*3/uL  --   --   --  0.00  --    EOS ABS AUTO x10*3/uL  --  0.01  --   --  0.03   BASOS ABS MAN x10*3/uL  --   --   --  0.00  --    BASOS ABS AUTO x10*3/uL  --  0.04  --   --  0.04    < > = values in this interval not displayed.      Results from last 72 hours   Lab Units 02/11/25  0152   PROTIME seconds 12.7   INR  1.1   APTT seconds 29      Results from last 72 hours   Lab Units 02/12/25  0433 02/11/25  0630 02/10/25  2232   ALK PHOS U/L  --   --  110   BILIRUBIN TOTAL mg/dL  --   --  0.6   PROTEIN TOTAL g/dL  --   --  6.9   ALT U/L  --   --  29   AST U/L  --   --  72*   ALBUMIN g/dL 3.1* 3.4 3.5      Results from last 7 days   Lab Units 02/11/25  0029   BLOOD CULTURE  No growth at 1 day  No growth at 1 day      Results from last 7 days   Lab Units 02/12/25  1649 02/12/25  0433 02/11/25  0630   SODIUM mmol/L  --  140 140   SPEC GRAV UR  1.046*  --   --      Assessment/Plan    NEURO:  #CVST within posterior superior sagittal, straight, right transverse and right sigmoid sinus  #Prior lacunar infarcts  #Concern for meningitis  #Migraines  Assessment:  - Neurologically: see above  - See above history and significant events  - Bradycardic (since admission), hypertensive, bradypneic, decreased mentation vs. agitation  Plan:  - NSU  - STAT CTH  - Low intensity heparin infusion with PRN bolus  - Consult ophthalmology, concern for increased ICP  - Pending results of CTH consider consult to IR for thrombectomy vs.  consult to neurosurgery for bleed  - LP with infectious panel if patient is agreeable (refused at OSH)  - Neuro Checks: Q1H  - Folic acid 1mg, melatonin 5mg nightly  - Pain: acetaminophen PRN, hydromorphone PRN  - Nausea: ondansetron PRN  - PT/OT    CARDIOVASCULAR:  #HTN  #HLD  #Asymptomatic bradycardia  Assessment:  - ECHO  - report not finalized  Plan:  - Continue to monitor on telemetry  - BP goal: SBP >100 , SBP <180  - Hydralazine and Labetalol PRN    RESPIRATORY:  #Bradypneic  Assessment:  - Concern for cushing reflex in setting of CVST  Plan:  - Continuous pulse oximetry   - O2 PRN to maintain SpO2 > 94%, wean as tolerated  - Incentive spirometry while awake    RENAL/:  #No active issues  Assessment:  Results from last 72 hours   Lab Units 25  0433 25  0630   BUN mg/dL 8 7   CREATININE mg/dL 0.47* 0.43*   Plan:  - Monitor with daily RFP    FEN/GI:  #No active issues  Plan:  - Monitor and replace electrolytes per protocol  - IVF: NS @ 75 mL/hour  - Diet: NPO    - Bowel Regimen: Miralax, Lizette-Colace    ENDOCRINE:  #No active issues  Assessment:  Results from last 7 days   Lab Units 25  0433 25  0630 02/10/25  2232   GLUCOSE mg/dL 96 138* 183*   SODIUM mmol/L 140 140 140   Plan:  - Accuchecks & ISS PRN    HEMATOLOGY:  #Microcytic hypoproliferative anemia  Assessment:  Results from last 7 days   Lab Units 25  1408 25  0433 25  0902 25  0630   HEMOGLOBIN g/dL 8.0* 7.1* 6.8* 6.6*   HEMATOCRIT % 29.8* 25.8* 25.1* 24.9*   PLATELETS AUTO x10*3/uL 358 538*  --  549*   Plan:  - Continue to monitor with daily CBC and Coag panel    INFECTIOUS DISEASE:  #Concern for meningitis  #Leukocytosis, improving  Assessment:  Results from last 7 days   Lab Units 25  1408 25  0433 25  0630   WBC AUTO x10*3/uL 13.1* 14.9* 17.6*   - Temp (24hrs), Av.3 °C (97.4 °F), Min:35.9 °C (96.6 °F), Max:36.5 °C (97.7 °F)  - Afebrile  Plan:  - Continue to monitor for s/sx  of infection  - Pan culture for temperature > 38.4 C  - Continue Ceftriaxone 2g Q12H, vancomycin 1750mg BID  - Consider LP in AM    MUSCULOSKELETAL:  - No acute issues    SKIN:  - No acute issues  - Turns and skin care per NSU protocol    ACCESS:  - PIVs    PROPHYLAXIS:  - DVT Ppx: SCDs and systemic heparin infusion  - GI Ppx: not indicated    RESTRAINTS:  Not indicated/Patient does not meet criteria for restraints    Domingo Lovell, APRN-CNP  Neuroscience Intensive Care    Total critical care time of 60 minutes, with > 50% of time spent in direct contact with patient/family for education, counseling and coordination of care.

## 2025-02-12 NOTE — ED PROCEDURE NOTE
Procedure  Critical Care    Performed by: Sukhwinder Walsh MD  Authorized by: Sukhwinder Walsh MD    Critical care provider statement:     Critical care time (minutes):  45    Critical care time was exclusive of:  Separately billable procedures and treating other patients and teaching time    Critical care was necessary to treat or prevent imminent or life-threatening deterioration of the following conditions:  CNS failure or compromise    Critical care was time spent personally by me on the following activities:  Blood draw for specimens, ordering and performing treatments and interventions, development of treatment plan with patient or surrogate, ordering and review of laboratory studies, discussions with consultants, ordering and review of radiographic studies, evaluation of patient's response to treatment, re-evaluation of patient's condition, examination of patient and review of old charts    Care discussed with: admitting provider         Sukhwinder Walsh MD  02/12/25 0215

## 2025-02-12 NOTE — DISCHARGE SUMMARY
Discharge Diagnosis  Dural venous sinus thrombosis (HHS-HCC)    Issues Requiring Follow-Up  Dural venous sinus thrombosis  C/F meningitis    Discharge Meds     Medication List      You have not been prescribed any medications.       Test Results Pending At Discharge  Pending Labs       Order Current Status    VERONICA with Reflex to HAYDEN In process    ANCA-Associated Vasculitis Profile (ANCA,MPO,PR3) In process    ANCA-Associated Vasculitis Profile (ANCA,MPO,PR3) In process    Anti-thyroglobulin antibody In process    Antithrombin III antigen In process    Copper, Blood In process    Extra Urine Gray Tube In process    Factor V Leiden In process    JAK2 V617F Mutation by PCR, Qual In process    Lupus Anticoagulant With Interpretation [BARRERA] In process    Prothrombin Gene Mutation In process    Urinalysis with Reflex Culture and Microscopic In process    Zinc In process    Blood Culture Preliminary result    Blood Culture Preliminary result            Hospital Course  Ms. Shreya Garces is a 34 y.o. female with a past medical history of lacunar stroke ( July 2018), HTN, HLD, migraine headache, HELLP syndrome, NAFLD, ADHD, prediabetes, s/p gastric bypass, DUSTIN, and depression who presented to [facility] with headache of 10 days duration.  In ED, patient was bradycardic with HR of 40s, with other vitals stable.  CBC showed leukocytosis, anemia with hemoglobin 7.1, and MCV of 63.  NIH 0. CT head and CT head venogram showedHyperdense thrombus on unenhanced imaging with corresponding filling defects on CT venogram noted within the posterior superior sagittal sinus, the straight sinus, the right transverse sinus and the right sigmoid sinus consistent with extensive posterior dural venous thrombosis. Diminutive appearance of the left transverse sinus which may be developmental. No evidence of hemorrhagic infarct.  Patient was started on ceftriaxone, vancomycin, Flagyl, diphenhydramine, Depakote, and heparin.  Neurology was consulted  and recommended patient be started on heparin infusion and admitted for dural venous sinus thrombosis. Multiple tests were ordered including haptoglobin (elevated), HIV (negative), protein S/C WNL and Homocystine WNL. Also ordered Antithrombin 2 antigen, Factor VIII activity, VERONICA, ANCA, cardiolipin antibody, Lupus anticoagulant which are all pending.  Per neurosurgery, there was no indication for surgical intervention. Neurology suspected autoimmune hypercoagulation state and recommended hematology consult, blood pressure monitoring, and IV fluids.  Hematology recommended continued anticoagulation and Venofer. Cardiology recommended TTE (EF 64%) and hemoglobin electrophoresis.  Patient required 1 unit of RBCS on 2/11. Blood cultures resulted negative.  Patient was planned for LP for meningitis rule out but patient rejected this. Patient was determined to not have capacity. Due to brain pathology, increased ICP ,and cushings reflex, she was transferred to neuro critical care at [facility].         Pertinent Physical Exam At Time of Discharge  Physical Exam  Constitutional:       General: She is not in acute distress.  HENT:      Head: Normocephalic and atraumatic.   Cardiovascular:      Rate and Rhythm: Bradycardia present.   Pulmonary:      Effort: Pulmonary effort is normal. No respiratory distress.      Breath sounds: Normal breath sounds. No wheezing.   Abdominal:      General: Abdomen is flat. There is no distension.      Palpations: Abdomen is soft.      Tenderness: There is no abdominal tenderness. There is no guarding or rebound.   Musculoskeletal:      Right lower leg: No edema.      Left lower leg: No edema.   Skin:     General: Skin is warm and dry.   Neurological:      Mental Status: She is alert.         Outpatient Follow-Up  Future Appointments   Date Time Provider Department Center   2/13/2025 12:30 PM [facility] [address] [facility] DO  Internal Medicine, PGY1

## 2025-02-12 NOTE — PROGRESS NOTES
INPATIENT PROGRESS NOTES    PATIENT NAME: Shreya Garces    MRN: [id number]  SERVICE DATE:  2/12/2025   SERVICE TIME:  1:07 PM    SIGNATURE: Jaspal Armando MD       ASSESSMENT :   -Right cerebral venous thrombosis  -Leukocytosis  -Concern for meningitis  -Presented with headache and right ear pressure  -History of stroke, hypertension, hyperlipidemia, migraine        PLAN:  -Blood culture NGTD  -Continue vancomycin and ceftriaxone empirically  -Follow-up CSF findings  -Closely monitor for antibiotics side effects including rash, Diarrhea/CDI, thrombocytopenia, ANNE, etc.           SUBJECTIVE  Afebrile  No events overnight       OBJECTIVE  PHYSICAL EXAM:   Patient Vitals for the past 24 hrs:   BP Temp Temp src Pulse Resp SpO2   02/12/25 1200 -- -- -- (!) 38 (!) 9 96 %   02/12/25 1151 (!) 139/114 36.5 °C (97.7 °F) -- (!) 42 11 97 %   02/12/25 0800 -- -- -- 56 14 --   02/12/25 0727 -- -- -- (!) 38 10 --   02/12/25 0400 -- -- -- (!) 42 12 --   02/12/25 0334 148/87 36.4 °C (97.5 °F) Temporal -- -- 99 %   02/12/25 0018 170/87 36.4 °C (97.5 °F) Temporal 50 (!) 9 98 %   02/12/25 0000 -- -- -- 80 26 --   02/11/25 2000 -- -- -- (!) 40 (!) 8 --   02/11/25 1945 (!) 164/109 36.3 °C (97.3 °F) Temporal (!) 38 13 96 %   02/11/25 1613 (!) 177/96 36.3 °C (97.3 °F) Temporal (!) 38 10 99 %   02/11/25 1600 -- -- -- (!) 38 10 --   02/11/25 1416 (!) 141/95 36.1 °C (97 °F) Temporal (!) 49 20 99 %         Gen: NAD  Neck: symmetric, no mass  Cardiovascular: RRR  Respiratory: No distress       Labs:  Lab Results   Component Value Date    WBC 14.9 (H) 02/12/2025    HGB 7.1 (L) 02/12/2025    HCT 25.8 (L) 02/12/2025    MCV 65 (L) 02/12/2025     (H) 02/12/2025     Lab Results   Component Value Date    GLUCOSE 96 02/12/2025    CALCIUM 8.2 (L) 02/12/2025     02/12/2025    K 3.6 02/12/2025    CO2 26 02/12/2025     02/12/2025    BUN 8 02/12/2025    CREATININE 0.47 (L) 02/12/2025   ESR: --  Lab Results   Component Value Date     "SEDRATE 52 (H) 02/11/2025     Lab Results   Component Value Date    CRP 0.89 02/11/2025     Lab Results   Component Value Date    ALT 29 02/10/2025    AST 72 (H) 02/10/2025    ALKPHOS 110 02/10/2025    BILITOT 0.6 02/10/2025         DATA:   Diagnostic tests reviewed for today's visit:    Labs this admission reviewed  Imagings this admission reviewed  Cultures: Reviewed        Jaspal Armando MD.   Infectious Disease Attending            This note was partially created using voice recognition software and is inherently subject to errors including those of syntax and \"sound-alike\" substitutions which may escape proofreading. In such instances, original meaning may be extrapolated by contextual derivation       "

## 2025-02-12 NOTE — CARE PLAN
The patient's goals for the shift include      The clinical goals for the shift include pt's neuro checks will remain WDL throughout the shift. Goal met. Patient is alert and oriented with no neuro deficits at this time. Patient has been medicated for pain several times during the night per orders. Patient reported pain relief after medication administration. Order obtained for 1x dose of anxiolytic, per patient's request. Patient tolerated the dose well and was able to rest/sleep through the night.  Patient is stable and safety has been maintained. Bed in low/locked position. Call light within reach.     Problem: Pain  Goal: Takes deep breaths with improved pain control throughout the shift  Outcome: Met  Goal: Turns in bed with improved pain control throughout the shift  Outcome: Met  Goal: Free from acute confusion related to pain meds throughout the shift  Outcome: Met     Problem: Pain - Adult  Goal: Verbalizes/displays adequate comfort level or baseline comfort level  Outcome: Met     Problem: Safety - Adult  Goal: Free from fall injury  Outcome: Met     Problem: Discharge Planning  Goal: Discharge to home or other facility with appropriate resources  Outcome: Progressing     Problem: Chronic Conditions and Co-morbidities  Goal: Patient's chronic conditions and co-morbidity symptoms are monitored and maintained or improved  Outcome: Met     Problem: Nutrition  Goal: Nutrient intake appropriate for maintaining nutritional needs  Outcome: Progressing     Problem: Arrythmia/Dysrhythmia  Goal: Promote self management  Outcome: Progressing  Goal: Vital signs return to baseline  Outcome: Progressing

## 2025-02-12 NOTE — PROGRESS NOTES
Vancomycin Dosing by Pharmacy- FOLLOW UP    Shreya Garces is a 34 y.o. year old female who Pharmacy has been consulted for vancomycin dosing for CNS/meningoencephalitis. Based on the patient's indication and renal status this patient is being dosed based on a goal AUC of 500-600.     Renal function is currently stable.    Current vancomycin dose: 1500 mg given every 12 hours    Most recent random level: 14.4 mcg/mL    Visit Vitals  /87 (BP Location: Left arm, Patient Position: Lying)   Pulse (!) 38   Temp 36.4 °C (97.5 °F) (Temporal)   Resp (!) 9        Lab Results   Component Value Date    CREATININE 0.47 (L) 2025    CREATININE 0.43 (L) 2025    CREATININE 0.47 (L) 02/10/2025    CREATININE 0.59 06/10/2024        Patient weight is as follows:   Vitals:    25 0508   Weight: 64 kg (141 lb 1.5 oz)       Cultures:  No results found for the encounter in last 14 days.       I/O last 3 completed shifts:  In: 398.7 (6.2 mL/kg) [I.V.:50.4 (0.8 mL/kg); Blood:348.3]  Out: 700 (10.9 mL/kg) [Urine:700 (0.3 mL/kg/hr)]  Weight: 64 kg   I/O during current shift:  No intake/output data recorded.    Temp (24hrs), Av.3 °C (97.3 °F), Min:36.1 °C (97 °F), Max:36.4 °C (97.5 °F)      Assessment/Plan    Below goal AUC. Orders placed for new vancomcyin regimen of 1750 every 12 hours to begin at 1500.     This dosing regimen is predicted by [employer]Rx to result in the following pharmacokinetic parameters:  Loading dose: N/A  Regimen: 1750 mg IV every 12 hours.  Start time: 15:47 on 2025  Exposure target: AUC24 (range)400-600 mg/L.hr   [id number]: 499 mg/L.hr  AUC24,ss: 507 mg/L.hr  Probability of AUC24 > 400: 93 %  Ctrough,ss: 11.4 mg/L  Probability of Ctrough,ss > 20: 2 %  The next level will be obtained on  at 1000. May be obtained sooner if clinically indicated.   Will continue to monitor renal function daily while on vancomycin and order serum creatinine at least every 48 hours if not already  ordered.  Follow for continued vancomycin needs, clinical response, and signs/symptoms of toxicity.   LEONIDAS PHILLIPS

## 2025-02-12 NOTE — CONSULTS
Inpatient consult to Neurology  Consult performed by: Soila Wasserman MD  Consult ordered by: Abbey Gloria MD          History Of Present Illness  Shreya Garces is a 34 y.o. female presenting with a past medical history known for HELLP syndrome, preeclampsia leading to premature delivery of her previous baby who did not survive for more than 3 months postdelivery, migraine headache or present to the hospital for further evaluation of bifrontal pressure sensation for the last 2 weeks with associated fever chills blurry vision on the right side as well as muffled noise on the right side.  The patient is a complaint of worsening of the symptoms with associated mild nausea, numbness in both hands most on the right compared to the left.  She noticed no worsening of the symptoms by changing her position and sitting to standing.    She denies any history of DVTs, pulmonary embolism, rheumatoid arthritis, rheumatological disorder.     She denies any family history of premature myocardial infarction, pulm embolism DVTs.    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    During the hospital stay the patient was evaluated by infectious disease and was started on antibiotics in addition to acyclovir with a concern of meningitis, in addition patient was started on IV heparin with some improvement of headache going down from 10 out of 10 to 8 out of 10 in severity.    She was evaluated by neurosurgery and no imminent neurosurgical intervention was recommended.    She was evaluated cardiology for associated bradycardia and was recommended to have transthoracic echocardiogram,  "to continue monitoring of the heart.    She normally does not have bradycardia at the baseline    Teleneurology was consulted as of yesterday and was recommended to put her IV heparin and start hypercoagulation state blood workup.     The patient continues to complain of blurred vision mainly affecting the right eye with associated increased central blurriness with no double vision, no eye movement induced pain.        Past Medical History  Past Medical History:   Diagnosis Date    Other conditions influencing health status     History of back problems    Personal history of other diseases of the circulatory system     History of essential hypertension    Personal history of other diseases of the nervous system and sense organs     History of migraine     Surgical History  Past Surgical History:   Procedure Laterality Date     SECTION, CLASSIC  2017     Section    MR HEAD ANGIO WO IV CONTRAST  2019    MR HEAD ANGIO WO IV CONTRAST 2019 [facility] [place] [facility]CILLARY LEGACY     Social History  Social History     Tobacco Use    Smoking status: Never    Smokeless tobacco: Never   Vaping Use    Vaping status: Every Day    Substances: Nicotine    Devices: Disposable   Substance Use Topics    Alcohol use: Yes     Comment: Occasionally     Allergies  Atorvastatin and Penicillins  No medications prior to admission.       Review of Systems  Neurological Exam  Physical Exam  Last Recorded Vitals  Blood pressure (!) 177/96, pulse (!) 38, temperature 36.3 °C (97.3 °F), temperature source Temporal, resp. rate 10, height 1.626 m (5' 4\"), weight 64 kg (141 lb 1.5 oz), SpO2 99%.    GENERAL APPEARANCE:  No distress, alert and cooperative.               CRANIAL NERVES:  Cranial nerves were normal.      CN 2-  Questionable right optic disc edema, and enlarged blind spot.        CN 3, 4, 6-  Pupils round, 4 mm in diameter, equally reactive to light. No ptosis. EOMs normal alignment, full range of movement, no nystagmus    "  CN 5- Facial sensation intact bilaterally. Normal corneal reflexes.      CN 7- Normal and symmetric facial strength. Nasolabial folds symmetric.     CN 8- Hearing intact to finger rub, whisper.      CN 9- Palate elevates symmetrically. Normal gag reflex.      CN 11- Normal strength of shoulder shrug and neck turning      CN 12- Tongue midline, with normal bulk and strength; no fasciculations.     MOTOR:  Motor exam was normal. Muscle bulk and tone were normal in both upper and lower extremities. Muscle strength was 5/5 in distal and proximal muscles in both upper and lower extremities. No fasciculations, tremor or other abnormal movements were present.     REFLEXES:  Hyper-reflexia.   Babinski: toes upgoing to plantar stimulation(right). No clonus, frontal release signs or other pathologic reflexes present.     SENSORY: Sensory exam was intact to light touch, sharp/dull, vibration and position sense in both UE and LE.     COORDINATION: DOUGLAS were intact in upper and lower extremities.  In UE- finger-nose-finger was intact and in LE- heel-to-shin was intact without dysmetria or overshoot.           NIH Stroke Scale  1A. Level of Consciousness: Alert, Keenly Responsive  1B. Ask Month and Age: Both Questions Right  1C. Blink Eyes & Squeeze Hands: Performs Both Tasks  2. Best Gaze: Normal  3. Visual: No Visual Loss  4. Facial Palsy: Normal Symmetrical Movements  5A. Motor - Left Arm: No Drift  5B. Motor - Right Arm: Drift  6A. Motor - Left Leg: No Drift  6B. Motor - Right Leg: No Drift  7. Limb Ataxia: Absent  8. Sensory Loss: Normal  9. Best Language: No Aphasia  10. Dysarthria: Normal  11. Extinction and Inattention: No Abnormality  NIH Stroke Scale: 1           [place] Coma Scale  Best Eye Response: Spontaneous  Best Verbal Response: Oriented  Best Motor Response: Follows commands  Ricky Coma Scale Score: 15              I have personally reviewed the patient's lab work and results for the last year:  Admission on  02/10/2025   Component Date Value Ref Range Status    WBC 02/10/2025 21.4 (H)  4.4 - 11.3 x10*3/uL Final    nRBC 02/10/2025 0.0  0.0 - 0.0 /100 WBCs Final    RBC 02/10/2025 4.25  4.00 - 5.20 x10*6/uL Final    Hemoglobin 02/10/2025 7.1 (L)  12.0 - 16.0 g/dL Final    Hematocrit 02/10/2025 26.7 (L)  36.0 - 46.0 % Final    MCV 02/10/2025 63 (L)  80 - 100 fL Final    MCH 02/10/2025 16.7 (L)  26.0 - 34.0 pg Final    MCHC 02/10/2025 26.6 (L)  32.0 - 36.0 g/dL Final    RDW 02/10/2025 21.2 (H)  11.5 - 14.5 % Final    Platelets 02/10/2025 595 (H)  150 - 450 x10*3/uL Final    Neutrophils % 02/10/2025 90.4  40.0 - 80.0 % Final    Immature Granulocytes %, Automated 02/10/2025 0.8  0.0 - 0.9 % Final    Immature Granulocyte Count (IG) includes promyelocytes, myelocytes and metamyelocytes but does not include bands. Percent differential counts (%) should be interpreted in the context of the absolute cell counts (cells/UL).    Lymphocytes % 02/10/2025 5.5  13.0 - 44.0 % Final    Monocytes % 02/10/2025 3.0  2.0 - 10.0 % Final    Eosinophils % 02/10/2025 0.1  0.0 - 6.0 % Final    Basophils % 02/10/2025 0.2  0.0 - 2.0 % Final    Neutrophils Absolute 02/10/2025 19.29 (H)  1.20 - 7.70 x10*3/uL Final    Percent differential counts (%) should be interpreted in the context of the absolute cell counts (cells/uL).    Immature Granulocytes Absolute, Au* 02/10/2025 0.17  0.00 - 0.70 x10*3/uL Final    Lymphocytes Absolute 02/10/2025 1.18 (L)  1.20 - 4.80 x10*3/uL Final    Monocytes Absolute 02/10/2025 0.65  0.10 - 1.00 x10*3/uL Final    Eosinophils Absolute 02/10/2025 0.03  0.00 - 0.70 x10*3/uL Final    Basophils Absolute 02/10/2025 0.04  0.00 - 0.10 x10*3/uL Final    Glucose 02/10/2025 183 (H)  74 - 99 mg/dL Final    Sodium 02/10/2025 140  136 - 145 mmol/L Final    Potassium 02/10/2025 3.4 (L)  3.5 - 5.3 mmol/L Final    Chloride 02/10/2025 103  98 - 107 mmol/L Final    Bicarbonate 02/10/2025 26  21 - 32 mmol/L Final    Anion Gap 02/10/2025 14   10 - 20 mmol/L Final    Urea Nitrogen 02/10/2025 11  6 - 23 mg/dL Final    Creatinine 02/10/2025 0.47 (L)  0.50 - 1.05 mg/dL Final    eGFR 02/10/2025 >90  >60 mL/min/1.73m*2 Final    Calculations of estimated GFR are performed using the 2021 CKD-EPI Study Refit equation without the race variable for the IDMS-Traceable creatinine methods.  https://jasn.asnjournals.org/content/early/2021/09/22/ASN.5694647914    Calcium 02/10/2025 8.5 (L)  8.6 - 10.3 mg/dL Final    Albumin 02/10/2025 3.5  3.4 - 5.0 g/dL Final    Alkaline Phosphatase 02/10/2025 110  33 - 110 U/L Final    Total Protein 02/10/2025 6.9  6.4 - 8.2 g/dL Final    AST 02/10/2025 72 (H)  9 - 39 U/L Final    Bilirubin, Total 02/10/2025 0.6  0.0 - 1.2 mg/dL Final    ALT 02/10/2025 29  7 - 45 U/L Final    Patients treated with Sulfasalazine may generate falsely decreased results for ALT.    HCG, Beta-Quantitative 02/10/2025 <2  <5 mIU/mL Final    RBC Morphology 02/10/2025 See Below   Final    Stomatocytes 02/10/2025 Few   Final    Lactate 02/11/2025 2.0  0.4 - 2.0 mmol/L Final    Blood Culture 02/11/2025 Loaded on Instrument - Culture in progress   Preliminary    Blood Culture 02/11/2025 Loaded on Instrument - Culture in progress   Preliminary    ABO TYPE 02/11/2025 O   Final    Rh TYPE 02/11/2025 POS   Final    ANTIBODY SCREEN 02/11/2025 NEG   Final    ABO TYPE 02/11/2025 O   Final    Rh TYPE 02/11/2025 POS   Final    PRODUCT CODE 02/11/2025 [id number]   Final    Unit Number 02/11/2025 [id number]   Final    Unit ABO 02/11/2025 O   Final    Unit RH 02/11/2025 POS   Final    XM INTEP 02/11/2025 COMP   Final    Dispense Status 02/11/2025 TR   Final    Blood Expiration Date 02/11/2025 3/4/2025 11:59:00 PM EST   Final    PRODUCT BLOOD TYPE 02/11/2025 5100   Final    UNIT VOLUME 02/11/2025 350   Final-Edited    Protime 02/11/2025 12.7  9.8 - 12.8 seconds Final    INR 02/11/2025 1.1  0.9 - 1.1 Final    aPTT 02/11/2025 29  27 - 38 seconds Final    Retic % 02/10/2025  "1.8  0.5 - 2.0 % Final    Retic Absolute 02/10/2025 0.076  0.018 - 0.083 x10*6/uL Final    Reticulocyte Hemoglobin 02/10/2025 19 (L)  28 - 38 pg Final    Immature Retic fraction 02/10/2025 27.4 (H)  <=16.0 % Final    Reticulocytes are measured based on a fluorescent technique. The IRF, or immature reticulocyte fraction, is the percent of reticulocytes that show medium (MFR) or high (HFR) fluorescence.  This value can be used to assess the relative maturity of the reticulocyte population in response to anemia. The \"shift reticulocytes\" are not measured by this technique, eliminating the need for their correction in the reticulocyte index.    LDH 02/10/2025 224  84 - 246 U/L Final    Haptoglobin 02/11/2025 340 (H)  30 - 200 mg/dL Final    Protein S Activity 02/11/2025 94  64 - 150 % activity Final    Protein C Activity 02/11/2025 79  70 - 150 % activity Final    Homocysteine 02/11/2025 5.72  5.00 - 13.90 umol/L Final    Factor VIII Activity 02/11/2025 226 (H)  55 - 180 % Final    Heparin Unfractionated 02/11/2025 0.8  See Comment Below for Therapeutic Ranges IU/mL Final    WBC 02/11/2025 17.6 (H)  4.4 - 11.3 x10*3/uL Final    nRBC 02/11/2025 0.0  0.0 - 0.0 /100 WBCs Final    RBC 02/11/2025 3.98 (L)  4.00 - 5.20 x10*6/uL Final    Hemoglobin 02/11/2025 6.6 (L)  12.0 - 16.0 g/dL Final    Hematocrit 02/11/2025 24.9 (L)  36.0 - 46.0 % Final    MCV 02/11/2025 63 (L)  80 - 100 fL Final    MCH 02/11/2025 16.6 (L)  26.0 - 34.0 pg Final    MCHC 02/11/2025 26.5 (L)  32.0 - 36.0 g/dL Final    RDW 02/11/2025 21.1 (H)  11.5 - 14.5 % Final    Platelets 02/11/2025 549 (H)  150 - 450 x10*3/uL Final    Immature Granulocytes %, Automated 02/11/2025 0.8  0.0 - 0.9 % Final    Immature Granulocyte Count (IG) includes promyelocytes, myelocytes and metamyelocytes but does not include bands. Percent differential counts (%) should be interpreted in the context of the absolute cell counts (cells/UL).    Immature Granulocytes Absolute, Au* " 02/11/2025 0.14  0.00 - 0.70 x10*3/uL Final    Glucose 02/11/2025 138 (H)  74 - 99 mg/dL Final    Sodium 02/11/2025 140  136 - 145 mmol/L Final    Potassium 02/11/2025 4.0  3.5 - 5.3 mmol/L Final    Chloride 02/11/2025 104  98 - 107 mmol/L Final    Bicarbonate 02/11/2025 24  21 - 32 mmol/L Final    Anion Gap 02/11/2025 16  10 - 20 mmol/L Final    Urea Nitrogen 02/11/2025 7  6 - 23 mg/dL Final    Creatinine 02/11/2025 0.43 (L)  0.50 - 1.05 mg/dL Final    eGFR 02/11/2025 >90  >60 mL/min/1.73m*2 Final    Calculations of estimated GFR are performed using the 2021 CKD-EPI Study Refit equation without the race variable for the IDMS-Traceable creatinine methods.  https://jasn.asnjournals.org/content/early/2021/09/22/ASN.0525767823    Calcium 02/11/2025 8.7  8.6 - 10.3 mg/dL Final    Phosphorus 02/11/2025 3.0  2.5 - 4.9 mg/dL Final    The performance characteristics of phosphorus testing in heparinized plasma have been validated by the individual  laboratory site where testing is performed. Testing on heparinized plasma is not approved by the FDA; however, such approval is not necessary.    Albumin 02/11/2025 3.4  3.4 - 5.0 g/dL Final    Iron 02/11/2025 <10 (L)  35 - 150 ug/dL Final    UIBC 02/11/2025 359  110 - 370 ug/dL Final    TIBC 02/11/2025    Final    One or more of the analytes used in this calculation is outside of the analytical measurement range.      % Saturation 02/11/2025    Final    One or more analytes used in this calculation is outside of the analytical measurement range. Calculation cannot be performed.    Ferritin 02/11/2025 14  8 - 150 ng/mL Final    Vitamin B12 02/11/2025 424  211 - 911 pg/mL Final    Folate, Serum 02/11/2025 6.0  >5.0 ng/mL Final    Neutrophils %, Manual 02/11/2025 89.0  40.0 - 80.0 % Final    Percent differential counts (%) should be interpreted in the context of the absolute cell counts (cells/uL).    Bands %, Manual 02/11/2025 2.0  0.0 - 5.0 % Final    Lymphocytes %, Manual  02/11/2025 7.0  13.0 - 44.0 % Final    Monocytes %, Manual 02/11/2025 2.0  2.0 - 10.0 % Final    Eosinophils %, Manual 02/11/2025 0.0  0.0 - 6.0 % Final    Basophils %, Manual 02/11/2025 0.0  0.0 - 2.0 % Final    Seg Neutrophils Absolute, Manual 02/11/2025 15.66 (H)  1.20 - 7.00 x10*3/uL Final    Bands Absolute, Manual 02/11/2025 0.35  0.00 - 0.70 x10*3/uL Final    Lymphocytes Absolute, Manual 02/11/2025 1.23  1.20 - 4.80 x10*3/uL Final    Monocytes Absolute, Manual 02/11/2025 0.35  0.10 - 1.00 x10*3/uL Final    Eosinophils Absolute, Manual 02/11/2025 0.00  0.00 - 0.70 x10*3/uL Final    Basophils Absolute, Manual 02/11/2025 0.00  0.00 - 0.10 x10*3/uL Final    Total Cells Counted 02/11/2025 100   Final    Neutrophils Absolute, Manual 02/11/2025 16.01 (H)  1.20 - 7.70 x10*3/uL Final    RBC Morphology 02/11/2025 See Below   Final    Polychromasia 02/11/2025 Mild   Final    Hypochromia 02/11/2025 Mild   Final    Acanthocytes 02/11/2025 Few   Final    Stomatocytes 02/11/2025 Few   Final    Giant Platelets 02/11/2025 Few   Final    Heparin Unfractionated 02/11/2025 0.5  See Comment Below for Therapeutic Ranges IU/mL Final    Hemoglobin 02/11/2025 6.8 (L)  12.0 - 16.0 g/dL Final    Hematocrit 02/11/2025 25.1 (L)  36.0 - 46.0 % Final    C-Reactive Protein 02/11/2025 0.89  <1.00 mg/dL Final    Folate, Serum 02/11/2025 4.8 (L)  >5.0 ng/mL Final    PRODUCT CODE 02/11/2025 [id number]   Final    Unit Number 02/11/2025 [id number]   Final    Unit ABO 02/11/2025 O   Final    Unit RH 02/11/2025 POS   Final    XM INTEP 02/11/2025 COMP   Final    Dispense Status 02/11/2025 XM   Final    Blood Expiration Date 02/11/2025 3/6/2025 11:59:00 PM EST   Final    PRODUCT BLOOD TYPE 02/11/2025 5100   Final    UNIT VOLUME 02/11/2025 350   Final    Extra Tube 02/11/2025 Hold for add-ons.   Final    Auto resulted.    Heparin Unfractionated 02/11/2025 0.4  See Comment Below for Therapeutic Ranges IU/mL Final    BSA 02/11/2025 1.7  m2 In process  "   Retic % 02/11/2025 2.0  0.5 - 2.0 % Final    Retic Absolute 02/11/2025 0.080  0.018 - 0.083 x10*6/uL Final    Reticulocyte Hemoglobin 02/11/2025 19 (L)  28 - 38 pg Final    Immature Retic fraction 02/11/2025 22.0 (H)  <=16.0 % Final    Reticulocytes are measured based on a fluorescent technique. The IRF, or immature reticulocyte fraction, is the percent of reticulocytes that show medium (MFR) or high (HFR) fluorescence.  This value can be used to assess the relative maturity of the reticulocyte population in response to anemia. The \"shift reticulocytes\" are not measured by this technique, eliminating the need for their correction in the reticulocyte index.    Sedimentation Rate 02/11/2025 52 (H)  0 - 20 mm/h Final   Admission on 06/10/2024, Discharged on 06/10/2024   Component Date Value Ref Range Status    Lipase 06/10/2024 10  9 - 82 U/L Final    Ventricular Rate 06/10/2024 54  BPM Final    Atrial Rate 06/10/2024 54  BPM Final    VT Interval 06/10/2024 162  ms Final    QRS Duration 06/10/2024 94  ms Final    QT Interval 06/10/2024 442  ms Final    QTC Calculation(Bazett) 06/10/2024 419  ms Final    P Axis 06/10/2024 41  degrees Final    R Axis 06/10/2024 57  degrees Final    T Axis 06/10/2024 36  degrees Final    QRS Count 06/10/2024 9  beats Final    Q Onset 06/10/2024 215  ms Final    P Onset 06/10/2024 134  ms Final    P Offset 06/10/2024 182  ms Final    T Offset 06/10/2024 436  ms Final    QTC Fredericia 06/10/2024 426  ms Final    Glucose 06/10/2024 97  74 - 99 mg/dL Final    Sodium 06/10/2024 136  136 - 145 mmol/L Final    Potassium 06/10/2024 4.1  3.5 - 5.3 mmol/L Final    MILD HEMOLYSIS DETECTED. The result may be falsely elevated due to hemolysis or other interferents. Clinical correlation is recommended. Repeat testing may be considered.    Chloride 06/10/2024 102  98 - 107 mmol/L Final    Bicarbonate 06/10/2024 24  21 - 32 mmol/L Final    Anion Gap 06/10/2024 14  10 - 20 mmol/L Final    Urea " Nitrogen 06/10/2024 14  6 - 23 mg/dL Final    Creatinine 06/10/2024 0.59  0.50 - 1.05 mg/dL Final    eGFR 06/10/2024 >90  >60 mL/min/1.73m*2 Final    Calculations of estimated GFR are performed using the 2021 CKD-EPI Study Refit equation without the race variable for the IDMS-Traceable creatinine methods.  https://jasn.asnjournals.org/content/early/2021/09/22/ASN.6685725917    Calcium 06/10/2024 9.2  8.6 - 10.3 mg/dL Final    Albumin 06/10/2024 4.7  3.4 - 5.0 g/dL Final    Alkaline Phosphatase 06/10/2024 117 (H)  33 - 110 U/L Final    Total Protein 06/10/2024 8.5 (H)  6.4 - 8.2 g/dL Final    AST 06/10/2024 39  9 - 39 U/L Final    MILD HEMOLYSIS DETECTED. The result may be falsely elevated due to hemolysis or other interferents. Clinical correlation is recommended. Repeat testing may be considered.    Bilirubin, Total 06/10/2024 1.3 (H)  0.0 - 1.2 mg/dL Final    ALT 06/10/2024 22  7 - 45 U/L Final    Patients treated with Sulfasalazine may generate falsely decreased results for ALT.    WBC 06/10/2024 11.9 (H)  4.4 - 11.3 x10*3/uL Final    nRBC 06/10/2024 0.0  0.0 - 0.0 /100 WBCs Final    RBC 06/10/2024 5.47 (H)  4.00 - 5.20 x10*6/uL Final    Hemoglobin 06/10/2024 9.8 (L)  12.0 - 16.0 g/dL Final    Hematocrit 06/10/2024 34.5 (L)  36.0 - 46.0 % Final    MCV 06/10/2024 63 (L)  80 - 100 fL Final    MCH 06/10/2024 17.9 (L)  26.0 - 34.0 pg Final    MCHC 06/10/2024 28.4 (L)  32.0 - 36.0 g/dL Final    RDW 06/10/2024 20.5 (H)  11.5 - 14.5 % Final    Platelets 06/10/2024 307  150 - 450 x10*3/uL Final    Neutrophils % 06/10/2024 84.8  40.0 - 80.0 % Final    Immature Granulocytes %, Automated 06/10/2024 0.4  0.0 - 0.9 % Final    Immature Granulocyte Count (IG) includes promyelocytes, myelocytes and metamyelocytes but does not include bands. Percent differential counts (%) should be interpreted in the context of the absolute cell counts (cells/UL).    Lymphocytes % 06/10/2024 8.3  13.0 - 44.0 % Final    Monocytes % 06/10/2024 5.9   2.0 - 10.0 % Final    Eosinophils % 06/10/2024 0.3  0.0 - 6.0 % Final    Basophils % 06/10/2024 0.3  0.0 - 2.0 % Final    Neutrophils Absolute 06/10/2024 10.07 (H)  1.20 - 7.70 x10*3/uL Final    Percent differential counts (%) should be interpreted in the context of the absolute cell counts (cells/uL).    Immature Granulocytes Absolute, Au* 06/10/2024 0.05  0.00 - 0.70 x10*3/uL Final    Lymphocytes Absolute 06/10/2024 0.98 (L)  1.20 - 4.80 x10*3/uL Final    Monocytes Absolute 06/10/2024 0.70  0.10 - 1.00 x10*3/uL Final    Eosinophils Absolute 06/10/2024 0.03  0.00 - 0.70 x10*3/uL Final    Basophils Absolute 06/10/2024 0.03  0.00 - 0.10 x10*3/uL Final    HCG, Urine 06/10/2024 NEGATIVE  NEGATIVE Final    Color, Urine 06/10/2024 Yellow  Light-Yellow, Yellow, Dark-Yellow Final    Appearance, Urine 06/10/2024 Clear  Clear Final    Specific Gravity, Urine 06/10/2024 1.042 (N)  1.005 - 1.035 Final    pH, Urine 06/10/2024 5.5  5.0, 5.5, 6.0, 6.5, 7.0, 7.5, 8.0 Final    Protein, Urine 06/10/2024 20 (TRACE)  NEGATIVE, 10 (TRACE), 20 (TRACE) mg/dL Final    Glucose, Urine 06/10/2024 OVER (4+) (A)  Normal mg/dL Final    Blood, Urine 06/10/2024 NEGATIVE  NEGATIVE Final    Ketones, Urine 06/10/2024 NEGATIVE  NEGATIVE mg/dL Final    Bilirubin, Urine 06/10/2024 NEGATIVE  NEGATIVE Final    Urobilinogen, Urine 06/10/2024 Normal  Normal mg/dL Final    Nitrite, Urine 06/10/2024 NEGATIVE  NEGATIVE Final    Leukocyte Esterase, Urine 06/10/2024 NEGATIVE  NEGATIVE Final    Extra Tube 06/10/2024 Hold for add-ons.   Final    Auto resulted.    WBC, Urine 06/10/2024 1-5  1-5, NONE /HPF Final    RBC, Urine 06/10/2024 >20 (A)  NONE, 1-2, 3-5 /HPF Final    Squamous Epithelial Cells, Urine 06/10/2024 1-9 (SPARSE)  Reference range not established. /HPF Final    Mucus, Urine 06/10/2024 1+  Reference range not established. /LPF Final    Extra Tube 06/10/2024 Hold for add-ons.   Final    Auto resulted.    RBC Morphology 06/10/2024 See Below   Final     Polychromasia 06/10/2024 Mild   Final    Ovalocytes 06/10/2024 Few   Final     The patient's laboratory results show the following abnormalities:    WBC: 21.4 x10³/uL (Ref: 4.4 - 11.3 x10³/uL)  Hemoglobin: 7.1 g/dL (Ref: 12.0 - 16.0 g/dL)  Hematocrit: 26.7% (Ref: 36.0 - 46.0%)  MCV: 63 fL (Ref: 80 - 100 fL)  MCH: 16.7 pg (Ref: 26.0 - 34.0 pg)  MCHC: 26.6 g/dL (Ref: 32.0 - 36.0 g/dL)  RDW: 21.2% (Ref: 11.5 - 14.5%)  Platelets: 595 x10³/uL (Ref: 150 - 450 x10³/uL)  Neutrophils %: 90.4% (Ref: 40.0 - 80.0%)  Neutrophils Absolute: 19.29 x10³/uL (Ref: 1.20 - 7.70 x10³/uL)  Lymphocytes %: 5.5% (Ref: 13.0 - 44.0%)  Lymphocytes Absolute: 1.18 x10³/uL (Ref: 1.20 - 4.80 x10³/uL)  Glucose: 183 mg/dL (Ref: 74 - 99 mg/dL)  Potassium: 3.4 mmol/L (Ref: 3.5 - 5.3 mmol/L)  Creatinine: 0.47 mg/dL (Ref: 0.50 - 1.05 mg/dL)  Calcium: 8.5 mg/dL (Ref: 8.6 - 10.3 mg/dL)  AST: 72 U/L (Ref: 9 - 39 U/L)  Haptoglobin: 340 mg/dL (Ref: 30 - 200 mg/dL)  Factor VIII Activity: 226% (Ref: 55 - 180%)  ESR: 52 mm/hr (Ref: 0 - 20 mm/hr)        Key Laboratory Findings and Interpretation:  1. Hemolysis  Anemia with Microangiopathic Features, mild Liver Dysfunction  Mildly elevated AST (72 U/L, High) without significant ALT elevation - Suggests microvascular hepatic injury rather than primary liver disease.      3. Coagulation and Platelets (Hypercoagulability Concern for CVT)  Platelets: 595 ? 549 x10³/uL (Elevated, rather than low) - Reactive thrombocytosis instead of the expected thrombocytopenia in HELLP.  Factor VIII: 226% (High) - Suggests a hypercoagulable state.      4. Inflammation and Endothelial Dysfunction  WBC: 21.4 ? 17.6 x10³/uL (Elevated)  Neutrophils: 90.4% (Markedly High)  CRP: 0.89 (Near upper limit)  ESR: 52 mm/hr (Elevated) - Can be associated with thrombotic or inflammatory states.      5. Electrolytes and Renal Function  Urinalysis: Trace proteinuria, >20 RBCs/hpf - Suggests endothelial damage (possible preeclampsia).  Likely  Clinical Correlation  HELLP Syndrome/Preeclampsia Variant:    Hemolysis, AST elevation, anemia. a reactive thrombocytosis may occur post-recovery or in inflammatory states.    High Risk for Central Venous Thrombosis (CVT):    Elevated Factor VIII, inflammatory markers, endothelial dysfunction, and hypercoagulability.     I have personally reviewed the following imaging results MR venography intracranial w and wo IV contrast    Result Date: 2/11/2025  Interpreted By:  Kodak Castrejon, STUDY: MR VENOGRAPHY INTRACRANIAL W AND WO IV CONTRAST;  2/11/2025 7:11 pm   INDICATION: Signs/Symptoms:Central venous thrombosis.     COMPARISON: MRI brain today and CTV today.   ACCESSION NUMBER(S): [id number]   ORDERING CLINICIAN: KEHINDE RAY   TECHNIQUE: 2-D time-of-flight intracranial MRV and subsequent 2-D and 3-D reconstructions by the technologist. Additional axial volumetric T1 postcontrast acquisition.   FINDINGS: Again demonstrated is extensive thrombosis posterior half of the superior sagittal sinus, straight sinus, vein of Conor, right sigmoid and transverse sinus into the right internal jugular vein, and proximal left transverse sinus.       Again demonstrated is extensive thrombosis posterior half of the superior sagittal sinus, straight sinus, vein of Conor, right sigmoid and transverse sinus into the right internal jugular vein, and proximal left transverse sinus.   MACRO: None   Signed by: Kodak Castrejon 2/11/2025 7:26 PM Dictation workstation:   [id number]    MR brain w and wo IV contrast    Result Date: 2/11/2025  Interpreted By:  Kodak Castrejon, STUDY: MR BRAIN W AND WO IV CONTRAST;  2/11/2025 7:11 pm   INDICATION: Signs/Symptoms:Cerebral venous thrombosis Please assess for cavernou sinus thrombosis or strokes.     COMPARISON: CTV earlier today.   ACCESSION NUMBER(S): [id number]   ORDERING CLINICIAN: APOLINAR ALEX   TECHNIQUE: T2, FLAIR, DWI, ADC, gradient echo T2, and T1 weighted images of brain were  acquired without intravenous contrast administration. Multi planar T1 weighted imaging was acquired after administration of 13 ML Dotarem gadolinium based intravenous contrast.   FINDINGS: No abnormal brain parenchymal or meningeal enhancement. No intracranial mass lesions.  No destructive osseous lesions.   No acute intracranial hemorrhage. No extra-axial fluid collection. No abnormally restricted diffusion to suggest recent infarction.   Nonspecific white matter FLAIR signal increase most likely related to mild chronic small vessel ischemic change.   Moderate paranasal sinus/ethmoid mucosal inflammatory changes. Right-sided nonspecific mastoid fluid. Orbital contents unremarkable.       1.  No acute intracranial abnormality. Unremarkable brain.     MACRO: None   Signed by: Kodak Castrejon 2/11/2025 7:24 PM Dictation workstation:   [id number]    XR chest 2 views    Result Date: 2/11/2025  Interpreted By:  Roxanna Diego, STUDY: XR CHEST 2 VIEWS;  2/11/2025 10:16 am   INDICATION: Signs/Symptoms:prior left retrocardiac opacity.   COMPARISON: 09/11/2011   ACCESSION NUMBER(S): [id number]   ORDERING CLINICIAN: LEONIDAS MARTINO   FINDINGS: Heart is normal in size.   There is a suggestion of some patchiness at the right lung base. There is no obvious pleural fluid.   The mediastinum and bones are unremarkable.   COMPARISON OF FINDING: The increased markings at the left base were not obvious previously.       Question of left basilar infiltrate or atelectasis.   MACRO: none   Signed by: Roxanna Diego 2/11/2025 10:22 AM Dictation workstation:   [id number]    CT head wo IV contrast    Result Date: 2/11/2025  Interpreted By:  Savannah Tipton, STUDY: CT HEAD WO IV CONTRAST; CT VENOGRAM HEAD W AND WO IV CONTRAST AND POST PROCEDURE;  2/10/2025 11:28 pm; 2/11/2025 12:22 am   INDICATION: Signs/Symptoms:headache, subjective visual change; Signs/Symptoms:Concern for right transverse.   COMPARISON: None.   ACCESSION NUMBER(S):  [id number]; [id number]   ORDERING CLINICIAN: BETTE JARA   TECHNIQUE: Axial noncontrast CT images of the head. Contrast-enhanced CT venogram.   FINDINGS: BRAIN PARENCHYMA: On the unenhanced imaging there is hyperdense attenuation within the posterosuperior sagittal sinus as well as the straight sinus, confluence and predominantly the right transverse sinus extending into the right sigmoid sinus. Findings are consistent with acute dural sinus thrombosis.   Gray-white matter interfaces are preserved. No mass effect or midline shift.   HEMORRHAGE: No acute intracranial hemorrhage. VENTRICLES and EXTRA-AXIAL SPACES: Normal size. EXTRACRANIAL SOFT TISSUES: Within normal limits. PARANASAL SINUSES/MASTOIDS: Mucosal thickening of the right maxillary sinus. Fluid throughout the left maxillary sinus, left ethmoid air cells on the left frontal sinus. CALVARIUM: No depressed skull fracture. No destructive osseous lesion.   Filling defect following contrast administration noted in the posterior superior sagittal sinus extending into the confluence. There is no significant contrast opacification within the straight sinus, the right transverse sinus or the right sigmoid sinus. The left transverse sinus is slightly diminutive which could be developmental versus related to partial filling defects. The internal cerebral veins and vein of Conor appears patent.   OTHER FINDINGS: None.       Hyperdense thrombus on unenhanced imaging with corresponding filling defects on CT venogram noted within the posterior superior sagittal sinus, the straight sinus, the right transverse sinus and the right sigmoid sinus consistent with extensive posterior dural venous thrombosis. Diminutive appearance of the left transverse sinus which may be developmental.   No evidence of hemorrhagic infarct.   MACRO: Savannah Tipton discussed the significance and urgency of this critical finding by telephone with Babak Elliott on 2/11/2025 at 1:31 am.   (**-RCF-**) Findings:  See findings.   Signed by: Savannah Tipton 2/11/2025 1:37 AM Dictation workstation:   [id number]    CT venogram head w and wo iv contrast    Result Date: 2/11/2025  Interpreted By:  Savannah Tipton, STUDY: CT HEAD WO IV CONTRAST; CT VENOGRAM HEAD W AND WO IV CONTRAST AND POST PROCEDURE;  2/10/2025 11:28 pm; 2/11/2025 12:22 am   INDICATION: Signs/Symptoms:headache, subjective visual change; Signs/Symptoms:Concern for right transverse.   COMPARISON: None.   ACCESSION NUMBER(S): [id number]; [id number]   ORDERING CLINICIAN: BETTE JARA   TECHNIQUE: Axial noncontrast CT images of the head. Contrast-enhanced CT venogram.   FINDINGS: BRAIN PARENCHYMA: On the unenhanced imaging there is hyperdense attenuation within the posterosuperior sagittal sinus as well as the straight sinus, confluence and predominantly the right transverse sinus extending into the right sigmoid sinus. Findings are consistent with acute dural sinus thrombosis.   Gray-white matter interfaces are preserved. No mass effect or midline shift.   HEMORRHAGE: No acute intracranial hemorrhage. VENTRICLES and EXTRA-AXIAL SPACES: Normal size. EXTRACRANIAL SOFT TISSUES: Within normal limits. PARANASAL SINUSES/MASTOIDS: Mucosal thickening of the right maxillary sinus. Fluid throughout the left maxillary sinus, left ethmoid air cells on the left frontal sinus. CALVARIUM: No depressed skull fracture. No destructive osseous lesion.   Filling defect following contrast administration noted in the posterior superior sagittal sinus extending into the confluence. There is no significant contrast opacification within the straight sinus, the right transverse sinus or the right sigmoid sinus. The left transverse sinus is slightly diminutive which could be developmental versus related to partial filling defects. The internal cerebral veins and vein of Conor appears patent.   OTHER FINDINGS: None.       Hyperdense thrombus on unenhanced imaging with  corresponding filling defects on CT venogram noted within the posterior superior sagittal sinus, the straight sinus, the right transverse sinus and the right sigmoid sinus consistent with extensive posterior dural venous thrombosis. Diminutive appearance of the left transverse sinus which may be developmental.   No evidence of hemorrhagic infarct.   MACRO: Savannah Tipton discussed the significance and urgency of this critical finding by telephone with Babak Elliott on 2/11/2025 at 1:31 am.  (**-RCF-**) Findings:  See findings.   Signed by: Savannah Tipton 2/11/2025 1:37 AM Dictation workstation:   [id number]    XR chest 2 views    Result Date: 1/31/2025  * * *Final Report* * * DATE OF EXAM: Jan 31 2025  6:55PM   VHX   [id number]  -  XR CHEST 2V FRONTAL/LAT  / ACCESSION #  [id number] PROCEDURE REASON: Cough      * * * * Physician Interpretation * * * *  EXAMINATION:  CHEST RADIOGRAPH (2 VIEW FRONTAL & LATERAL) CLINICAL HISTORY: Cough MQ:  [id number] EXAM DATE/TIME:  1/31/2025 6:55 PM COMPARISON:  No relevant prior studies available. RESULT: Lines, tubes, and devices:  None. Lungs and pleura:  Concern for left retrocardiac airspace opacity Cardiomediastinal silhouette:  Normal cardiomediastinal silhouette. Bones and soft tissues:  Unremarkable.    IMPRESSION: Concern for left retrocardiac infiltrate. 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     I recommend   ? Hematology Consultation: Evaluate for underlying   hypercoagulation state ( including D-dimer, antiphospholipid Ab)   ? Blood Pressure Monitoring.  ? Supportive Therapy:  IV fluids  To consider RBC transfusion if Okay by hematology.     The patient will benefit from LP tomorrow to evaluate for CSF infection and to measure the CSF opening pressure.     To consult with neuro-ophthalmology afterward     Case was discussed with Stroke Fellow at [facility] [place] and with Dr. Chen.                   I spent 80 minutes in the professional and overall care of this patient.      Soila Wasserman MD

## 2025-02-12 NOTE — ASSESSMENT & PLAN NOTE
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     I recommend   ? Hematology Consultation: Evaluate for underlying  hypercoagulation state ( including D-dimer, antiphospholipid Ab)   ? Blood Pressure Monitoring.  ? Supportive Therapy:  IV fluids  To consider RBC transfusion if Okay by hematology.     The patient will benefit from LP tomorrow to evaluate for CSF infection and to measure the CSF opening pressure.     To consult with neuro-ophthalmology afterward     [place] [facility] [place].

## 2025-02-13 LAB
ALBUMIN SERPL BCP-MCNC: 3 G/DL (ref 3.4–5)
ANA SER QL HEP2 SUBST: NEGATIVE
ANION GAP SERPL CALC-SCNC: 12 MMOL/L (ref 10–20)
APTT PPP: 47 SECONDS (ref 27–38)
BASOPHILS # BLD AUTO: 0.04 X10*3/UL (ref 0–0.1)
BASOPHILS NFR BLD AUTO: 0.3 %
BUN SERPL-MCNC: 5 MG/DL (ref 6–23)
CALCIUM SERPL-MCNC: 8.5 MG/DL (ref 8.6–10.6)
CHLORIDE SERPL-SCNC: 101 MMOL/L (ref 98–107)
CO2 SERPL-SCNC: 29 MMOL/L (ref 21–32)
CREAT SERPL-MCNC: 0.38 MG/DL (ref 0.5–1.05)
CYTOPLASMIC PATTERN: PRESENT
DRVVT SCREEN TO CONFIRM RATIO: 1 RATIO
DRVVT/DRVVT CFM NRMLZD PPP-RTO: 1.03 RATIO
DRVVT/DRVVT CFM P DOAC NEUT NORM PPP-RTO: 0.97 RATIO
EGFRCR SERPLBLD CKD-EPI 2021: >90 ML/MIN/1.73M*2
EOSINOPHIL # BLD AUTO: 0.01 X10*3/UL (ref 0–0.7)
EOSINOPHIL NFR BLD AUTO: 0.1 %
ERYTHROCYTE [DISTWIDTH] IN BLOOD BY AUTOMATED COUNT: 24.2 % (ref 11.5–14.5)
GLUCOSE SERPL-MCNC: 95 MG/DL (ref 74–99)
HCT VFR BLD AUTO: 26.8 % (ref 36–46)
HEMOGLOBIN A2: 2.3 % (ref 2–3.5)
HEMOGLOBIN A: 97.1 % (ref 95.8–98)
HEMOGLOBIN F: 0.6 % (ref 0–2)
HEMOGLOBIN IDENTIFICATION INTERPRETATION: NORMAL
HGB BLD-MCNC: 7.3 G/DL (ref 12–16)
HGB RETIC QN: 19 PG (ref 28–38)
HOLD SPECIMEN: NORMAL
HYPOCHROMIA BLD QL SMEAR: NORMAL
IMM GRANULOCYTES # BLD AUTO: 0.19 X10*3/UL (ref 0–0.7)
IMM GRANULOCYTES NFR BLD AUTO: 1.3 % (ref 0–0.9)
IMMATURE RETIC FRACTION: 49.8 %
INR PPP: 1.1 (ref 0.9–1.1)
LA 2 SCREEN W REFLEX-IMP: NORMAL
LEGIONELLA AG UR QL: NEGATIVE
LYMPHOCYTES # BLD AUTO: 1.89 X10*3/UL (ref 1.2–4.8)
LYMPHOCYTES NFR BLD AUTO: 12.9 %
MAGNESIUM SERPL-MCNC: 2.5 MG/DL (ref 1.6–2.4)
MCH RBC QN AUTO: 18.2 PG (ref 26–34)
MCHC RBC AUTO-ENTMCNC: 27.2 G/DL (ref 32–36)
MCV RBC AUTO: 67 FL (ref 80–100)
MONOCYTES # BLD AUTO: 0.96 X10*3/UL (ref 0.1–1)
MONOCYTES NFR BLD AUTO: 6.6 %
NEUTROPHILS # BLD AUTO: 11.54 X10*3/UL (ref 1.2–7.7)
NEUTROPHILS NFR BLD AUTO: 78.8 %
NORMALIZED SCT PPP-RTO: 0.77 RATIO
NRBC BLD-RTO: 0.3 /100 WBCS (ref 0–0)
OVALOCYTES BLD QL SMEAR: NORMAL
PATH REVIEW-HGB IDENTIFICATION: NORMAL
PHOSPHATE SERPL-MCNC: 3.4 MG/DL (ref 2.5–4.9)
PLATELET # BLD AUTO: 530 X10*3/UL (ref 150–450)
POLYCHROMASIA BLD QL SMEAR: NORMAL
POTASSIUM SERPL-SCNC: 3.2 MMOL/L (ref 3.5–5.3)
PROTHROMBIN TIME: 12.7 SECONDS (ref 9.8–12.8)
RBC # BLD AUTO: 4.01 X10*6/UL (ref 4–5.2)
RBC MORPH BLD: NORMAL
RETICS #: 0.08 X10*6/UL (ref 0.02–0.08)
RETICS/RBC NFR AUTO: 2.2 % (ref 0.5–2)
S PNEUM AG UR QL: NEGATIVE
SILICA CLOTTING TIME CONFIRMATION: 0.99 RATIO
SILICA CLOTTING TIME SCREEN: 0.75 RATIO
SODIUM SERPL-SCNC: 139 MMOL/L (ref 136–145)
TARGETS BLD QL SMEAR: NORMAL
THYROGLOB AB SERPL-ACNC: <0.9 IU/ML (ref 0–4)
UFH PPP CHRO-ACNC: 0.3 IU/ML
VANCOMYCIN SERPL-MCNC: 15.1 UG/ML (ref 5–20)
WBC # BLD AUTO: 14.6 X10*3/UL (ref 4.4–11.3)

## 2025-02-13 PROCEDURE — 36415 COLL VENOUS BLD VENIPUNCTURE: CPT

## 2025-02-13 PROCEDURE — 83735 ASSAY OF MAGNESIUM: CPT

## 2025-02-13 PROCEDURE — 2500000004 HC RX 250 GENERAL PHARMACY W/ HCPCS (ALT 636 FOR OP/ED)

## 2025-02-13 PROCEDURE — 2500000005 HC RX 250 GENERAL PHARMACY W/O HCPCS

## 2025-02-13 PROCEDURE — 85520 HEPARIN ASSAY: CPT

## 2025-02-13 PROCEDURE — 83020 HEMOGLOBIN ELECTROPHORESIS: CPT | Performed by: PATHOLOGY

## 2025-02-13 PROCEDURE — 85610 PROTHROMBIN TIME: CPT

## 2025-02-13 PROCEDURE — 2500000001 HC RX 250 WO HCPCS SELF ADMINISTERED DRUGS (ALT 637 FOR MEDICARE OP)

## 2025-02-13 PROCEDURE — 87449 NOS EACH ORGANISM AG IA: CPT

## 2025-02-13 PROCEDURE — 80069 RENAL FUNCTION PANEL: CPT

## 2025-02-13 PROCEDURE — 2020000001 HC ICU ROOM DAILY

## 2025-02-13 PROCEDURE — 80202 ASSAY OF VANCOMYCIN: CPT

## 2025-02-13 PROCEDURE — 85025 COMPLETE CBC W/AUTO DIFF WBC: CPT

## 2025-02-13 PROCEDURE — 99222 1ST HOSP IP/OBS MODERATE 55: CPT

## 2025-02-13 PROCEDURE — 84425 ASSAY OF VITAMIN B-1: CPT

## 2025-02-13 PROCEDURE — 87899 AGENT NOS ASSAY W/OPTIC: CPT

## 2025-02-13 PROCEDURE — 85045 AUTOMATED RETICULOCYTE COUNT: CPT

## 2025-02-13 PROCEDURE — 83021 HEMOGLOBIN CHROMOTOGRAPHY: CPT

## 2025-02-13 RX ORDER — POTASSIUM CHLORIDE 1.5 G/1.58G
40 POWDER, FOR SOLUTION ORAL ONCE
Status: COMPLETED | OUTPATIENT
Start: 2025-02-13 | End: 2025-02-13

## 2025-02-13 RX ORDER — ONDANSETRON HYDROCHLORIDE 2 MG/ML
8 INJECTION, SOLUTION INTRAVENOUS ONCE
Status: COMPLETED | OUTPATIENT
Start: 2025-02-13 | End: 2025-02-13

## 2025-02-13 RX ORDER — MAGNESIUM SULFATE 1 G/100ML
1 INJECTION INTRAVENOUS ONCE
Status: COMPLETED | OUTPATIENT
Start: 2025-02-13 | End: 2025-02-13

## 2025-02-13 RX ORDER — HEPARIN SODIUM 10000 [USP'U]/100ML
0-4500 INJECTION, SOLUTION INTRAVENOUS CONTINUOUS
Status: DISPENSED | OUTPATIENT
Start: 2025-02-13 | End: 2025-02-14

## 2025-02-13 RX ORDER — HYDROMORPHONE HYDROCHLORIDE 1 MG/ML
0.2 INJECTION, SOLUTION INTRAMUSCULAR; INTRAVENOUS; SUBCUTANEOUS
Status: DISCONTINUED | OUTPATIENT
Start: 2025-02-13 | End: 2025-02-14

## 2025-02-13 RX ORDER — DIPHENHYDRAMINE HYDROCHLORIDE 50 MG/ML
50 INJECTION INTRAMUSCULAR; INTRAVENOUS EVERY 5 MIN PRN
Status: DISCONTINUED | OUTPATIENT
Start: 2025-02-13 | End: 2025-02-15

## 2025-02-13 RX ORDER — PANTOPRAZOLE SODIUM 40 MG/10ML
40 INJECTION, POWDER, LYOPHILIZED, FOR SOLUTION INTRAVENOUS DAILY
Status: DISCONTINUED | OUTPATIENT
Start: 2025-02-13 | End: 2025-02-17 | Stop reason: HOSPADM

## 2025-02-13 RX ORDER — DIPHENHYDRAMINE HYDROCHLORIDE 50 MG/ML
25 INJECTION INTRAMUSCULAR; INTRAVENOUS ONCE
Status: COMPLETED | OUTPATIENT
Start: 2025-02-13 | End: 2025-02-13

## 2025-02-13 RX ADMIN — HYDROMORPHONE HYDROCHLORIDE 0.2 MG: 1 INJECTION, SOLUTION INTRAMUSCULAR; INTRAVENOUS; SUBCUTANEOUS at 04:35

## 2025-02-13 RX ADMIN — IRON SUCROSE 300 MG: 20 INJECTION, SOLUTION INTRAVENOUS at 09:03

## 2025-02-13 RX ADMIN — THIAMINE HYDROCHLORIDE 500 MG: 100 INJECTION, SOLUTION INTRAMUSCULAR; INTRAVENOUS at 09:04

## 2025-02-13 RX ADMIN — Medication 5 MG: at 21:19

## 2025-02-13 RX ADMIN — THIAMINE HYDROCHLORIDE 500 MG: 100 INJECTION, SOLUTION INTRAMUSCULAR; INTRAVENOUS at 21:19

## 2025-02-13 RX ADMIN — FOLIC ACID 1 MG: 1 TABLET ORAL at 09:04

## 2025-02-13 RX ADMIN — ACETAZOLAMIDE 500 MG: 250 TABLET ORAL at 21:20

## 2025-02-13 RX ADMIN — POTASSIUM CHLORIDE 40 MEQ: 1.5 POWDER, FOR SOLUTION ORAL at 09:04

## 2025-02-13 RX ADMIN — DIPHENHYDRAMINE HYDROCHLORIDE 25 MG: 50 INJECTION INTRAMUSCULAR; INTRAVENOUS at 14:25

## 2025-02-13 RX ADMIN — HYDROMORPHONE HYDROCHLORIDE 0.2 MG: 1 INJECTION, SOLUTION INTRAMUSCULAR; INTRAVENOUS; SUBCUTANEOUS at 18:16

## 2025-02-13 RX ADMIN — PANTOPRAZOLE SODIUM 40 MG: 40 INJECTION, POWDER, FOR SOLUTION INTRAVENOUS at 09:03

## 2025-02-13 RX ADMIN — HYDROMORPHONE HYDROCHLORIDE 0.2 MG: 1 INJECTION, SOLUTION INTRAMUSCULAR; INTRAVENOUS; SUBCUTANEOUS at 15:13

## 2025-02-13 RX ADMIN — ACETAMINOPHEN 650 MG: 325 TABLET ORAL at 11:27

## 2025-02-13 RX ADMIN — VANCOMYCIN HYDROCHLORIDE 1750 MG: 5 INJECTION, POWDER, LYOPHILIZED, FOR SOLUTION INTRAVENOUS at 03:30

## 2025-02-13 RX ADMIN — ACETAZOLAMIDE 500 MG: 250 TABLET ORAL at 09:05

## 2025-02-13 RX ADMIN — SODIUM CHLORIDE, POTASSIUM CHLORIDE, SODIUM LACTATE AND CALCIUM CHLORIDE 500 ML: 600; 310; 30; 20 INJECTION, SOLUTION INTRAVENOUS at 13:57

## 2025-02-13 RX ADMIN — ONDANSETRON 8 MG: 2 INJECTION INTRAMUSCULAR; INTRAVENOUS at 14:26

## 2025-02-13 RX ADMIN — SENNOSIDES AND DOCUSATE SODIUM 2 TABLET: 50; 8.6 TABLET ORAL at 21:20

## 2025-02-13 RX ADMIN — HYDROMORPHONE HYDROCHLORIDE 0.2 MG: 1 INJECTION, SOLUTION INTRAMUSCULAR; INTRAVENOUS; SUBCUTANEOUS at 21:19

## 2025-02-13 RX ADMIN — POTASSIUM CHLORIDE 40 MEQ: 1.5 POWDER, FOR SOLUTION ORAL at 02:54

## 2025-02-13 RX ADMIN — CEFTRIAXONE SODIUM 2 G: 2 INJECTION, SOLUTION INTRAVENOUS at 09:03

## 2025-02-13 RX ADMIN — HEPARIN SODIUM 1000 UNITS/HR: 10000 INJECTION, SOLUTION INTRAVENOUS at 19:55

## 2025-02-13 RX ADMIN — VANCOMYCIN HYDROCHLORIDE 1750 MG: 5 INJECTION, POWDER, LYOPHILIZED, FOR SOLUTION INTRAVENOUS at 18:17

## 2025-02-13 RX ADMIN — MAGNESIUM SULFATE HEPTAHYDRATE 1 G: 10 INJECTION, SOLUTION INTRAVENOUS at 14:26

## 2025-02-13 RX ADMIN — HYDROMORPHONE HYDROCHLORIDE 0.2 MG: 1 INJECTION, SOLUTION INTRAMUSCULAR; INTRAVENOUS; SUBCUTANEOUS at 00:33

## 2025-02-13 RX ADMIN — HYDROMORPHONE HYDROCHLORIDE 0.2 MG: 1 INJECTION, SOLUTION INTRAMUSCULAR; INTRAVENOUS; SUBCUTANEOUS at 09:03

## 2025-02-13 RX ADMIN — CEFTRIAXONE SODIUM 2 G: 2 INJECTION, SOLUTION INTRAVENOUS at 21:29

## 2025-02-13 RX ADMIN — ACETAZOLAMIDE 500 MG: 250 TABLET ORAL at 00:15

## 2025-02-13 ASSESSMENT — PAIN SCALES - GENERAL
PAINLEVEL_OUTOF10: 0 - NO PAIN
PAINLEVEL_OUTOF10: 7
PAINLEVEL_OUTOF10: 7
PAINLEVEL_OUTOF10: 4
PAINLEVEL_OUTOF10: 7
PAINLEVEL_OUTOF10: 8
PAINLEVEL_OUTOF10: 7
PAINLEVEL_OUTOF10: 0 - NO PAIN

## 2025-02-13 ASSESSMENT — PAIN - FUNCTIONAL ASSESSMENT
PAIN_FUNCTIONAL_ASSESSMENT: 0-10

## 2025-02-13 ASSESSMENT — ENCOUNTER SYMPTOMS
EYE PAIN: 0
WHEEZING: 0
HEADACHES: 1
PALPITATIONS: 0
COUGH: 1
NAUSEA: 0
ALLERGIC/IMMUNOLOGIC NEGATIVE: 1
EYE REDNESS: 0
PSYCHIATRIC NEGATIVE: 1
FEVER: 0
HEMATOLOGIC/LYMPHATIC NEGATIVE: 1
PHOTOPHOBIA: 0
VOMITING: 0
MUSCULOSKELETAL NEGATIVE: 1
ENDOCRINE NEGATIVE: 1
EYE ITCHING: 0
DIZZINESS: 1
EYE DISCHARGE: 0
SHORTNESS OF BREATH: 0

## 2025-02-13 ASSESSMENT — PAIN DESCRIPTION - LOCATION
LOCATION: HEAD

## 2025-02-13 NOTE — PROGRESS NOTES
"Shreya Garces is a 34 y.o. female on day 1 of admission presenting with Dural venous sinus thrombosis ([facility]).      Subjective       The patient's condition is worsening she has been more lethargic complaining of increased headache mainly right more than left bifrontal with blurred vision on the right eye.  Her heart rate has been decreasing with an associated hypertension due to Cushing's reflex.    She refused spinal tap for further evaluation for possible meningitis.  She continues to be on antibiotics and heparin.       Objective     Last Recorded Vitals  Blood pressure (!) 164/110, pulse (!) 44, temperature 36.5 °C (97.7 °F), resp. rate (!) 9, height 1.626 m (5' 4\"), weight 64 kg (141 lb 1.5 oz), SpO2 95%.      Physical Exam  Neurological Exam   She appears lethargic, in active distress, no nuchal rigidity.   No diplopia.  She was in discomfort .  She has hyperreflexia bilateral upper and lower extremities, positive Babinski sign on the right no focal weakness.  He continues to be      NIH Stroke Scale  1A. Level of Consciousness: Alert, Keenly Responsive  1B. Ask Month and Age: Both Questions Right  1C. Blink Eyes & Squeeze Hands: Performs Both Tasks  2. Best Gaze: Normal  3. Visual: No Visual Loss  4. Facial Palsy: Normal Symmetrical Movements  5A. Motor - Left Arm: No Drift  5B. Motor - Right Arm: Drift  6A. Motor - Left Leg: No Drift  6B. Motor - Right Leg: No Drift  7. Limb Ataxia: Absent  8. Sensory Loss: Normal  9. Best Language: No Aphasia  10. Dysarthria: Normal  11. Extinction and Inattention: No Abnormality  NIH Stroke Scale: 1           Ricky Coma Scale  Best Eye Response: Spontaneous  Best Verbal Response: Oriented  Best Motor Response: Follows commands  Pritchett Coma Scale Score: 15                I have personally reviewed the patient's lab work and results for the last year:  Admission on 02/12/2025   Component Date Value Ref Range Status    Glucose 02/12/2025 94  74 - 99 mg/dL Final    " Sodium 02/12/2025 140  136 - 145 mmol/L Final    Potassium 02/12/2025 3.9  3.5 - 5.3 mmol/L Final    Chloride 02/12/2025 103  98 - 107 mmol/L Final    Bicarbonate 02/12/2025 29  21 - 32 mmol/L Final    Anion Gap 02/12/2025 12  10 - 20 mmol/L Final    Urea Nitrogen 02/12/2025 6  6 - 23 mg/dL Final    Creatinine 02/12/2025 0.43 (L)  0.50 - 1.05 mg/dL Final    eGFR 02/12/2025 >90  >60 mL/min/1.73m*2 Final    Calculations of estimated GFR are performed using the 2021 CKD-EPI Study Refit equation without the race variable for the IDMS-Traceable creatinine methods.  https://jasn.asnjournals.org/content/early/2021/09/22/ASN.7338000994    Calcium 02/12/2025 8.8  8.6 - 10.6 mg/dL Final    Phosphorus 02/12/2025 3.9  2.5 - 4.9 mg/dL Final    The performance characteristics of phosphorus testing in heparinized plasma have been validated by the individual  laboratory site where testing is performed. Testing on heparinized plasma is not approved by the FDA; however, such approval is not necessary.    Albumin 02/12/2025 3.1 (L)  3.4 - 5.0 g/dL Final    WBC 02/12/2025 15.7 (H)  4.4 - 11.3 x10*3/uL Final    nRBC 02/12/2025 0.1 (H)  0.0 - 0.0 /100 WBCs Final    RBC 02/12/2025 4.18  4.00 - 5.20 x10*6/uL Final    Hemoglobin 02/12/2025 7.6 (L)  12.0 - 16.0 g/dL Final    Hematocrit 02/12/2025 26.9 (L)  36.0 - 46.0 % Final    MCV 02/12/2025 64 (L)  80 - 100 fL Final    MCH 02/12/2025 18.2 (L)  26.0 - 34.0 pg Final    MCHC 02/12/2025 28.3 (L)  32.0 - 36.0 g/dL Final    RDW 02/12/2025 24.3 (H)  11.5 - 14.5 % Final    Platelets 02/12/2025 540 (H)  150 - 450 x10*3/uL Final    Magnesium 02/12/2025 1.80  1.60 - 2.40 mg/dL Final    WBC 02/13/2025 14.6 (H)  4.4 - 11.3 x10*3/uL Final    nRBC 02/13/2025 0.3 (H)  0.0 - 0.0 /100 WBCs Final    RBC 02/13/2025 4.01  4.00 - 5.20 x10*6/uL Final    Hemoglobin 02/13/2025 7.3 (L)  12.0 - 16.0 g/dL Final    Hematocrit 02/13/2025 26.8 (L)  36.0 - 46.0 % Final    MCV 02/13/2025 67 (L)  80 - 100 fL Final     MCH 02/13/2025 18.2 (L)  26.0 - 34.0 pg Final    MCHC 02/13/2025 27.2 (L)  32.0 - 36.0 g/dL Final    RDW 02/13/2025 24.2 (H)  11.5 - 14.5 % Final    Platelets 02/13/2025 530 (H)  150 - 450 x10*3/uL Final    Neutrophils % 02/13/2025 78.8  40.0 - 80.0 % Final    Immature Granulocytes %, Automated 02/13/2025 1.3 (H)  0.0 - 0.9 % Final    Immature Granulocyte Count (IG) includes promyelocytes, myelocytes and metamyelocytes but does not include bands. Percent differential counts (%) should be interpreted in the context of the absolute cell counts (cells/UL).    Lymphocytes % 02/13/2025 12.9  13.0 - 44.0 % Final    Monocytes % 02/13/2025 6.6  2.0 - 10.0 % Final    Eosinophils % 02/13/2025 0.1  0.0 - 6.0 % Final    Basophils % 02/13/2025 0.3  0.0 - 2.0 % Final    Neutrophils Absolute 02/13/2025 11.54 (H)  1.20 - 7.70 x10*3/uL Final    Percent differential counts (%) should be interpreted in the context of the absolute cell counts (cells/uL).    Immature Granulocytes Absolute, Au* 02/13/2025 0.19  0.00 - 0.70 x10*3/uL Final    Lymphocytes Absolute 02/13/2025 1.89  1.20 - 4.80 x10*3/uL Final    Monocytes Absolute 02/13/2025 0.96  0.10 - 1.00 x10*3/uL Final    Eosinophils Absolute 02/13/2025 0.01  0.00 - 0.70 x10*3/uL Final    Basophils Absolute 02/13/2025 0.04  0.00 - 0.10 x10*3/uL Final    Automated WBC differential has been confirmed by manual smear.    Glucose 02/13/2025 95  74 - 99 mg/dL Final    Sodium 02/13/2025 139  136 - 145 mmol/L Final    Potassium 02/13/2025 3.2 (L)  3.5 - 5.3 mmol/L Final    Chloride 02/13/2025 101  98 - 107 mmol/L Final    Bicarbonate 02/13/2025 29  21 - 32 mmol/L Final    Anion Gap 02/13/2025 12  10 - 20 mmol/L Final    Urea Nitrogen 02/13/2025 5 (L)  6 - 23 mg/dL Final    Creatinine 02/13/2025 0.38 (L)  0.50 - 1.05 mg/dL Final    eGFR 02/13/2025 >90  >60 mL/min/1.73m*2 Final    Calculations of estimated GFR are performed using the 2021 CKD-EPI Study Refit equation without the race variable for  "the IDMS-Traceable creatinine methods.  https://jasn.asnjournals.org/content/early/2021/09/22/ASN.3407066858    Calcium 02/13/2025 8.5 (L)  8.6 - 10.6 mg/dL Final    Phosphorus 02/13/2025 3.4  2.5 - 4.9 mg/dL Final    The performance characteristics of phosphorus testing in heparinized plasma have been validated by the individual  laboratory site where testing is performed. Testing on heparinized plasma is not approved by the FDA; however, such approval is not necessary.    Albumin 02/13/2025 3.0 (L)  3.4 - 5.0 g/dL Final    Retic % 02/13/2025 2.2 (H)  0.5 - 2.0 % Final    Retic Absolute 02/13/2025 0.084 (H)  0.018 - 0.083 x10*6/uL Final    Reticulocyte Hemoglobin 02/13/2025 19 (L)  28 - 38 pg Final    Immature Retic fraction 02/13/2025 49.8 (H)  <=16.0 % Final    Reticulocytes are measured based on a fluorescent technique. The IRF, or immature reticulocyte fraction, is the percent of reticulocytes that show medium (MFR) or high (HFR) fluorescence.  This value can be used to assess the relative maturity of the reticulocyte population in response to anemia. The \"shift reticulocytes\" are not measured by this technique, eliminating the need for their correction in the reticulocyte index.    Protime 02/13/2025 12.7  9.8 - 12.8 seconds Final    INR 02/13/2025 1.1  0.9 - 1.1 Final    aPTT 02/13/2025 47 (H)  27 - 38 seconds Final    Magnesium 02/13/2025 2.50 (H)  1.60 - 2.40 mg/dL Final    Heparin Unfractionated 02/12/2025 0.3  See Comment Below for Therapeutic Ranges IU/mL Final    Heparin Unfractionated 02/13/2025 0.3  See Comment Below for Therapeutic Ranges IU/mL Final    RBC Morphology 02/13/2025 See Below   Final    Polychromasia 02/13/2025 Mild   Final    Hypochromia 02/13/2025 Mild   Final    Target Cells 02/13/2025 Few   Final    Ovalocytes 02/13/2025 Few   Final   No results displayed because visit has over 200 results.      Admission on 06/10/2024, Discharged on 06/10/2024   Component Date Value Ref Range " Status    Lipase 06/10/2024 10  9 - 82 U/L Final    Ventricular Rate 06/10/2024 54  BPM Final    Atrial Rate 06/10/2024 54  BPM Final    OH Interval 06/10/2024 162  ms Final    QRS Duration 06/10/2024 94  ms Final    QT Interval 06/10/2024 442  ms Final    QTC Calculation(Bazett) 06/10/2024 419  ms Final    P Axis 06/10/2024 41  degrees Final    R Axis 06/10/2024 57  degrees Final    T Axis 06/10/2024 36  degrees Final    QRS Count 06/10/2024 9  beats Final    Q Onset 06/10/2024 215  ms Final    P Onset 06/10/2024 134  ms Final    P Offset 06/10/2024 182  ms Final    T Offset 06/10/2024 436  ms Final    QTC Fredericia 06/10/2024 426  ms Final    Glucose 06/10/2024 97  74 - 99 mg/dL Final    Sodium 06/10/2024 136  136 - 145 mmol/L Final    Potassium 06/10/2024 4.1  3.5 - 5.3 mmol/L Final    MILD HEMOLYSIS DETECTED. The result may be falsely elevated due to hemolysis or other interferents. Clinical correlation is recommended. Repeat testing may be considered.    Chloride 06/10/2024 102  98 - 107 mmol/L Final    Bicarbonate 06/10/2024 24  21 - 32 mmol/L Final    Anion Gap 06/10/2024 14  10 - 20 mmol/L Final    Urea Nitrogen 06/10/2024 14  6 - 23 mg/dL Final    Creatinine 06/10/2024 0.59  0.50 - 1.05 mg/dL Final    eGFR 06/10/2024 >90  >60 mL/min/1.73m*2 Final    Calculations of estimated GFR are performed using the 2021 CKD-EPI Study Refit equation without the race variable for the IDMS-Traceable creatinine methods.  https://jasn.asnjournals.org/content/early/2021/09/22/ASN.2495959012    Calcium 06/10/2024 9.2  8.6 - 10.3 mg/dL Final    Albumin 06/10/2024 4.7  3.4 - 5.0 g/dL Final    Alkaline Phosphatase 06/10/2024 117 (H)  33 - 110 U/L Final    Total Protein 06/10/2024 8.5 (H)  6.4 - 8.2 g/dL Final    AST 06/10/2024 39  9 - 39 U/L Final    MILD HEMOLYSIS DETECTED. The result may be falsely elevated due to hemolysis or other interferents. Clinical correlation is recommended. Repeat testing may be considered.     Bilirubin, Total 06/10/2024 1.3 (H)  0.0 - 1.2 mg/dL Final    ALT 06/10/2024 22  7 - 45 U/L Final    Patients treated with Sulfasalazine may generate falsely decreased results for ALT.    WBC 06/10/2024 11.9 (H)  4.4 - 11.3 x10*3/uL Final    nRBC 06/10/2024 0.0  0.0 - 0.0 /100 WBCs Final    RBC 06/10/2024 5.47 (H)  4.00 - 5.20 x10*6/uL Final    Hemoglobin 06/10/2024 9.8 (L)  12.0 - 16.0 g/dL Final    Hematocrit 06/10/2024 34.5 (L)  36.0 - 46.0 % Final    MCV 06/10/2024 63 (L)  80 - 100 fL Final    MCH 06/10/2024 17.9 (L)  26.0 - 34.0 pg Final    MCHC 06/10/2024 28.4 (L)  32.0 - 36.0 g/dL Final    RDW 06/10/2024 20.5 (H)  11.5 - 14.5 % Final    Platelets 06/10/2024 307  150 - 450 x10*3/uL Final    Neutrophils % 06/10/2024 84.8  40.0 - 80.0 % Final    Immature Granulocytes %, Automated 06/10/2024 0.4  0.0 - 0.9 % Final    Immature Granulocyte Count (IG) includes promyelocytes, myelocytes and metamyelocytes but does not include bands. Percent differential counts (%) should be interpreted in the context of the absolute cell counts (cells/UL).    Lymphocytes % 06/10/2024 8.3  13.0 - 44.0 % Final    Monocytes % 06/10/2024 5.9  2.0 - 10.0 % Final    Eosinophils % 06/10/2024 0.3  0.0 - 6.0 % Final    Basophils % 06/10/2024 0.3  0.0 - 2.0 % Final    Neutrophils Absolute 06/10/2024 10.07 (H)  1.20 - 7.70 x10*3/uL Final    Percent differential counts (%) should be interpreted in the context of the absolute cell counts (cells/uL).    Immature Granulocytes Absolute, Au* 06/10/2024 0.05  0.00 - 0.70 x10*3/uL Final    Lymphocytes Absolute 06/10/2024 0.98 (L)  1.20 - 4.80 x10*3/uL Final    Monocytes Absolute 06/10/2024 0.70  0.10 - 1.00 x10*3/uL Final    Eosinophils Absolute 06/10/2024 0.03  0.00 - 0.70 x10*3/uL Final    Basophils Absolute 06/10/2024 0.03  0.00 - 0.10 x10*3/uL Final    HCG, Urine 06/10/2024 NEGATIVE  NEGATIVE Final    Color, Urine 06/10/2024 Yellow  Light-Yellow, Yellow, Dark-Yellow Final    Appearance, Urine  06/10/2024 Clear  Clear Final    Specific Gravity, Urine 06/10/2024 1.042 (N)  1.005 - 1.035 Final    pH, Urine 06/10/2024 5.5  5.0, 5.5, 6.0, 6.5, 7.0, 7.5, 8.0 Final    Protein, Urine 06/10/2024 20 (TRACE)  NEGATIVE, 10 (TRACE), 20 (TRACE) mg/dL Final    Glucose, Urine 06/10/2024 OVER (4+) (A)  Normal mg/dL Final    Blood, Urine 06/10/2024 NEGATIVE  NEGATIVE Final    Ketones, Urine 06/10/2024 NEGATIVE  NEGATIVE mg/dL Final    Bilirubin, Urine 06/10/2024 NEGATIVE  NEGATIVE Final    Urobilinogen, Urine 06/10/2024 Normal  Normal mg/dL Final    Nitrite, Urine 06/10/2024 NEGATIVE  NEGATIVE Final    Leukocyte Esterase, Urine 06/10/2024 NEGATIVE  NEGATIVE Final    Extra Tube 06/10/2024 Hold for add-ons.   Final    Auto resulted.    WBC, Urine 06/10/2024 1-5  1-5, NONE /HPF Final    RBC, Urine 06/10/2024 >20 (A)  NONE, 1-2, 3-5 /HPF Final    Squamous Epithelial Cells, Urine 06/10/2024 1-9 (SPARSE)  Reference range not established. /HPF Final    Mucus, Urine 06/10/2024 1+  Reference range not established. /LPF Final    Extra Tube 06/10/2024 Hold for add-ons.   Final    Auto resulted.    RBC Morphology 06/10/2024 See Below   Final    Polychromasia 06/10/2024 Mild   Final    Ovalocytes 06/10/2024 Few   Final                  Assessment/Plan      Assessment & Plan  Dural venous sinus thrombosis ([facility]HS-HCC)  Central venous thrombosis with recent worsening of her condition with associated increased bradycardia, severe headache, right optic disc edema and Cushing reflex .   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   Infectious etiology could not be ruled out.  The patient refused all spinal tap    The patient will need to be transferred to tertiary care center for further care with the consideration of performing central venous thromboectomy and starting her on Diamox.      She was accepted at [facility] neuro ICU.  She will be followed up.      I spent 40 minutes in the professional and overall care of this patient.      Soila Wasserman MD

## 2025-02-13 NOTE — PROGRESS NOTES
[facility]  NEUROSCIENCE INTENSIVE CARE UNIT  DAILY PROGRESS NOTE       Patient Name: Shreya Garces   MRN: [id number]     Admit Date: 2025     : 1990 AGE: 34 y.o. GENDER: female        Subjective      Significant Events:  - Changed heparin gtt protocol this morning from low intensity -> high intensity     Objective   VITALS (24H):  Temp:  [35.9 °C (96.6 °F)-37.2 °C (99 °F)] 36.7 °C (98.1 °F)  Heart Rate:  [38-62] 48  Resp:  [9-15] 14  BP: (136-184)/() 145/88  INTAKE/OUTPUT:  Intake/Output Summary (Last 24 hours) at 2025 0606  Last data filed at 2025 0600  Gross per 24 hour   Intake 1401.67 ml   Output 1300 ml   Net 101.67 ml     VENT SETTINGS:        PHYSICAL EXAM:  NEURO:  - Alert, oriented x4, follows commands  - EOS, PERRL, EOMI, VFF  - VEGA 5/5 strength, no drift, no ataxia  CV:  - RRR on telemetry, NSR  RESP:  - No signs of resp distress  - Oxygen: Room air    :  - External catheter in place  SKIN:  - Intact    MEDICATIONS:  Scheduled: PRN: Continuous:   acetaZOLAMIDE, 500 mg, oral, BID  cefTRIAXone, 2 g, intravenous, q12h  folic acid, 1 mg, oral, Daily  heparin, 60 Units/kg, intravenous, Once  melatonin, 5 mg, oral, Nightly  polyethylene glycol, 17 g, oral, Daily  sennosides-docusate sodium, 2 tablet, oral, BID  thiamine, 500 mg, intravenous, TID  vancomycin, 1,750 mg, intravenous, q12h     PRN medications: acetaminophen, heparin, hydrALAZINE, HYDROmorphone, labetaloL, ondansetron **OR** ondansetron, vancomycin heparin, 0-4,000 Units/hr, Last Rate: 1,000 Units/hr (25 0400)  sodium chloride 0.9%, 75 mL/hr, Last Rate: 75 mL/hr (25 0400)         LAB RESULTS:  [unfilled]  Results from last 72 hours   Lab Units 25  0016 25  1940 25  1408 25  0433   WBC AUTO x10*3/uL 14.6* 15.7*   < > 14.9*   NRBC AUTO /100 WBCs 0.3* 0.1*   < > 0.0   RBC AUTO x10*6/uL 4.01 4.18   < > 3.98*   HEMOGLOBIN g/dL 7.3* 7.6*   < > 7.1*   HEMATOCRIT % 26.8*  26.9*   < > 25.8*   MCV fL 67* 64*   < > 65*   MCH pg 18.2* 18.2*   < > 17.8*   MCHC g/dL 27.2* 28.3*   < > 27.5*   RDW % 24.2* 24.3*   < > 24.1*   PLATELETS AUTO x10*3/uL 530* 540*   < > 538*   NEUTROS PCT AUTO % 78.8  --   --  75.5   IG PCT AUTO % 1.3*  --   --  0.7   LYMPHS PCT AUTO % 12.9  --   --  16.5   MONOS PCT AUTO % 6.6  --   --  6.9   EOS PCT AUTO % 0.1  --   --  0.1   BASOS PCT AUTO % 0.3  --   --  0.3   NEUTROS ABS x10*3/uL 11.54*  --   --  11.23*   IG AUTO x10*3/uL 0.19  --   --  0.11   LYMPHS ABS AUTO x10*3/uL 1.89  --   --  2.45   MONOS ABS AUTO x10*3/uL 0.96  --   --  1.03*   EOS ABS AUTO x10*3/uL 0.01  --   --  0.01   BASOS ABS AUTO x10*3/uL 0.04  --   --  0.04    < > = values in this interval not displayed.        IMAGING RESULTS:  CT head wo IV contrast   Final Result   1. Similar findings consistent with extensive dural venous thrombosis.   2. No evidence of large ischemic infarct or intraparenchymal   hemorrhage.   3. Similar paranasal sinus disease. The presence of fluid in the left   maxillary sinus could reflect acute sinusitis in the appropriate   clinical setting.                  I personally reviewed the images/study and I agree with the findings   as stated by Gordon Sweeney DO, PGY-3.        MACRO:   None        Signed by: Cynthia Maki 2/13/2025 5:50 AM   Dictation workstation:   [id number]             Assessment/Plan    34 year-old female with past medical history of prior lacunar infarct (7/2018), hypertension, hyperlipidemia, migraines, DUSTIN, and depression who presented to [facility] ED on 2/11/25 with 10-day history of headache. 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 Patient transferred to [facility] for further neurological management after developing worsening headache and vision loss after initiating heparin infusion.     Per chart review, patient presented to [facility] ED with 3-week history of flu-like symptoms and 1.5 weeks of right eye  blurry vision with constant frontal head pressure. Patient found to have superior sagittal to right sigmoid sinus CVST. On 2/12/25, patient with continued headache, worsened lethargy with agitation, and worsened vision loss concerning for Cushing's reflex. Decision made to transfer to [facility] for further monitoring, ophthalmology evaluation, and possible neurosurgical intervention for CVST removal.  Repeat CTH on arrival to [facility] 2/12 demonstrated no bleeding.    NEURO:  #CVST within posterior superior sagittal, straight, right transverse and right sigmoid sinus  #Prior lacunar infarcts  #Concern for meningitis  #Migraines  Assessment:  - Neurologically: see above  - See above history and significant events  - Bradycardic (since admission), hypertensive, bradypneic, decreased mentation vs. agitation  - CTH on arrival 2/12: no bleeding  Plan:  - NSU  - Low intensity heparin infusion with PRN bolus  - Continue Diamox 500mg BID for increased ICP  - No LP at this time, will continue empiric Abx for meningoencephalitis and follow BCx  - Folic acid 1mg, melatonin 5mg nightly  - Neuro Checks: Q1H  - Sedation: None  - Pain: acetaminophen PRN and hydromorphone PRN  - Nausea: ondansetron  - PT/OT/SLP    CARDIOVASCULAR:  #HTN  #HLD  #Bradycardia, asymptomatic  Assessment:  - ECHO 2/11 - LVEF 64% (Willams's biplane), normal right global systolic function, RVSP normal    Plan:  - Continue to monitor on telemetry  - BP goal: SBP >100, SBP <180    --> PRN: Labetalol and Hydralazine     RESPIRATORY:  #Bradypneic  Assessment:  - Concern for cushing reflex in setting of CVST  Plan:  - Continuous pulse oximetry   - O2 PRN to maintain SpO2 > 94%, wean as tolerated  - Incentive spirometry while awake    RENAL/:  #JAKE  Assessment:  - Baseline BUN/Cr: 7/0.43  Results from last 72 hours   Lab Units 02/13/25  0018 02/12/25  1940   BUN mg/dL 5* 6   CREATININE mg/dL 0.38* 0.43*       Plan:  - Monitor with daily  RFP    FEN/GI:  #JAKE  Assessment:  - Last BM Date:  (pta)  -   Plan:  - Monitor and replace electrolytes per protocol  - IVF: NS @ 75 mL/hr  - Diet: NPO   - Bowel Regimen: Docusate-Senna QD, BID and Miralax QD    ENDOCRINE:  #JAKE  Assessment:  Results from last 7 days   Lab Units 25  0018 25  0433 25  0630 02/10/25  2232   GLUCOSE mg/dL 95 94 96 138* 183*   SODIUM mmol/L 139 140 140 140 140        Plan:  - Accuchecks & ISS PRN     HEMATOLOGY:  #Microcytic hypoproliferative anemia  Assessment:  - Baseline Hgb: 9.8  - Baseline Plts: 307  - TIBC 2.7%  - Reticulocyte Production Index (RPI) 1.0 (inadequate repsonse)  - Corrected Reticulocyte Percentage 1.47%  Results from last 7 days   Lab Units 25  1408   HEMOGLOBIN g/dL 7.3* 7.6* 8.0*   HEMATOCRIT % 26.8* 26.9* 29.8*   PLATELETS AUTO x10*3/uL 530* 540* 358     Plan:  - Continue to monitor with daily CBC and Coag panel  - Resume Venofer 300mg IV for 1 additional dose (3 total on , , )  - Hemoglobin electrophoresis/identification sent per recommendation of Cardiology at OSH  - Peripheral smear sent  - Hypercoagulable workup pending  - Sent anti beta-2 glycoprotein    INFECTIOUS DISEASE:  #Concern for meningitis  #Leukocytosis, improving  Assessment:  Results from last 7 days   Lab Units 25  00125  1408   WBC AUTO x10*3/uL 14.6* 15.7* 13.1*    - Temp (24hrs), Av.6 °C (97.9 °F), Min:35.9 °C (96.6 °F), Max:37.2 °C (99 °F)   - Afebrile  Plan:  - Continue to monitor for s/sx of infection  - Pan culture for temperature > 38.4 C  - Continue Ceftriaxone 2g q12 ( - ), Vancomycin 1750mg BID ( - )  - Discuss LP for this morning     MUSCULOSKELETAL:  - No acute issues    SKIN:  - No acute issues  - Turns and skin care per NSU protocol    ACCESS:  - PIVx    PROPHYLAXIS:  - DVT Ppx: SCDs and Heparin gtt  - GI Ppx: Pantoprazole    RESTRAINTS:  Not  indicated/Patient does not meet criteria for restraints    Kendall Salazar MD  Neuroscience Intensive Care

## 2025-02-13 NOTE — PROGRESS NOTES
Shreya Garces is a 34 y.o. female on day 1 of admission presenting with Acute idiopathic CVST ([facility]).      Subjective   Today she reports continued headache. Denies visual complaints including TVOs or diplopia.        Objective     Last Recorded Vitals  Blood pressure (!) 155/108, pulse (!) 49, temperature 36.6 °C (97.9 °F), resp. rate 12, weight 69.6 kg (153 lb 7 oz), SpO2 100%.    Physical Exam  Base Eye Exam       Visual Acuity (Snellen - Linear)         Right Left    Near sc 20/20- 20/20-              Tonometry (Tonopen, 2:14 PM)         Right Left    Pressure 15 15              Pupils         Dark Light Shape React APD    Right 5 3 Round Brisk None    Left 5 3 Round Brisk None              Visual Fields         Left Right     Full Full              Extraocular Movement         Right Left     Full Full                  Additional Tests       Color         Right Left    Ishihara 11/11 11/11                    Relevant Results                                     Assessment/Plan   Assessment & Plan  Acute idiopathic CVST ([facility] [place])    This 34 year old woman with history of lacunar stroke (7/2018), HTN, HLD, migraine, HELLP syndrome, NAFLD, prediabetes, and gastric bypass surgery is admitted for CVST. Initial exam revealed papilledema OD and patient refused exam OS. Currently undergoing treatment with heparin and hypercoagulability workup. Also receiving acetazolamide as temporizing treatment to protect against optic neuropathy during period of elevated ICP.    Update 2/13/25: Stable, reassuring entrance exam today. Recommend continued CVST treatment, acetazolamide and completion of course of thiamine.    Plan:  - Recommend continuing acetazolamide 500 BID  - Recommend completing course of thiamine and following up thiamine level  - Ophthalmology will continue to follow      Bimal Osorio M.D.  Ophthalmology, PGY3    Ophthalmology Adult Pager - [phone]  Ophthalmology Pediatrics Pager - [phone]     For adult  follow-up appointments, call: [phone]  For pediatric follow-up appointments, call: [phone]     NOTE: This note is not finalized until attending reviews and signs.               Bimal Osorio MD

## 2025-02-13 NOTE — PROGRESS NOTES
Vancomycin Dosing by Pharmacy- FOLLOW UP    Shreya Garces is a 34 y.o. year old female who Pharmacy has been consulted for vancomycin dosing for CNS/meningoencephalitis. Based on the patient's indication and renal status this patient is being dosed based on a goal AUC of 500-600.     Renal function is currently stable.    Current vancomycin dose: 1750 mg given every 12 hours    Estimated vancomycin AUC on current dose: 515 mg/L.hr     Visit Vitals  BP (!) 155/108   Pulse (!) 49   Temp 36.6 °C (97.9 °F)   Resp 12        Lab Results   Component Value Date    CREATININE 0.38 (L) 2025    CREATININE 0.43 (L) 2025    CREATININE 0.47 (L) 2025    CREATININE 0.43 (L) 2025        Patient weight is as follows:   Vitals:    25 0000   Weight: 69.6 kg (153 lb 7 oz)       Cultures:  No results found for the encounter in last 14 days.       I/O last 3 completed shifts:  In: 1401.7 (20.1 mL/kg) [I.V.:816.7 (11.7 mL/kg); IV Piggyback:585]  Out: 1300 (18.7 mL/kg) [Urine:1300 (0.5 mL/kg/hr)]  Weight: 69.6 kg   I/O during current shift:  I/O this shift:  In: 1047 [P.O.:120; I.V.:595; IV Piggyback:332]  Out: 2350 [Urine:2350]    Temp (24hrs), Av.8 °C (98.2 °F), Min:36.4 °C (97.5 °F), Max:37.2 °C (99 °F)      Assessment/Plan    Within goal AUC range. Continue current vancomycin regimen.    This dosing regimen is predicted by [employer] to result in the following pharmacokinetic parameters:    Regimen: 1750 mg IV every 12 hours.  Start time: 15:30 on 2025  Exposure target: AUC24 (range)500-600 mg/L.hr   [id number]: 512 mg/L.hr  AUC24,ss: 515 mg/L.hr  Probability of AUC24 > 400: 97 %  Ctrough,ss: 12.7 mg/L  Probability of Ctrough,ss > 20: 1 %      The next level will be obtained on  at am lab draw. May be obtained sooner if clinically indicated.   Will continue to monitor renal function daily while on vancomycin and order serum creatinine at least every 48 hours if not already ordered.  Follow for  continued vancomycin needs, clinical response, and signs/symptoms of toxicity.       Colin Browning, PharmD

## 2025-02-13 NOTE — SIGNIFICANT EVENT
Called to patient's bed ~15:00 due to patient screaming in unbearable pain.  Patient's aunt was at bedside, patient was screaming that the medication for her headache was not working.  At this time nurse was in the process of administering migraine cocktail.  Patient stated it was not working and she was not getting the medication that worked for her pain.    Patient screamed that she wanted to leave the hospital, she was going to remove her IVs and go home to deal with the pain there as we weren't treating it.  Patient was reassured at bedside that the medication would take time to work and we would alter the timing of her regimen to provide better pain control.  Patient continued to scream that she was going to go home.    Attempted to explain to patient the risks of leaving the hospital without treatment, she was not receptive to explanation and could not communicate back to provider and understanding of the risks of leaving AMA (in this case severe injury and death).  As patient was not able to express an understanding of her current condition and the risks associated with refusing medical care, she does not have capacity at this time to leave the hospital against medical advice.    If the patient attempts to leave AMA a Code Violet should be called and provider should be notified immediately.

## 2025-02-13 NOTE — H&P
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    Per chart review:  Patient presented to ED with 3 weeks of flulike symptoms and 1.5 weeks of right eye blurry vision with constant frontal head pressure. 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 On 2/12, there were concerns that the patient had continued headache, worsened lethargy with agitation and worsened vision loss c/f Cushing's reflex. Decision was made to transfer to NSU for further monitoring. ophthalmology evaluation, and possible neurosurgical intervention for CVST removal.    At [facility]:  Vitals range: /44-1 83/112, HR 37-46, RR 20 -> 8, afebrile, on room air  CBC: 14.9 > 7.1 < 538  INR 1.1  Protein C/S WNL, anticardiolipin WNL, FVIII 226 (H)  Factor V Leiden, JAK2 mutation, Prothrombin gene pending  RFP: 140/3.6/104/26/8/0.47  LFT: AST 72, ALT 29, Alk Phos 110, bili 0.6  EKG: sinus rhythm, 2nd degree AV block, HR 49, , QTc 473  CTH: unremarkable  CTV thrombus in posterior superior sagittal sinus, straight sinus, R transverse and R sigmoid sinus  MRI brain w/wo: no DWI to suggest infarct, no GRE or contrast enhancement; possible flattening of optic nerve head L>R   MRV intracranial: thrombus in posterior half of superior sagittal sinus, straight sinus, vein of liliya, right sigmoid and transverse sinus into right internal jugular vein and proximal left transverse sinus  TTE: report pending    Interventions:  - Telestroke/neurology consulted, started on heparin drip, MRI/MRV recommend and hypercoagulable labs pending; neurologist noted possible R optic disc edema and enlarged  blind spot.   - ID consulted for leukocytosis, no clear source but empirically treated for meningitis with Van/CTX; there was plan for LP (which patient refused)  - NSGY consulted for possible neurosurgical intervention for CVST but they deferred to medical management  - Cardiology consulted for bradycardia, no clear recommendations    Past Medical History  Past Medical History:   Diagnosis Date    Other conditions influencing health status     History of back problems    Personal history of other diseases of the circulatory system     History of essential hypertension    Personal history of other diseases of the nervous system and sense organs     History of migraine     Surgical History  Past Surgical History:   Procedure Laterality Date     SECTION, CLASSIC  2017     Section    MR HEAD ANGIO WO IV CONTRAST  2019    MR HEAD ANGIO WO IV CONTRAST 2019 [facility]     Social History  Social History     Tobacco Use    Smoking status: Never    Smokeless tobacco: Never   Vaping Use    Vaping status: Every Day    Substances: Nicotine    Devices: Disposable   Substance Use Topics    Alcohol use: Yes     Comment: Occasionally     Allergies  Atorvastatin and Penicillins  Home Medications  No medications prior to admission.     Neurological Exam  Heart Rate:  [38-80]   Temp:  [35.9 °C (96.6 °F)-37.2 °C (99 °F)]   Resp:  [9-26]   BP: (139-184)/()   SpO2:  [95 %-99 %]   GENERAL APPEARANCE: Eyes closed reported headache 7/10 some light sensitivity. No distress, alert, interactive and cooperative. Neck is supple.    CARDIOVASCULAR: Regular, rate and rhythm, no murmurs, normal S1 and S2. Carotid pulses intact without any bruits. Pulses +2 and equal in all extremities. No swelling, varicosities, edema, or tenderness to palpation.      MENTAL STATE:   Orientation was normal to time, place and person. Recent and remote memory was intact.  Attention span and concentration were normal.  Language testing was normal for comprehension, repetition, expression, and naming. The patient could correctly interpret a picture. General fund of knowledge was intact.     CRANIAL NERVES:   CN 2   Visual fields full to confrontation.   CN 3, 4, 6   Pupils round, 4 mm in diameter, equally reactive to light. Lids symmetric; no ptosis. EOMs normal alignment, full range with normal saccades, pursuit and convergence.   No nystagmus.   CN 5   Facial sensation intact bilaterally.   CN 7   Normal and symmetric facial strength. Nasolabial folds symmetric.   CN 8   Hearing intact to finger rub.   CN 9   Palate elevates symmetrically.   CN 11   Normal strength of shoulder shrug and neck turning.   CN 12   Tongue midline, with normal bulk and strength; no fasciculations.     MOTOR:   Muscle bulk and tone were normal in both upper and lower extremities.   No fasciculations, tremor or other abnormal movements were present.     R          L  Deltoids 5 5  Biceps 5 5  Triceps 5 5  Wrist Flex 5 5  Wrist Ext 5 5    Hip Flex 5 5  Knee Flex 5 5  Knee Ext 5 5  Dorsiflex 5 5  Plantarflex 5 5    REFLEXES:   R         L  BR:  3 3  Biceps: 3 3  Triceps: 2 2  Knee:  3 3  Ankle:  2 2    Babinski: toes downgoing to plantar stimulation. No clonus or other pathologic reflexes present.   Ware positive right upper limb  Adductor positive right lower limb    SENSORY:   In both upper and lower extremities, sensation was intact to light touch    COORDINATION:    In both upper extremities, finger-nose-finger was intact without dysmetria or overshoot.   In both lower extremities, heel-to-shin was intact. DOUGLAS were intact in both upper and lower extremities.      GAIT:   Deferred      MR brain w and wo IV contrast    Result Date: 2/11/2025  Interpreted By:  Kodak Castrejon, STUDY: MR BRAIN W AND WO IV CONTRAST;  2/11/2025 7:11 pm   INDICATION: Signs/Symptoms:Cerebral venous thrombosis Please assess for cavernou sinus thrombosis or strokes.      COMPARISON: CTV earlier today.   ACCESSION NUMBER(S): [id number]   ORDERING CLINICIAN: APOLINAR ALEX   TECHNIQUE: T2, FLAIR, DWI, ADC, gradient echo T2, and T1 weighted images of brain were acquired without intravenous contrast administration. Multi planar T1 weighted imaging was acquired after administration of 13 ML Dotarem gadolinium based intravenous contrast.   FINDINGS: No abnormal brain parenchymal or meningeal enhancement. No intracranial mass lesions.  No destructive osseous lesions.   No acute intracranial hemorrhage. No extra-axial fluid collection. No abnormally restricted diffusion to suggest recent infarction.   Nonspecific white matter FLAIR signal increase most likely related to mild chronic small vessel ischemic change.   Moderate paranasal sinus/ethmoid mucosal inflammatory changes. Right-sided nonspecific mastoid fluid. Orbital contents unremarkable.       1.  No acute intracranial abnormality. Unremarkable brain.     MACRO: None   Signed by: Kodak Castrejon 2/11/2025 7:24 PM Dictation workstation:   [id number]   CT head wo IV contrast    Result Date: 2/11/2025  Interpreted By:  Savannah Tipton, STUDY: CT HEAD WO IV CONTRAST; CT VENOGRAM HEAD W AND WO IV CONTRAST AND POST PROCEDURE;  2/10/2025 11:28 pm; 2/11/2025 12:22 am   INDICATION: Signs/Symptoms:headache, subjective visual change; Signs/Symptoms:Concern for right transverse.   COMPARISON: None.   ACCESSION NUMBER(S): [id number]; [id number]   ORDERING CLINICIAN: BETTE JARA   TECHNIQUE: Axial noncontrast CT images of the head. Contrast-enhanced CT venogram.   FINDINGS: BRAIN PARENCHYMA: On the unenhanced imaging there is hyperdense attenuation within the posterosuperior sagittal sinus as well as the straight sinus, confluence and predominantly the right transverse sinus extending into the right sigmoid sinus. Findings are consistent with acute dural sinus thrombosis.   Gray-white matter interfaces are preserved. No mass effect or midline  shift.   HEMORRHAGE: No acute intracranial hemorrhage. VENTRICLES and EXTRA-AXIAL SPACES: Normal size. EXTRACRANIAL SOFT TISSUES: Within normal limits. PARANASAL SINUSES/MASTOIDS: Mucosal thickening of the right maxillary sinus. Fluid throughout the left maxillary sinus, left ethmoid air cells on the left frontal sinus. CALVARIUM: No depressed skull fracture. No destructive osseous lesion.   Filling defect following contrast administration noted in the posterior superior sagittal sinus extending into the confluence. There is no significant contrast opacification within the straight sinus, the right transverse sinus or the right sigmoid sinus. The left transverse sinus is slightly diminutive which could be developmental versus related to partial filling defects. The internal cerebral veins and vein of Conor appears patent.   OTHER FINDINGS: None.       Hyperdense thrombus on unenhanced imaging with corresponding filling defects on CT venogram noted within the posterior superior sagittal sinus, the straight sinus, the right transverse sinus and the right sigmoid sinus consistent with extensive posterior dural venous thrombosis. Diminutive appearance of the left transverse sinus which may be developmental.   No evidence of hemorrhagic infarct.   MACRO: Savannah Tipton discussed the significance and urgency of this critical finding by telephone with Babak Elliott on 2/11/2025 at 1:31 am.  (**-RCF-**) Findings:  See findings.   Signed by: Savannah Tipton 2/11/2025 1:37 AM Dictation workstation:   [id number]   Transthoracic Echo (TTE) Complete    Result Date: 2/12/2025            [facility] [place] [address] [facility] [place] [address]    Tel [phone] Fax [phone] TRANSTHORACIC ECHOCARDIOGRAM REPORT Patient Name:       VERONICA CASTRO     Reading Physician:    [id number] Andreas Ortega MD Study Date:         2/11/2025            Ordering Provider:     [id number] ILEANA JOHNSON MRN/PID:            [id number]             Fellow: Accession#:         [id number]         Nurse: Date of Birth/Age:  1990 / 34 years Sonographer:          Elaine Baum Gender Assigned at  F                    Additional Staff: Birth: Height:             162.56 cm            Admit Date: Weight:             63.96 kg             Admission Status:     Inpatient -                                                                Routine BSA / BMI:          1.69 m2 / 24.20      Department Location:  [facility]                     kg/m2 Blood Pressure: 156 /104 mmHg Study Type:    TRANSTHORACIC ECHO (TTE) COMPLETE Diagnosis/ICD: Bradycardia, unspecified-R00.1; Other cerebral infarction due to                occlusion or stenosis of small artery-I63.81 Indication:    Bradycardia, Lacunar stroke CPT Codes:     Echo Complete w Full [id number] Patient History: Smoker:            Current. Pertinent History: HTN, Hyperlipidemia, TIA and CVA. Study Detail: The following Echo studies were performed: 2D, M-Mode, Doppler and               color flow. Agitated saline used as a contrast agent for               intraseptal flow evaluation.  PHYSICIAN INTERPRETATION: Left Ventricle: Left ventricular ejection fraction is normal, calculated by Willams's biplane at 64%. There are no regional wall motion abnormalities. The left ventricular cavity size is upper limits of normal. There is normal septal and normal posterior left ventricular wall thickness. Spectral Doppler shows a normal pattern of left ventricular diastolic filling with normal left atrial filling pressure. LV Wall Scoring: All segments are normal. Left Atrium: The left atrial size is normal. Right Ventricle: The right ventricle is normal in size. There is normal right ventricular global systolic function. Right Atrium: The right atrial size is normal. Aortic Valve: The aortic valve is  trileaflet. There is no aortic valve thickening. The aortic valve dimensionless index is 0.68. There is no evidence of aortic valve regurgitation. The peak instantaneous gradient of the aortic valve is 8 mmHg. The mean gradient of the aortic valve is 5 mmHg. Mitral Valve: The mitral valve is normal in structure. There is trace mitral valve regurgitation. Tricuspid Valve: The tricuspid valve is structurally normal. There is trace tricuspid regurgitation. The Doppler estimated RVSP is within normal limits at 24.0 mmHg. Pulmonic Valve: The pulmonic valve is structurally normal. There is no indication of pulmonic valve regurgitation. Pericardium: No pericardial effusion noted. Aorta: The aortic root is normal. There is no dilatation of the aortic arch. There is no dilatation of the ascending aorta. There is no dilatation of the aortic root. Pulmonary Artery: The main pulmonary artery is normal in size, and position, with normal bifurcation into the left and right pulmonary arteries.  CONCLUSIONS:  1. Left ventricular ejection fraction is normal, calculated by Willams's biplane at 64%.  2. There is normal right ventricular global systolic function.  3. Right ventricular systolic pressure is within normal limits. QUANTITATIVE DATA SUMMARY:  2D MEASUREMENTS:             Normal Ranges: LAs:             3.50 cm     (2.7-4.0cm) IVSd:            0.90 cm     (0.6-1.1cm) LVPWd:           1.10 cm     (0.6-1.1cm) LVIDd:           4.70 cm     (3.9-5.9cm) LVIDs:           3.20 cm LV Mass Index:   97.5 g/m2 LVEDV Index:     56.63 ml/m2 LV % FS          31.9 %  LEFT ATRIUM:                  Normal Ranges: LA Vol A4C:        56.6 ml    (22+/-6mL/m2) LA Vol A2C:        49.8 ml LA Vol BP:         55.3 ml LA Vol Index A4C:  33.6ml/m2 LA Vol Index A2C:  29.5 ml/m2 LA Vol Index BP:   32.8 ml/m2 LA Area A4C:       19.1 cm2 LA Area A2C:       17.2 cm2 LA Major Axis A4C: 5.5 cm LA Major Axis A2C: 5.0 cm LA Volume Index:   29.5 ml/m2 LA Vol  A4C:        48.8 ml LA Vol A2C:        47.5 ml LA Vol Index BSA:  28.6 ml/m2  RIGHT ATRIUM:                 Normal Ranges: RA Vol A4C:        24.3 ml    (8.3-19.5ml) RA Vol Index A4C:  14.4 ml/m2 RA Area A4C:       11.8 cm2 RA Major Axis A4C: 4.9 cm  M-MODE MEASUREMENTS:         Normal Ranges: Ao Root:             3.60 cm (2.0-3.7cm) AoV Exc:             1.90 cm (1.5-2.5cm)  AORTA MEASUREMENTS:         Normal Ranges: AoV Exc:            1.90 cm (1.5-2.5cm) Ao Sinus, d:        3.20 cm (2.1-3.5cm) Ao STJ, d:          2.60 cm (1.7-3.4cm) Asc Ao, d:          3.40 cm (2.1-3.4cm)  LV SYSTOLIC FUNCTION:                      Normal Ranges: EF-A4C View:    63 % (>=55%) EF-A2C View:    66 % EF-Biplane:     64 % LV EF Reported: 64 %  LV DIASTOLIC FUNCTION:            Normal Ranges: MV Peak E:             0.94 m/s   (0.7-1.2 m/s) MV Peak A:             0.37 m/s   (0.42-0.7 m/s) E/A Ratio:             2.53       (1.0-2.2) MV e'                  0.104 m/s  (>8.0) MV lateral e'          0.10 m/s MV medial e'           0.11 m/s E/e' Ratio:            9.02       (<8.0) PulmV Sys Raf:         65.10 cm/s PulmV Vera Raf:        36.60 cm/s PulmV S/D Raf:         1.80 PulmV A Revs Raf:      31.20 cm/s PulmV A Revs Dur:      82.00 msec  MITRAL VALVE:          Normal Ranges: MV DT:        207 msec (150-240msec)  MITRAL INSUFFICIENCY:             Normal Ranges: MR Vmax:              363.00 cm/s  AORTIC VALVE:                     Normal Ranges: AoV Vmax:                1.45 m/s (<=1.7m/s) AoV Peak P.4 mmHg (<20mmHg) AoV Mean P.0 mmHg (1.7-11.5mmHg) LVOT Max Raf:            1.05 m/s (<=1.1m/s) AoV VTI:                 34.50 cm (18-25cm) LVOT VTI:                23.50 cm LVOT Diameter:           2.20 cm  (1.8-2.4cm) AoV Area, VTI:           2.59 cm2 (2.5-5.5cm2) AoV Area,Vmax:           2.75 cm2 (2.5-4.5cm2) AoV Dimensionless Index: 0.68  RIGHT VENTRICLE: RV Basal 3.30 cm RV Mid   2.90 cm RV Major 6.8 cm TAPSE:    29.2 mm RV s'    0.12 m/s  TRICUSPID VALVE/RVSP:          Normal Ranges: Peak TR Velocity:     2.29 m/s Est. RA Pressure:     3 mmHg RV Syst Pressure:     24 mmHg  (< 30mmHg) IVC Diam:             2.40 cm  PULMONIC VALVE:          Normal Ranges: PV Accel Time:  148 msec (>120ms) PV Max Raf:     0.9 m/s  (0.6-0.9m/s) PV Max PG:      3.3 mmHg  PULMONARY VEINS: PulmV A Revs Dur: 82.00 msec PulmV A Revs Raf: 31.20 cm/s PulmV Vera Raf:   36.60 cm/s PulmV S/D Raf:    1.80 PulmV Sys Raf:    65.10 cm/s  [id number] Andreas Ortega MD Electronically signed on 2/12/2025 at 5:08:37 PM  Wall Scoring  ** Final **          BNP   Date/Time Value Ref Range Status   02/11/2025 11:02  (H) 0 - 99 pg/mL Final     I have personally reviewed the following imaging results MR venography intracranial w and wo IV contrast    Result Date: 2/11/2025  Interpreted By:  Kodak Castrejon, STUDY: MR VENOGRAPHY INTRACRANIAL W AND WO IV CONTRAST;  2/11/2025 7:11 pm   INDICATION: Signs/Symptoms:Central venous thrombosis.     COMPARISON: MRI brain today and CTV today.   ACCESSION NUMBER(S): [id number]   ORDERING CLINICIAN: KEHINDE RAY   TECHNIQUE: 2-D time-of-flight intracranial MRV and subsequent 2-D and 3-D reconstructions by the technologist. Additional axial volumetric T1 postcontrast acquisition.   FINDINGS: Again demonstrated is extensive thrombosis posterior half of the superior sagittal sinus, straight sinus, vein of Conor, right sigmoid and transverse sinus into the right internal jugular vein, and proximal left transverse sinus.       Again demonstrated is extensive thrombosis posterior half of the superior sagittal sinus, straight sinus, vein of Conor, right sigmoid and transverse sinus into the right internal jugular vein, and proximal left transverse sinus.   MACRO: None   Signed by: Kodak Castrejon 2/11/2025 7:26 PM Dictation workstation:   [id number]    MR brain w and wo IV contrast    Result Date: 2/11/2025  Interpreted By:   Kodak Castrejon, STUDY: MR BRAIN W AND WO IV CONTRAST;  2/11/2025 7:11 pm   INDICATION: Signs/Symptoms:Cerebral venous thrombosis Please assess for cavernou sinus thrombosis or strokes.     COMPARISON: CTV earlier today.   ACCESSION NUMBER(S): [id number]   ORDERING CLINICIAN: APOLINAR ALEX   TECHNIQUE: T2, FLAIR, DWI, ADC, gradient echo T2, and T1 weighted images of brain were acquired without intravenous contrast administration. Multi planar T1 weighted imaging was acquired after administration of 13 ML Dotarem gadolinium based intravenous contrast.   FINDINGS: No abnormal brain parenchymal or meningeal enhancement. No intracranial mass lesions.  No destructive osseous lesions.   No acute intracranial hemorrhage. No extra-axial fluid collection. No abnormally restricted diffusion to suggest recent infarction.   Nonspecific white matter FLAIR signal increase most likely related to mild chronic small vessel ischemic change.   Moderate paranasal sinus/ethmoid mucosal inflammatory changes. Right-sided nonspecific mastoid fluid. Orbital contents unremarkable.       1.  No acute intracranial abnormality. Unremarkable brain.     MACRO: None   Signed by: Kodak Castrejon 2/11/2025 7:24 PM Dictation workstation:   [id number]    XR chest 2 views    Result Date: 2/11/2025  Interpreted By:  Roxanna Diego, STUDY: XR CHEST 2 VIEWS;  2/11/2025 10:16 am   INDICATION: Signs/Symptoms:prior left retrocardiac opacity.   COMPARISON: 09/11/2011   ACCESSION NUMBER(S): [id number]   ORDERING CLINICIAN: LEONIDAS MARTINO   FINDINGS: Heart is normal in size.   There is a suggestion of some patchiness at the right lung base. There is no obvious pleural fluid.   The mediastinum and bones are unremarkable.   COMPARISON OF FINDING: The increased markings at the left base were not obvious previously.       Question of left basilar infiltrate or atelectasis.   MACRO: none   Signed by: Roxanna Diego 2/11/2025 10:22 AM Dictation workstation:    [id number]    CT head wo IV contrast    Result Date: 2/11/2025  Interpreted By:  Savannah Tipton, STUDY: CT HEAD WO IV CONTRAST; CT VENOGRAM HEAD W AND WO IV CONTRAST AND POST PROCEDURE;  2/10/2025 11:28 pm; 2/11/2025 12:22 am   INDICATION: Signs/Symptoms:headache, subjective visual change; Signs/Symptoms:Concern for right transverse.   COMPARISON: None.   ACCESSION NUMBER(S): [id number]; [id number]   ORDERING CLINICIAN: BETTE JARA   TECHNIQUE: Axial noncontrast CT images of the head. Contrast-enhanced CT venogram.   FINDINGS: BRAIN PARENCHYMA: On the unenhanced imaging there is hyperdense attenuation within the posterosuperior sagittal sinus as well as the straight sinus, confluence and predominantly the right transverse sinus extending into the right sigmoid sinus. Findings are consistent with acute dural sinus thrombosis.   Gray-white matter interfaces are preserved. No mass effect or midline shift.   HEMORRHAGE: No acute intracranial hemorrhage. VENTRICLES and EXTRA-AXIAL SPACES: Normal size. EXTRACRANIAL SOFT TISSUES: Within normal limits. PARANASAL SINUSES/MASTOIDS: Mucosal thickening of the right maxillary sinus. Fluid throughout the left maxillary sinus, left ethmoid air cells on the left frontal sinus. CALVARIUM: No depressed skull fracture. No destructive osseous lesion.   Filling defect following contrast administration noted in the posterior superior sagittal sinus extending into the confluence. There is no significant contrast opacification within the straight sinus, the right transverse sinus or the right sigmoid sinus. The left transverse sinus is slightly diminutive which could be developmental versus related to partial filling defects. The internal cerebral veins and vein of Conor appears patent.   OTHER FINDINGS: None.       Hyperdense thrombus on unenhanced imaging with corresponding filling defects on CT venogram noted within the posterior superior sagittal sinus, the straight sinus, the  right transverse sinus and the right sigmoid sinus consistent with extensive posterior dural venous thrombosis. Diminutive appearance of the left transverse sinus which may be developmental.   No evidence of hemorrhagic infarct.   MACRO: Savannah Tipton discussed the significance and urgency of this critical finding by telephone with Babak Elliott on 2/11/2025 at 1:31 am.  (**-RCF-**) Findings:  See findings.   Signed by: Savannah Tipton 2/11/2025 1:37 AM Dictation workstation:   [id number]    CT venogram head w and wo iv contrast    Result Date: 2/11/2025  Interpreted By:  Savannah Tipton, STUDY: CT HEAD WO IV CONTRAST; CT VENOGRAM HEAD W AND WO IV CONTRAST AND POST PROCEDURE;  2/10/2025 11:28 pm; 2/11/2025 12:22 am   INDICATION: Signs/Symptoms:headache, subjective visual change; Signs/Symptoms:Concern for right transverse.   COMPARISON: None.   ACCESSION NUMBER(S): [id number]; [id number]   ORDERING CLINICIAN: BETTE JARA   TECHNIQUE: Axial noncontrast CT images of the head. Contrast-enhanced CT venogram.   FINDINGS: BRAIN PARENCHYMA: On the unenhanced imaging there is hyperdense attenuation within the posterosuperior sagittal sinus as well as the straight sinus, confluence and predominantly the right transverse sinus extending into the right sigmoid sinus. Findings are consistent with acute dural sinus thrombosis.   Gray-white matter interfaces are preserved. No mass effect or midline shift.   HEMORRHAGE: No acute intracranial hemorrhage. VENTRICLES and EXTRA-AXIAL SPACES: Normal size. EXTRACRANIAL SOFT TISSUES: Within normal limits. PARANASAL SINUSES/MASTOIDS: Mucosal thickening of the right maxillary sinus. Fluid throughout the left maxillary sinus, left ethmoid air cells on the left frontal sinus. CALVARIUM: No depressed skull fracture. No destructive osseous lesion.   Filling defect following contrast administration noted in the posterior superior sagittal sinus extending into the confluence. There is no  significant contrast opacification within the straight sinus, the right transverse sinus or the right sigmoid sinus. The left transverse sinus is slightly diminutive which could be developmental versus related to partial filling defects. The internal cerebral veins and vein of Conor appears patent.   OTHER FINDINGS: None.       Hyperdense thrombus on unenhanced imaging with corresponding filling defects on CT venogram noted within the posterior superior sagittal sinus, the straight sinus, the right transverse sinus and the right sigmoid sinus consistent with extensive posterior dural venous thrombosis. Diminutive appearance of the left transverse sinus which may be developmental.   No evidence of hemorrhagic infarct.   MACRO: Savannah Tipton discussed the significance and urgency of this critical finding by telephone with Babak Elliott on 2/11/2025 at 1:31 am.  (**-RCF-**) Findings:  See findings.   Signed by: Savannah Tipton 2/11/2025 1:37 AM Dictation workstation:   [id number]    XR chest 2 views    Result Date: 1/31/2025  * * *Final Report* * * DATE OF EXAM: Jan 31 2025  6:55PM   VHX   [id number]  -  XR CHEST 2V FRONTAL/LAT  / ACCESSION #  [id number] PROCEDURE REASON: Cough      * * * * Physician Interpretation * * * *  EXAMINATION:  CHEST RADIOGRAPH (2 VIEW FRONTAL & LATERAL) CLINICAL HISTORY: Cough MQ:  [id number] EXAM DATE/TIME:  1/31/2025 6:55 PM COMPARISON:  No relevant prior studies available. RESULT: Lines, tubes, and devices:  None. Lungs and pleura:  Concern for left retrocardiac airspace opacity Cardiomediastinal silhouette:  Normal cardiomediastinal silhouette. Bones and soft tissues:  Unremarkable.    IMPRESSION: Concern for left retrocardiac infiltrate. Mild distention with air of large bowel : SYLVIA   Transcribe Date/Time: Jan 31 2025  7:12P Dictated by : JANIS DE LA ROSA MD This examination was interpreted and the report reviewed and electronically signed by: JANIS DE LA ROSA MD on Jan 31 2025  7:15PM   EST      Assessment/Plan   34-year-old woman with history of prior lacunar infarct (7/2018), HTN, HLD, migraines, DUSTIN and depression who presented to [facility] on 2/11 with 10-days of headache, found to have superior sagittal and right sigmoid venous sinus thrombosis, transferred to NSU for further management after developing worsening headache and vision loss after starting heparin drip. On exam patient is bradycardiac 30-40s Blood pressure 136/95, resp. rate 13, SpO2 96%. A&Ox4 EOM intact pupils equal reactive bilaterally no weakness reflexes exaggerated on the right with positive moreno. Repeat CT stable.    Assessment & Plan  Acute idiopathic CVST ([facility])      PLAN:  NEURO:  # CVST  # c/f elevated ICP and R optic disc edema  # sinusitis, ?c/f meningitis at OSH  :: CTH unremarkable  :: CTV thrombus in posterior superior sagittal sinus, straight sinus, R transverse and R sigmoid sinus  :: MRI brain w/wo showed no DWI to suggest infarct, no GRE or contrast enhancement; possible flattening of optic nerve head L>R   :: MRV intracranial showed thrombus in posterior half of superior sagittal sinus, straight sinus, vein of liliya, right sigmoid and transverse sinus into right internal jugular vein and proximal left transverse sinus  :: Hypercoagulable labs: Protein C/S WNL, anticardiolipin WNL, FVIII 226 (H)  [ ] Pending labs: Factor V Leiden, JAK2 mutation, Prothrombin  - NSU status, Q1 hour neuro checks and vitals  - started on heparin drip  - plan for repeat CT head   - ophthalmology consult for dilated funduscopic exam  - will consider NSGY vs. Neuro IR consult for CVST removal    CARDIAC:  #Bradycardia c/f Cushing's Reflex  :: EKG: sinus rhythm, 2nd degree AV block, HR 49, , QTc 473  :: TTE: performed 2/11 report pending  - repeat EKG  - continue to monitor on telemetry    PULM:  #Bradypnea, intermittent  :: RR decreased to 8-9 at OSH, SpO2 >95% on RA  :: CXR 2/11 showed left basilar infiltrate vs.  Atelectasis  - will continue to monitor respiratory status    RENAL:  No active issues  - Daily RFP  - Continue to monitor I&Os    GI:  No active issues  - Bowel regimen: miralax, doc-senna  - Diet: NPO pending bedside eval    ENDO:  No active issues  ::HbA1c 5.4% in 2023    HEME/ONC:  #Anemia  :: Hgb 7.1, Plt 538  - Daily CBC  - [ ] consent for blood transfusion    INFECTIOUS DISEASE:  #Leukocytosis   :: WBC 14.9  - DDx: PNA vs. Sinusitis vs. CNS infection  - CXR possible left basilar infiltrate vs. Atelectasis  - UA negative    Misc  - Keep NPO, pending bedside swallow eval  - Monitor RFP, replete electrolytes as needed  - ISS and hypoglycemia protocol  - SCDs + Heparin drip  - PT/OT/SLP pending    Pain medications: PRN Tylenol  Fluids: Replete PRN  Electrolytes: Keep mg >2, phos >3  and K >4  Nutrition:  NPO Diet; Effective now   Antimicrobials: Ceftriaxone  Anticoagulation: Low dose heparin  DVT PPX: Heparin Drip  GI ppx: none needed  Bowel care: Miralax  Catheter: None  Lines: PIV  Oxygen: Room Air  Drips:  NS 75ml/hr    Disposition:   Pending    Code Status: Full Code (confirmed on admission)   NOK:  Primary Emergency Contact: Melisa Garces, Home Phone: [phone]       Matthew Tran MD  PGY-2 Neurology  Gen Neuro Pager: [phone]  Stroke Pager: [phone]  Epilepsy Pager: [phone]

## 2025-02-13 NOTE — PROGRESS NOTES
Occupational Therapy                 Therapy Communication Note    Patient Name: Shreya Garces  MRN: [id number]  Department: [facility]  Room: 07/07-A  Today's Date: 2/13/2025     Discipline: Occupational Therapy          Missed Visit Reason: Missed Visit Reason: Patient placed on medical hold (pt agitated and unable to be redirected, will hold OT until appropriate)    Missed Time: Attempt    Comment:

## 2025-02-13 NOTE — PROGRESS NOTES
Communication Note    Patient Name: Shreya Garces  MRN: [id number]  Today's Date: 2/13/2025   Room: 07/07A    Discipline: Physical Therapy      PT Missed Visit: Yes  Missed Visit Reason: Patient refused (Pt requests therapist check back in closer to 1300 when she is next due for pain medications. PT to re-attempt.)      02/13/25 at 12:00 PM   Alise Rushing PT   Rehab Office: [phone]

## 2025-02-13 NOTE — CONSULTS
History of Present Illness:  This is a 34 year old female with a PMHx of lacunar stroke (7/2018), HTN, HLD, migraine, HELLP syndrome, NAFLD, prediabetes, and gastric bypass surgery who presents as a transfer from [facility].     Patient initially presented to [facility] with a ten day history of headache. While in the ED, patient was noted to be bradycardic to the 40s but otherwise vitally stable. CBC revealed leukocytosis & anemia with Hgb 7.1. Imaging with CTH and CT Venogram revealed a hyperdense thrombus with corresponding filling defects within the posterior superior sagittal sinus, the straight sinus, the right transverse sinus and the right sigmoid sinus consistent with extensive posterior dural venous thrombosis. Patient was managed ith abx (CTX/Vanc/Flagyl) & heparin. Patient declined LP during the admission. Given extensive dural venous thrombosis patient has now been transferred to [facility] under neuro critical care. Ophthalmology has been consulted to evaluate for optic disc edema.    On bedside evaluation, patient is able to state her symptoms and states she has only really noticed a headache. She also noticed brief episodes (~20 minutes) where she felt that part of her vision in both eyes were blurry. These episodes happened most recently a couple days ago.    At this time, patient currently being managed with a heparin drip, ceftriaxone, & vancomycin.    The patient reports the following regarding associated symptoms: headaches: endorses, pulsatile tinnitus: denies, transient visual obscurations: denies, & diplopia: denies.    ROS: Patient denies pain, redness, discharge, decreased vision, double vision, blind spots, flashes, or new floaters. All other systems have been reviewed and are negative.    PMHx: please refer to admission HPI  Medications: please refer to medication reconciliation  Allergies: please refer to patient allergy list  Past Ocular History: as per above HPI  Family History:  reviewed and noncontributory to chief ophthalmic complaint  Orientation: Alert and oriented x3, appropriate mood and behavior    Examination:     Base Eye Exam       Visual Acuity (Snellen - Linear)         Right Left    Near sc 20/20 20/20-1              Tonometry (Tonopen, 10:28 PM)         Right Left    Pressure 16 14              Pupils         Dark Light Shape React APD    Right 2 1 Round Brisk None    Left 2 1 Round Brisk None              Visual Fields         Left Right     Full Full              Extraocular Movement         Right Left     Full Full              Dilation       Both eyes: 1% Mydriacyl & 2.5% Andrey  @ 10:28 PM                  Additional Tests       Color         Right Left    Ishihara 11/11 11/11                  Slit Lamp and Fundus Exam       External Exam         Right Left    External Normal Normal              Slit Lamp Exam         Right Left    Lids/Lashes Normal Normal    Conjunctiva/Sclera White and quiet White and quiet    Cornea Clear Clear    Anterior Chamber Deep and quiet Deep and quiet    Iris Round and reactive Round and reactive    Lens Clear Clear    Anterior Vitreous Normal Normal              Fundus Exam         Right Left    Disc Frisen gr2 optic disc edema     Macula Normal     Vessels Normal     After evaluation of right eye, patient became very agitated and refused examination in the left eye. Explained to patient that sight and life threatening pathology are of concern and she understands and states that I am not allowed to examine her further                    At [facility]:  Vitals range: /44-1 83/112, HR 37-46, RR 20 -> 8, afebrile, on room air  CBC: 14.9 > 7.1 < 538  INR 1.1  Protein C/S WNL, anticardiolipin WNL, FVIII 226 (H)  Factor V Leiden, JAK2 mutation, Prothrombin gene pending  RFP: 140/3.6/104/26/8/0.47  LFT: AST 72, ALT 29, Alk Phos 110, bili 0.6  EKG: sinus rhythm, 2nd degree AV block, HR 49, , QTc 473  CTH: unremarkable  CTV thrombus in posterior  superior sagittal sinus, straight sinus, R transverse and R sigmoid sinus  MRI brain w/wo: no DWI to suggest infarct, no GRE or contrast enhancement; possible flattening of optic nerve head L>R   MRV intracranial: thrombus in posterior half of superior sagittal sinus, straight sinus, vein of liliya, right sigmoid and transverse sinus into right internal jugular vein and proximal left transverse sinus  TTE: report pending    Assessment and Plan:  #CVST  #Optic disc edema  - Patient with imaging findings as above suggestive of CVST  - Reassuring entrance testing on examination this evening, and no abnormalities on anterior segment evaluation  - On DFE, patient with evidence of disc edema OD (however very brief view limited by patient compliance); unable to assess OS given patient adamantly declined with clear understanding of this decision  - Given evidence of disc edema OD and risk for vision loss, recommend acetazolamide as temporizing measure to reduce ICP. Will defer dosage decision to primary team.  - Recommend IV thiamine 500 mg IV TID, and recommend checking thiamine levels    - Ophthalmology will continue to follow while admitted for repeat entrance testing, when patient is more agreeable, can consider repeat dilated fundus assessment    Discussed with Glenny Damico MD & Ayo Lane MD, PhD    Hugh Wright MD    Ophthalmology Adult Pager - [phone]  Ophthalmology Pediatrics Pager - [phone]    For adult follow-up appointments, call: [phone]  For pediatric follow-up appointments, call: [phone]    NOTE: This note is not finalized until attending reviews and signs.

## 2025-02-13 NOTE — PROGRESS NOTES
"Pharmacy Medication History Review    Shreya Garces is a 34 y.o. female admitted for Acute idiopathic CVST ([facility]). Pharmacy reviewed the patient's [id number] medications and allergies for accuracy.    Medications ADDED:  None  Medications CHANGED:  none  Medications REMOVED:   none     The list below reflects the updated PTA list.   None        The list below reflects the updated allergy list. Please review each documented allergy for additional clarification and justification.  Allergies  Reviewed by Barb Garces PharmD on 2/13/2025        Severity Reactions Comments    Atorvastatin Low Myalgia     Penicillins Low Rash             Patient accepts M2B at discharge.     Sources:   [facility]  Pharmacy dispense history  Patient interview --seemed reluctant to interview, but answered questions with no additional details  Chart Review  Care Everywhere  [facility] plastic surgery notes throughout 2024    Additional Comments:  Pt denies taking any prescription/OTC medications      Barb Garces PharmD  Transitions of Care Pharmacist  02/13/25     Secure Chat preferred   If no response call [phone] or [employer] \"Med Rec\"      "

## 2025-02-13 NOTE — CONSULTS
Vancomycin Dosing by Pharmacy- INITIAL    Shreya Garces is a 34 y.o. year old female who Pharmacy has been consulted for vancomycin dosing for CNS/meningoencephalitis. Based on the patient's indication and renal status this patient will be dosed based on a goal AUC of 500-600.     Renal function is currently stable.    Visit Vitals  BP (!) 149/107   Pulse (!) 39   Temp 36.4 °C (97.5 °F)   Resp 14        Lab Results   Component Value Date    CREATININE 0.47 (L) 2025    CREATININE 0.43 (L) 2025    CREATININE 0.47 (L) 02/10/2025    CREATININE 0.59 06/10/2024        Patient weight is as follows: There were no vitals filed for this visit.    Cultures:  No results found for the encounter in last 14 days.        I/O last 3 completed shifts:  In: 10 [I.V.:10]  Out: -   I/O during current shift:  No intake/output data recorded.    Temp (24hrs), Av.3 °C (97.4 °F), Min:35.9 °C (96.6 °F), Max:36.5 °C (97.7 °F)         Assessment/Plan     Patient will not be given a loading dose.  Will initiate vancomycin maintenance,  1750 mg every 12 hours.    This dosing regimen is predicted by [employer] to result in the following pharmacokinetic parameters:  Loading dose: N/A  Regimen: 1750 mg IV every 12 hours.  Start time: 04:36 on 2025  Exposure target: AUC24 (range)500-600 mg/L.hr   SFL21-21: 503 mg/L.hr  AUC24,ss: 507 mg/L.hr  Probability of AUC24 > 400: 93 %    Follow-up level will be ordered on  at 1000 unless clinically indicated sooner.  Will continue to monitor renal function daily while on vancomycin and order serum creatinine at least every 48 hours if not already ordered.  Follow for continued vancomycin needs, clinical response, and signs/symptoms of toxicity.       HAMIDA MARINA, PharmD

## 2025-02-13 NOTE — ASSESSMENT & PLAN NOTE
Central venous thrombosis with recent worsening of her condition with associated increased bradycardia, severe headache, right optic disc edema and Cushing reflex .   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   Infectious etiology could not be ruled out.  The patient refused all spinal tap    The patient will need to be transferred to tertiary care center for further care with the consideration of performing central venous thromboectomy and starting her on Diamox.     She was accepted at [facility] neuro ICU.  She will be followed up.

## 2025-02-13 NOTE — CONSULTS
Name: Shreya Garces  MRN: [id number]  Encounter Date: 2/13/2025  PCP: No Assigned PCP Generic Provider, MD      Reason for consult: Hypercoagulable work-up   Attending Provider: Dr. 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  Hematology was consulted for Hypercoagulable work-up in the setting of CVST.      Patient had flu-like symptoms for a few weeks and a week (positive for influenza A on 1/31) and half of right eye blurry vision and constant frontal head pressure.  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 On 2/12, there were concerns that the patient had continued headache, worsened lethargy with agitation and worsened vision loss c/f Cushing's reflex. Decision was made to transfer to [facility] for further monitoring. ophthalmology evaluation, and possible neurosurgical intervention for CVST removal.     At [facility]:  Vitals range: /44-1 83/112, HR 37-46, RR 20 -> 8, afebrile, on room air  CBC: 14.9 > 7.1 < 538  INR 1.1  Protein C/S WNL, anticardiolipin WNL, FVIII 226 (H)  Factor V Leiden, JAK2 mutation, Prothrombin gene pending  RFP: 140/3.6/104/26/8/0.47  LFT: AST 72, ALT 29, Alk Phos 110, bili 0.6  EKG: sinus rhythm, 2nd degree AV block, HR 49, , QTc 473  CTH: unremarkable  CTV thrombus in posterior superior sagittal sinus, straight sinus, R transverse and R sigmoid sinus  MRI brain w/wo: no DWI to suggest infarct, no GRE or contrast enhancement; possible flattening of optic nerve head L>R   MRV intracranial: thrombus in  posterior half of superior sagittal sinus, straight sinus, vein of liliya, right sigmoid and transverse sinus into right internal jugular vein and proximal left transverse sinus  TTE: report pending    Hypercoagulable labs were ordered with mostly negative results so far.  Protein C/S, anticardiolipin, lupus anticoagulant WNL, FVIII 226, normal PT and aPTT before heparin drip. Patient has iron-deficiency anemia and elevated platelet count.  Patient denied COVID-19 or other upper respiratory infection in past year. No recent long distance travel in the past month.      Heme History    Patient denied prior history of bleeding or clotting.  Patient has no family history of bleeding or clotting. Patient had HELLP syndrome in 2010 with  demise. 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  2019 note mentioned her echo showed a small PFO at the time.      Past Medical history     Past Medical History:   Diagnosis Date    Other conditions influencing health status     History of back problems    Personal history of other diseases of the circulatory system     History of essential hypertension    Personal history of other diseases of the nervous system and sense organs     History of migraine         Past Surgical History     Past Surgical History:   Procedure Laterality Date     SECTION, CLASSIC  2017     Section    MR HEAD ANGIO WO IV CONTRAST  2019    MR HEAD ANGIO WO IV CONTRAST 2019 [facility]         Family History    No family history on file.      Social History     Social History     Socioeconomic History    Marital status: Single   Tobacco Use    Smoking status: Never    Smokeless tobacco: Never   Vaping Use    Vaping status: Every Day    Substances: Nicotine    Devices: Disposable   Substance and Sexual Activity    Alcohol use: Yes     Comment: Occasionally    Sexual  activity: Yes     Social Drivers of Health     Financial Resource Strain: Low Risk  (2/11/2025)    Overall Financial Resource Strain (CARDIA)     Difficulty of Paying Living Expenses: Not hard at all   Food Insecurity: No Food Insecurity (2/11/2025)    Hunger Vital Sign     Worried About Running Out of Food in the Last Year: Never true     Ran Out of Food in the Last Year: Never true   Transportation Needs: No Transportation Needs (2/11/2025)    PRAPARE - Transportation     Lack of Transportation (Medical): No     Lack of Transportation (Non-Medical): No   Physical Activity: Insufficiently Active (5/17/2021)    Received from [employer]    Exercise Vital Sign     Days of Exercise per Week: 1 day     Minutes of Exercise per Session: 30 min   Stress: No Stress Concern Present (5/17/2021)    Received from [employer]    [employer] - Occupational Stress Questionnaire     Feeling of Stress : Only a little   Social Connections: Moderately Integrated (5/17/2021)    Received from [employer]    Social Connection and Isolation Panel [NHANES]     Frequency of Communication with Friends and Family: More than three times a week     Frequency of Social Gatherings with Friends and Family: More than three times a week     Attends Cheondoism Services: More than 4 times per year     Active Member of Clubs or Organizations: No     Attends Club or Organization Meetings: Never     Marital Status: Living with partner   Intimate Partner Violence: Not At Risk (2/11/2025)    Humiliation, Afraid, Rape, and Kick questionnaire     Fear of Current or Ex-Partner: No     Emotionally Abused: No     Physically Abused: No     Sexually Abused: No   Housing Stability: Low Risk  (2/11/2025)    Housing Stability Vital Sign     Unable to Pay for Housing in the Last Year: No     Number of Times Moved in the Last Year: 1     Homeless in the Last Year: No         Allergies     Allergies    Allergen Reactions    Atorvastatin Myalgia    Penicillins Rash       Medications   acetaZOLAMIDE, 500 mg, BID  cefTRIAXone, 2 g, q12h  folic acid, 1 mg, Daily  iron sucrose, 300 mg, Once  melatonin, 5 mg, Nightly  pantoprazole, 40 mg, Daily  polyethylene glycol, 17 g, Daily  sennosides-docusate sodium, 2 tablet, BID  thiamine, 500 mg, TID  vancomycin, 1,750 mg, q12h      heparin, Last Rate: 1,000 Units/hr (02/13/25 0809)  sodium chloride 0.9%, Last Rate: 75 mL/hr (02/13/25 0400)      acetaminophen, 650 mg, q6h PRN  diphenhydrAMINE, 50 mg, q5 min PRN  hydrALAZINE, 10 mg, q20 min PRN  HYDROmorphone, 0.2 mg, q4h PRN  labetaloL, 10 mg, q10 min PRN  ondansetron, 4 mg, q8h PRN   Or  ondansetron, 4 mg, q8h PRN  vancomycin, , Daily PRN        Review of Systems   Review of Systems   10 pt ROS reviewed and negative aside from above. No bleeding or clotting in particular.    Physical Exam   Blood pressure 120/87, pulse 67, temperature 36.7 °C (98.1 °F), resp. rate 12, weight 69.6 kg (153 lb 7 oz), SpO2 98%.    Gen: awake, alert, lethargic  HEENT: AT/NC, eye closed with recent eye dilation  CV: bradycardia, irregular rhythm  Pulm: CTAB, without w/r/r  Abd: soft, NT/ND  Ext: no LE edema  Skin: warm and dry  Neuro: A&Ox4, moves all 4 extremities spontaneously     Labs     Lab Results   Component Value Date    GLUCOSE 95 02/13/2025    CALCIUM 8.5 (L) 02/13/2025     02/13/2025    K 3.2 (L) 02/13/2025    CO2 29 02/13/2025     02/13/2025    BUN 5 (L) 02/13/2025    CREATININE 0.38 (L) 02/13/2025       Lab Results   Component Value Date    WBC 14.6 (H) 02/13/2025    HGB 7.3 (L) 02/13/2025    HCT 26.8 (L) 02/13/2025    MCV 67 (L) 02/13/2025     (H) 02/13/2025       Lab Results   Component Value Date    ALT 29 02/10/2025    AST 72 (H) 02/10/2025    ALKPHOS 110 02/10/2025    BILITOT 0.6 02/10/2025         Imaging   Study Result    Narrative & Impression   Interpreted By:  Cynthia Maki,  and Zahra Leyva    STUDY:  CT HEAD WO IV CONTRAST;  2/12/2025 8:19 pm      INDICATION:  Signs/Symptoms:Acute CVST, worsening mentation, bradycardia,  bradypnea, hypertension.          COMPARISON:  Head CT 02/10/2025  CT venogram 02/11/2025  MR brain and venography 02/11/2025      ACCESSION NUMBER(S):  [id number]      ORDERING CLINICIAN:  KEHINDE MONTALVO      TECHNIQUE:  Noncontrast axial CT images of head were obtained with coronal and  sagittal reconstructed images.      FINDINGS:  BRAIN PARENCHYMA: Gray-white differentiation is preserved. No  mass-effect, midline shift or effacement of cerebral sulci.      HEMORRHAGE: Again seen similar hyperdense extensive thrombosis in the  posterior half of the superior sagittal sinus, straight sinus, vein  of Conor, right sigmoid and transverse sinus into the right jugular  vein and proximal left transverse sinus (ex. Series 206, image 61 and  62). No acute intraparenchymal hemorrhage.      VENTRICLES and EXTRA-AXIAL SPACES: The ventricular system is  nondilated.      PARANASAL SINUSES/MASTOIDS: Polypoid mucosal thickening of the  left-greater-than-right maxillary sinus, ethmoid air cells, and left  frontal sinus. There is opacification of the left frontal recess and  left ostiomeatal unit complex. Right mastoid air cells are  hypopneumatized and opacified. There is also at least partial  opacification of the right middle ear cavity. Left-sided mastoid air  cells are clear.      CALVARIUM: No depressed skull fracture.          IMPRESSION:  1. Similar findings consistent with extensive dural venous thrombosis.  2. No evidence of large ischemic infarct or intraparenchymal  hemorrhage.  3. Similar paranasal sinus disease. The presence of fluid in the left  maxillary sinus could reflect acute sinusitis in the appropriate  clinical setting.              I personally reviewed the images/study and I agree with the findings  as stated by Gordon Sweeney DO, PGY-3.      MACRO:  None      Signed  by: Cynthia Maki 2/13/2025 5:50 AM  Dictation workstation:   [id number]     TTE complete on 2/11/2025   CONCLUSIONS:   1. Left ventricular ejection fraction is normal, calculated by Willams's biplane at 64%.   2. There is normal right ventricular global systolic function.   3. Right ventricular systolic pressure is within normal limits.    Assessment/Plan     Shreya Garces 34-year-old woman with history of prior lacunar infarct (7/2018), HTN, HLD, migraines, gastric bypass surgery, pre-eclampsia with HELLP syndrome, DUSTIN and depression who presented to [facility] on 2/11 with 10-days of headache, found to have superior sagittal and right sigmoid venous sinus thrombosis, transferred to [facility] for further management after developing worsening headache and vision loss after starting heparin drip.  Hematology was consulted for Hypercoagulable work-up in the setting of CVST.      Currently, work-up for hypercoagulable state is in process but so far negative.  Protein C/S, anticardiolipin, lupus anticoagulant, factor VIII, PT were within normal limits. aPTT was normal before heparin drip. Pending labs include Factor V Leiden, JAK2 mutation, Prothrombin, Antithrombin III antigen, and anti-beta-2 glycoprotein. Please note that lupus anticoagulant and antithrombin III will be impacted by heparin and cannot be interpreted.    Patient also had influenza a few weeks ago which may have contributed to venous sinus thrombosis although this association is not as strong as it is with COVID-19. Patient also has iron deficiency anemia on labs with pencil cells on smear. This is associated with thrombosis but the mechanism of this association is not clear.  Patient is getting iron infusion which may alleviate the anemia.  Previous 2019 note mentioned that patient had a small PFO by echo but recent TTE did not comment on the presence or absence of one. Of note, although there is a history of HELLP, a thrombotic microangiopathy process,  there is no current evidence of a TMA process.     Recommendations:  - send for b2-glycoprotein antibodies  - Continue heparin gtt    Patient seen and discussed with attending physician, Dr. Mcmahon, who agrees with the above.     YOSI FIGUEREDO MS4

## 2025-02-13 NOTE — PROGRESS NOTES
Communication Note    Patient Name: Shreya Garces  MRN: [id number]  Today's Date: 2/13/2025   Room: 07/07    Discipline: Physical Therapy      PT Missed Visit: Yes  Missed Visit Reason: Patient refused (Pt declined PT services due to feeling fatigued and weak after transferring to/from bedside commode.)      02/13/25 at 1:08 PM   Alise Rushing, PT   Rehab Office: [phone]

## 2025-02-14 PROBLEM — H65.90 MIDDLE EAR EFFUSION: Status: ACTIVE | Noted: 2025-02-14

## 2025-02-14 LAB
ALBUMIN SERPL BCP-MCNC: 3.4 G/DL (ref 3.4–5)
AMPHETAMINES UR QL SCN: ABNORMAL
ANCA AB PATTERN SER IF-IMP: NORMAL
ANCA AB PATTERN SER IF-IMP: NORMAL
ANCA IGG TITR SER IF: NORMAL {TITER}
ANCA IGG TITR SER IF: NORMAL {TITER}
ANION GAP SERPL CALC-SCNC: 14 MMOL/L (ref 10–20)
APTT PPP: 38 SECONDS (ref 27–38)
AT III AG ACT/NOR PPP IA: 100 % (ref 82–136)
B2 GLYCOPROT1 IGA SER-ACNC: 1 U/ML
B2 GLYCOPROT1 IGG SER-ACNC: <1.4 U/ML
B2 GLYCOPROT1 IGM SER-ACNC: 1.3 U/ML
BARBITURATES UR QL SCN: ABNORMAL
BASOPHILS # BLD AUTO: 0.07 X10*3/UL (ref 0–0.1)
BASOPHILS NFR BLD AUTO: 0.5 %
BENZODIAZ UR QL SCN: ABNORMAL
BUN SERPL-MCNC: 4 MG/DL (ref 6–23)
BURR CELLS BLD QL SMEAR: NORMAL
BZE UR QL SCN: ABNORMAL
CALCIUM SERPL-MCNC: 8.9 MG/DL (ref 8.6–10.6)
CANNABINOIDS UR QL SCN: ABNORMAL
CHLORIDE SERPL-SCNC: 109 MMOL/L (ref 98–107)
CO2 SERPL-SCNC: 21 MMOL/L (ref 21–32)
COPPER SERPL-MCNC: 119 UG/DL (ref 80–155)
CREAT SERPL-MCNC: 0.6 MG/DL (ref 0.5–1.05)
EGFRCR SERPLBLD CKD-EPI 2021: >90 ML/MIN/1.73M*2
EOSINOPHIL # BLD AUTO: 0.03 X10*3/UL (ref 0–0.7)
EOSINOPHIL NFR BLD AUTO: 0.2 %
ERYTHROCYTE [DISTWIDTH] IN BLOOD BY AUTOMATED COUNT: 26 % (ref 11.5–14.5)
FENTANYL+NORFENTANYL UR QL SCN: ABNORMAL
GLUCOSE SERPL-MCNC: 88 MG/DL (ref 74–99)
HCT VFR BLD AUTO: 30 % (ref 36–46)
HGB BLD-MCNC: 8.2 G/DL (ref 12–16)
HGB RETIC QN: 26 PG (ref 28–38)
HYPOCHROMIA BLD QL SMEAR: NORMAL
IMM GRANULOCYTES # BLD AUTO: 0.26 X10*3/UL (ref 0–0.7)
IMM GRANULOCYTES NFR BLD AUTO: 1.9 % (ref 0–0.9)
IMMATURE RETIC FRACTION: 30 %
INR PPP: 1.1 (ref 0.9–1.1)
LYMPHOCYTES # BLD AUTO: 2.56 X10*3/UL (ref 1.2–4.8)
LYMPHOCYTES NFR BLD AUTO: 19.1 %
MAGNESIUM SERPL-MCNC: 2.23 MG/DL (ref 1.6–2.4)
MCH RBC QN AUTO: 18.1 PG (ref 26–34)
MCHC RBC AUTO-ENTMCNC: 27.3 G/DL (ref 32–36)
MCV RBC AUTO: 66 FL (ref 80–100)
METHADONE UR QL SCN: ABNORMAL
MONOCYTES # BLD AUTO: 1 X10*3/UL (ref 0.1–1)
MONOCYTES NFR BLD AUTO: 7.5 %
MYELOPEROXIDASE AB SER-ACNC: 0 AU/ML (ref 0–19)
MYELOPEROXIDASE AB SER-ACNC: 0 AU/ML (ref 0–19)
NEUTROPHILS # BLD AUTO: 9.46 X10*3/UL (ref 1.2–7.7)
NEUTROPHILS NFR BLD AUTO: 70.8 %
NRBC BLD-RTO: 0.9 /100 WBCS (ref 0–0)
OPIATES UR QL SCN: ABNORMAL
OVALOCYTES BLD QL SMEAR: NORMAL
OXYCODONE+OXYMORPHONE UR QL SCN: ABNORMAL
PCP UR QL SCN: ABNORMAL
PHOSPHATE SERPL-MCNC: 3.3 MG/DL (ref 2.5–4.9)
PLATELET # BLD AUTO: 525 X10*3/UL (ref 150–450)
POLYCHROMASIA BLD QL SMEAR: NORMAL
POTASSIUM SERPL-SCNC: 3.4 MMOL/L (ref 3.5–5.3)
PROTEINASE3 AB SER-ACNC: 0 AU/ML (ref 0–19)
PROTEINASE3 AB SER-ACNC: 0 AU/ML (ref 0–19)
PROTHROMBIN TIME: 12.9 SECONDS (ref 9.8–12.8)
RBC # BLD AUTO: 4.54 X10*6/UL (ref 4–5.2)
RBC MORPH BLD: NORMAL
RETICS #: 0.12 X10*6/UL (ref 0.02–0.08)
RETICS/RBC NFR AUTO: 2.6 % (ref 0.5–2)
SODIUM SERPL-SCNC: 141 MMOL/L (ref 136–145)
UFH PPP CHRO-ACNC: 0.1 IU/ML
WBC # BLD AUTO: 13.4 X10*3/UL (ref 4.4–11.3)
ZINC SERPL-MCNC: 40 UG/DL (ref 60–120)

## 2025-02-14 PROCEDURE — 2500000001 HC RX 250 WO HCPCS SELF ADMINISTERED DRUGS (ALT 637 FOR MEDICARE OP): Performed by: STUDENT IN AN ORGANIZED HEALTH CARE EDUCATION/TRAINING PROGRAM

## 2025-02-14 PROCEDURE — 2500000004 HC RX 250 GENERAL PHARMACY W/ HCPCS (ALT 636 FOR OP/ED): Performed by: STUDENT IN AN ORGANIZED HEALTH CARE EDUCATION/TRAINING PROGRAM

## 2025-02-14 PROCEDURE — 85610 PROTHROMBIN TIME: CPT

## 2025-02-14 PROCEDURE — 83735 ASSAY OF MAGNESIUM: CPT

## 2025-02-14 PROCEDURE — 84425 ASSAY OF VITAMIN B-1: CPT

## 2025-02-14 PROCEDURE — 80307 DRUG TEST PRSMV CHEM ANLYZR: CPT

## 2025-02-14 PROCEDURE — 2500000004 HC RX 250 GENERAL PHARMACY W/ HCPCS (ALT 636 FOR OP/ED)

## 2025-02-14 PROCEDURE — 86146 BETA-2 GLYCOPROTEIN ANTIBODY: CPT | Performed by: STUDENT IN AN ORGANIZED HEALTH CARE EDUCATION/TRAINING PROGRAM

## 2025-02-14 PROCEDURE — 1200000002 HC GENERAL ROOM WITH TELEMETRY DAILY

## 2025-02-14 PROCEDURE — 99221 1ST HOSP IP/OBS SF/LOW 40: CPT | Performed by: OTOLARYNGOLOGY

## 2025-02-14 PROCEDURE — 2500000005 HC RX 250 GENERAL PHARMACY W/O HCPCS

## 2025-02-14 PROCEDURE — 2500000001 HC RX 250 WO HCPCS SELF ADMINISTERED DRUGS (ALT 637 FOR MEDICARE OP)

## 2025-02-14 PROCEDURE — 36415 COLL VENOUS BLD VENIPUNCTURE: CPT | Performed by: STUDENT IN AN ORGANIZED HEALTH CARE EDUCATION/TRAINING PROGRAM

## 2025-02-14 PROCEDURE — 85025 COMPLETE CBC W/AUTO DIFF WBC: CPT

## 2025-02-14 PROCEDURE — 36415 COLL VENOUS BLD VENIPUNCTURE: CPT

## 2025-02-14 PROCEDURE — 80069 RENAL FUNCTION PANEL: CPT

## 2025-02-14 PROCEDURE — 97165 OT EVAL LOW COMPLEX 30 MIN: CPT | Mod: GO

## 2025-02-14 PROCEDURE — 85045 AUTOMATED RETICULOCYTE COUNT: CPT

## 2025-02-14 PROCEDURE — 2500000004 HC RX 250 GENERAL PHARMACY W/ HCPCS (ALT 636 FOR OP/ED): Mod: JW | Performed by: STUDENT IN AN ORGANIZED HEALTH CARE EDUCATION/TRAINING PROGRAM

## 2025-02-14 PROCEDURE — 85520 HEPARIN ASSAY: CPT | Performed by: STUDENT IN AN ORGANIZED HEALTH CARE EDUCATION/TRAINING PROGRAM

## 2025-02-14 PROCEDURE — 97535 SELF CARE MNGMENT TRAINING: CPT | Mod: GO

## 2025-02-14 RX ORDER — POTASSIUM CHLORIDE 1.5 G/1.58G
40 POWDER, FOR SOLUTION ORAL ONCE
Status: COMPLETED | OUTPATIENT
Start: 2025-02-14 | End: 2025-02-14

## 2025-02-14 RX ORDER — OXYCODONE HYDROCHLORIDE 5 MG/1
10 TABLET ORAL EVERY 6 HOURS PRN
Status: DISCONTINUED | OUTPATIENT
Start: 2025-02-14 | End: 2025-02-15

## 2025-02-14 RX ORDER — PHENYLEPHRINE HYDROCHLORIDE 25 MG/ML
1 SOLUTION/ DROPS OPHTHALMIC ONCE
Status: COMPLETED | OUTPATIENT
Start: 2025-02-14 | End: 2025-02-14

## 2025-02-14 RX ORDER — HEPARIN SODIUM 10000 [USP'U]/100ML
0-4500 INJECTION, SOLUTION INTRAVENOUS CONTINUOUS
Status: DISCONTINUED | OUTPATIENT
Start: 2025-02-14 | End: 2025-02-14

## 2025-02-14 RX ORDER — THIAMINE HYDROCHLORIDE 100 MG/ML
500 INJECTION, SOLUTION INTRAMUSCULAR; INTRAVENOUS 3 TIMES DAILY
Status: DISCONTINUED | OUTPATIENT
Start: 2025-02-14 | End: 2025-02-14

## 2025-02-14 RX ORDER — HEPARIN SODIUM 10000 [USP'U]/100ML
0-4500 INJECTION, SOLUTION INTRAVENOUS CONTINUOUS
Status: CANCELLED | OUTPATIENT
Start: 2025-02-14 | End: 2025-02-14

## 2025-02-14 RX ORDER — HYDROMORPHONE HYDROCHLORIDE 1 MG/ML
0.2 INJECTION, SOLUTION INTRAMUSCULAR; INTRAVENOUS; SUBCUTANEOUS ONCE
Status: COMPLETED | OUTPATIENT
Start: 2025-02-14 | End: 2025-02-14

## 2025-02-14 RX ORDER — FLUTICASONE PROPIONATE 50 MCG
2 SPRAY, SUSPENSION (ML) NASAL DAILY
Qty: 16 G | Refills: 3 | Status: CANCELLED | OUTPATIENT
Start: 2025-02-14 | End: 2025-03-16

## 2025-02-14 RX ORDER — FLUTICASONE PROPIONATE 50 MCG
2 SPRAY, SUSPENSION (ML) NASAL DAILY
Status: DISCONTINUED | OUTPATIENT
Start: 2025-02-14 | End: 2025-02-17 | Stop reason: HOSPADM

## 2025-02-14 RX ORDER — OXYMETAZOLINE HCL 0.05 %
2 SPRAY, NON-AEROSOL (ML) NASAL 3 TIMES DAILY
Status: CANCELLED | OUTPATIENT
Start: 2025-02-14 | End: 2025-02-17

## 2025-02-14 RX ORDER — OXYCODONE HYDROCHLORIDE 5 MG/1
5 TABLET ORAL EVERY 6 HOURS PRN
Status: DISCONTINUED | OUTPATIENT
Start: 2025-02-14 | End: 2025-02-15

## 2025-02-14 RX ORDER — ENOXAPARIN SODIUM 100 MG/ML
1 INJECTION SUBCUTANEOUS 2 TIMES DAILY
Status: DISCONTINUED | OUTPATIENT
Start: 2025-02-14 | End: 2025-02-14

## 2025-02-14 RX ORDER — ACETAMINOPHEN 325 MG/1
650 TABLET ORAL EVERY 6 HOURS PRN
Qty: 30 TABLET | Refills: 0 | Status: CANCELLED | OUTPATIENT
Start: 2025-02-14

## 2025-02-14 RX ORDER — ACETAZOLAMIDE 250 MG/1
500 TABLET ORAL 2 TIMES DAILY
Qty: 60 TABLET | Refills: 11 | Status: CANCELLED | OUTPATIENT
Start: 2025-02-14 | End: 2026-02-14

## 2025-02-14 RX ORDER — LABETALOL HYDROCHLORIDE 5 MG/ML
10 INJECTION, SOLUTION INTRAVENOUS EVERY 10 MIN PRN
Status: CANCELLED | OUTPATIENT
Start: 2025-02-14 | End: 2025-02-14

## 2025-02-14 RX ORDER — SODIUM CHLORIDE, SODIUM LACTATE, POTASSIUM CHLORIDE, CALCIUM CHLORIDE 600; 310; 30; 20 MG/100ML; MG/100ML; MG/100ML; MG/100ML
75 INJECTION, SOLUTION INTRAVENOUS CONTINUOUS
Status: ACTIVE | OUTPATIENT
Start: 2025-02-14 | End: 2025-02-15

## 2025-02-14 RX ORDER — MAGNESIUM SULFATE 1 G/100ML
1 INJECTION INTRAVENOUS ONCE
Status: COMPLETED | OUTPATIENT
Start: 2025-02-14 | End: 2025-02-14

## 2025-02-14 RX ORDER — FOLIC ACID 1 MG/1
1 TABLET ORAL DAILY
Qty: 30 TABLET | Refills: 11 | Status: CANCELLED | OUTPATIENT
Start: 2025-02-15 | End: 2026-02-10

## 2025-02-14 RX ORDER — LANOLIN ALCOHOL/MO/W.PET/CERES
100 CREAM (GRAM) TOPICAL DAILY
Status: DISCONTINUED | OUTPATIENT
Start: 2025-02-15 | End: 2025-02-17 | Stop reason: HOSPADM

## 2025-02-14 RX ORDER — POTASSIUM CHLORIDE 1.5 G/1.58G
20 POWDER, FOR SOLUTION ORAL ONCE
Status: DISCONTINUED | OUTPATIENT
Start: 2025-02-14 | End: 2025-02-14

## 2025-02-14 RX ORDER — TROPICAMIDE 10 MG/ML
1 SOLUTION/ DROPS OPHTHALMIC ONCE
Status: DISCONTINUED | OUTPATIENT
Start: 2025-02-14 | End: 2025-02-17 | Stop reason: HOSPADM

## 2025-02-14 RX ORDER — HYDRALAZINE HYDROCHLORIDE 20 MG/ML
10 INJECTION INTRAMUSCULAR; INTRAVENOUS
Status: CANCELLED | OUTPATIENT
Start: 2025-02-14 | End: 2025-02-14

## 2025-02-14 RX ORDER — HYDROMORPHONE HYDROCHLORIDE 1 MG/ML
0.2 INJECTION, SOLUTION INTRAMUSCULAR; INTRAVENOUS; SUBCUTANEOUS
Status: DISCONTINUED | OUTPATIENT
Start: 2025-02-14 | End: 2025-02-15

## 2025-02-14 RX ORDER — LANOLIN ALCOHOL/MO/W.PET/CERES
100 CREAM (GRAM) TOPICAL DAILY
Status: DISCONTINUED | OUTPATIENT
Start: 2025-02-16 | End: 2025-02-14

## 2025-02-14 RX ADMIN — PHENYLEPHRINE HYDROCHLORIDE 1 DROP: 25 SOLUTION/ DROPS OPHTHALMIC at 11:30

## 2025-02-14 RX ADMIN — VANCOMYCIN HYDROCHLORIDE 1750 MG: 5 INJECTION, POWDER, LYOPHILIZED, FOR SOLUTION INTRAVENOUS at 04:43

## 2025-02-14 RX ADMIN — HYDROMORPHONE HYDROCHLORIDE 0.2 MG: 1 INJECTION, SOLUTION INTRAMUSCULAR; INTRAVENOUS; SUBCUTANEOUS at 17:17

## 2025-02-14 RX ADMIN — OXYCODONE 10 MG: 5 TABLET ORAL at 09:33

## 2025-02-14 RX ADMIN — ACETAMINOPHEN 650 MG: 325 TABLET ORAL at 05:21

## 2025-02-14 RX ADMIN — DIPHENHYDRAMINE HYDROCHLORIDE 50 MG: 50 INJECTION INTRAMUSCULAR; INTRAVENOUS at 05:21

## 2025-02-14 RX ADMIN — PANTOPRAZOLE SODIUM 40 MG: 40 INJECTION, POWDER, FOR SOLUTION INTRAVENOUS at 09:18

## 2025-02-14 RX ADMIN — HYDROMORPHONE HYDROCHLORIDE 0.2 MG: 1 INJECTION, SOLUTION INTRAMUSCULAR; INTRAVENOUS; SUBCUTANEOUS at 04:42

## 2025-02-14 RX ADMIN — DEXTROSE MONOHYDRATE 500 MG: 50 INJECTION, SOLUTION INTRAVENOUS at 13:54

## 2025-02-14 RX ADMIN — APIXABAN 5 MG: 5 TABLET, FILM COATED ORAL at 21:10

## 2025-02-14 RX ADMIN — Medication 5 MG: at 21:10

## 2025-02-14 RX ADMIN — OXYCODONE 10 MG: 5 TABLET ORAL at 23:03

## 2025-02-14 RX ADMIN — ACETAZOLAMIDE 500 MG: 250 TABLET ORAL at 22:44

## 2025-02-14 RX ADMIN — OXYCODONE 10 MG: 5 TABLET ORAL at 15:10

## 2025-02-14 RX ADMIN — ACETAZOLAMIDE 500 MG: 250 TABLET ORAL at 09:19

## 2025-02-14 RX ADMIN — HYDROMORPHONE HYDROCHLORIDE 0.2 MG: 1 INJECTION, SOLUTION INTRAMUSCULAR; INTRAVENOUS; SUBCUTANEOUS at 21:03

## 2025-02-14 RX ADMIN — SENNOSIDES AND DOCUSATE SODIUM 2 TABLET: 50; 8.6 TABLET ORAL at 09:18

## 2025-02-14 RX ADMIN — THIAMINE HYDROCHLORIDE 500 MG: 100 INJECTION, SOLUTION INTRAMUSCULAR; INTRAVENOUS at 09:18

## 2025-02-14 RX ADMIN — FOLIC ACID 1 MG: 1 TABLET ORAL at 09:18

## 2025-02-14 RX ADMIN — HYDROMORPHONE HYDROCHLORIDE 0.2 MG: 1 INJECTION, SOLUTION INTRAMUSCULAR; INTRAVENOUS; SUBCUTANEOUS at 07:57

## 2025-02-14 RX ADMIN — POTASSIUM CHLORIDE 40 MEQ: 1.5 POWDER, FOR SOLUTION ORAL at 09:34

## 2025-02-14 RX ADMIN — HYDROMORPHONE HYDROCHLORIDE 0.2 MG: 1 INJECTION, SOLUTION INTRAMUSCULAR; INTRAVENOUS; SUBCUTANEOUS at 00:17

## 2025-02-14 RX ADMIN — SODIUM CHLORIDE, POTASSIUM CHLORIDE, SODIUM LACTATE AND CALCIUM CHLORIDE 75 ML/HR: 600; 310; 30; 20 INJECTION, SOLUTION INTRAVENOUS at 08:12

## 2025-02-14 RX ADMIN — HYDROMORPHONE HYDROCHLORIDE 0.2 MG: 1 INJECTION, SOLUTION INTRAMUSCULAR; INTRAVENOUS; SUBCUTANEOUS at 11:03

## 2025-02-14 RX ADMIN — ACETAMINOPHEN 650 MG: 325 TABLET ORAL at 18:36

## 2025-02-14 RX ADMIN — HYDROMORPHONE HYDROCHLORIDE 0.2 MG: 1 INJECTION, SOLUTION INTRAMUSCULAR; INTRAVENOUS; SUBCUTANEOUS at 09:15

## 2025-02-14 RX ADMIN — DEXAMETHASONE SODIUM PHOSPHATE 4 MG: 4 INJECTION, SOLUTION INTRA-ARTICULAR; INTRALESIONAL; INTRAMUSCULAR; INTRAVENOUS; SOFT TISSUE at 11:29

## 2025-02-14 RX ADMIN — CEFTRIAXONE SODIUM 2 G: 2 INJECTION, SOLUTION INTRAVENOUS at 09:15

## 2025-02-14 RX ADMIN — MAGNESIUM SULFATE HEPTAHYDRATE 1 G: 10 INJECTION, SOLUTION INTRAVENOUS at 11:29

## 2025-02-14 RX ADMIN — HYDROMORPHONE HYDROCHLORIDE 0.2 MG: 1 INJECTION, SOLUTION INTRAMUSCULAR; INTRAVENOUS; SUBCUTANEOUS at 14:00

## 2025-02-14 ASSESSMENT — COGNITIVE AND FUNCTIONAL STATUS - GENERAL
DRESSING REGULAR LOWER BODY CLOTHING: A LITTLE
WALKING IN HOSPITAL ROOM: A LITTLE
DAILY ACTIVITIY SCORE: 21
MOBILITY SCORE: 22
DAILY ACTIVITIY SCORE: 24
HELP NEEDED FOR BATHING: A LITTLE
TOILETING: A LITTLE
CLIMB 3 TO 5 STEPS WITH RAILING: A LITTLE

## 2025-02-14 ASSESSMENT — ACTIVITIES OF DAILY LIVING (ADL)
ADL_ASSISTANCE: INDEPENDENT
BATHING_ASSISTANCE: STAND BY
BATHING_LEVEL_OF_ASSISTANCE: CONTACT GUARD
HOME_MANAGEMENT_TIME_ENTRY: 10

## 2025-02-14 ASSESSMENT — PAIN SCALES - GENERAL
PAINLEVEL_OUTOF10: 7
PAINLEVEL_OUTOF10: 4
PAINLEVEL_OUTOF10: 7
PAINLEVEL_OUTOF10: 4
PAINLEVEL_OUTOF10: 6
PAINLEVEL_OUTOF10: 7
PAINLEVEL_OUTOF10: 8
PAINLEVEL_OUTOF10: 7
PAINLEVEL_OUTOF10: 6
PAINLEVEL_OUTOF10: 10 - WORST POSSIBLE PAIN

## 2025-02-14 ASSESSMENT — PAIN - FUNCTIONAL ASSESSMENT
PAIN_FUNCTIONAL_ASSESSMENT: 0-10

## 2025-02-14 ASSESSMENT — PAIN DESCRIPTION - LOCATION
LOCATION: HEAD

## 2025-02-14 ASSESSMENT — PAIN DESCRIPTION - ORIENTATION
ORIENTATION: RIGHT
ORIENTATION: RIGHT

## 2025-02-14 ASSESSMENT — PAIN DESCRIPTION - DESCRIPTORS: DESCRIPTORS: PRESSURE

## 2025-02-14 NOTE — PROGRESS NOTES
Past 24 hours:  No acute event overnight      Subjective    Continue to complains of severe headache     Objective   Heart Rate:  [45-83]   Temp:  [35.9 °C (96.6 °F)-36.6 °C (97.9 °F)]   Resp:  [9-23]   BP: (118-166)/()   SpO2:  [96 %-100 %]     PHYSICAL EXAM:  GENERAL APPEARANCE:  No distress, alert, interactive and cooperative    MENTAL STATE:   Orientation was normal to time, place and person. Recent and remote memory was intact.  Attention span and concentration were normal. Language testing was normal for comprehension, repetition, expression, and naming. The patient could correctly interpret a picture. General fund of knowledge was intact.     OPHTHALMOSCOPIC:   Optic disc edema bilaterally.     CRANIAL NERVES:   CN 2   Visual fields full to confrontation.   CN 3, 4, 6   Pupils round, 4 mm in diameter, equally reactive to light. Lids symmetric; no ptosis. EOMs normal alignment, full range with normal saccades, pursuit and convergence.   No nystagmus.   CN 5   Facial sensation intact bilaterally.   CN 7   Normal and symmetric facial strength. Nasolabial folds symmetric.   CN 8   Hearing intact to finger rub.   CN 9   Palate elevates symmetrically.   CN 11   Normal strength of shoulder shrug and neck turning.   CN 12   Tongue midline, with normal bulk and strength; no fasciculations.     MOTOR:   Muscle bulk and tone were normal in both upper and lower extremities.   No fasciculations, tremor or other abnormal movements were present.     R          L  Deltoids 5 5  Biceps  5 5  Triceps  5 5  Wrist Flex 5 5  Wrist Ext 5 5    Hip Flex 5 5  Knee Flex 5 5  Knee Ext 5 5  Dorsiflex 5 5  Plantarflex 5 5    REFLEXES:   R          L  BR:  2 2  Biceps:  2 2  Triceps:  2 2  Knee:  2 2  Ankle:  2 2    Babinski: toes downgoing to plantar stimulation. No clonus or other pathologic reflexes present.   Ware  Adductor     SENSORY:   In both upper and lower extremities, sensation was intact to light  touch    COORDINATION:    In both upper extremities, finger-nose-finger was intact without dysmetria or overshoot.   In both lower extremities, heel-to-shin was intact. DOUGLAS were intact in both upper and lower extremities.      GAIT:   Station was stable with a normal base. Gait was stable with a normal arm swing and speed. No ataxia, shuffling, steppage or waddling was present. No circumduction was present. Tandem gait was intact. No Romberg sign was present.      MEDICATIONS:  Scheduled: PRN: Continuous:   acetaZOLAMIDE, 500 mg, oral, BID  cefTRIAXone, 2 g, intravenous, q12h  folic acid, 1 mg, oral, Daily  HYDROmorphone, 0.4 mg, intravenous, Once  melatonin, 5 mg, oral, Nightly  pantoprazole, 40 mg, intravenous, Daily  polyethylene glycol, 17 g, oral, Daily  potassium chloride, 40 mEq, oral, Once   Followed by  potassium chloride, 20 mEq, oral, Once  sennosides-docusate sodium, 2 tablet, oral, BID  thiamine, 500 mg, intravenous, TID   Followed by  [START ON 2/16/2025] thiamine, 100 mg, oral, Daily  vancomycin, 1,750 mg, intravenous, q12h     PRN medications: acetaminophen, diphenhydrAMINE, hydrALAZINE, HYDROmorphone, labetaloL, ondansetron **OR** ondansetron, oxyCODONE, oxyCODONE, vancomycin heparin, 0-4,500 Units/hr, Last Rate: 1,000 Units/hr (02/14/25 0900)  lactated Ringer's, 75 mL/hr, Last Rate: 75 mL/hr (02/14/25 0900)         LAB RESULTS:  [unfilled]  Results from last 72 hours   Lab Units 02/14/25  0446 02/13/25  0016   WBC AUTO x10*3/uL 13.4* 14.6*   NRBC AUTO /100 WBCs 0.9* 0.3*   RBC AUTO x10*6/uL 4.54 4.01   HEMOGLOBIN g/dL 8.2* 7.3*   HEMATOCRIT % 30.0* 26.8*   MCV fL 66* 67*   MCH pg 18.1* 18.2*   MCHC g/dL 27.3* 27.2*   RDW % 26.0* 24.2*   PLATELETS AUTO x10*3/uL 525* 530*   NEUTROS PCT AUTO % 70.8 78.8   IG PCT AUTO % 1.9* 1.3*   LYMPHS PCT AUTO % 19.1 12.9   MONOS PCT AUTO % 7.5 6.6   EOS PCT AUTO % 0.2 0.1   BASOS PCT AUTO % 0.5 0.3   NEUTROS ABS x10*3/uL 9.46* 11.54*   IG AUTO x10*3/uL 0.26 0.19    LYMPHS ABS AUTO x10*3/uL 2.56 1.89   MONOS ABS AUTO x10*3/uL 1.00 0.96   EOS ABS AUTO x10*3/uL 0.03 0.01   BASOS ABS AUTO x10*3/uL 0.07 0.04        IMAGING RESULTS:  CT head wo IV contrast   Final Result   1. Similar findings consistent with extensive dural venous thrombosis.   2. No evidence of large ischemic infarct or intraparenchymal   hemorrhage.   3. Similar paranasal sinus disease. The presence of fluid in the left   maxillary sinus could reflect acute sinusitis in the appropriate   clinical setting.                  I personally reviewed the images/study and I agree with the findings   as stated by Gordon Sweeney DO, PGY-3.        MACRO:   None        Signed by: Cynthia Maki 2/13/2025 5:50 AM   Dictation workstation:   [id number]             Assessment/Plan    34 year-old female with past medical history of prior lacunar infarct (7/2018), hypertension, hyperlipidemia, migraines, DUSTIN, and depression who presented to OSH ED on 2/11/25 with 10-day history of headache. 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 Patient transferred to [facility] for further neurological management after developing worsening headache and vision loss after initiating heparin infusion.     Per chart review, patient presented to OSH ED with 3-week history of flu-like symptoms and 1.5 weeks of right eye blurry vision with constant frontal head pressure. Patient found to have superior sagittal to right sigmoid sinus CVST. On 2/12/25, patient with continued headache, worsened lethargy with agitation, and worsened vision loss concerning for Cushing's reflex. Decision made to transfer to [facility] for further monitoring, ophthalmology evaluation, and possible neurosurgical intervention for CVST removal.  Repeat CTH on arrival to [facility] 2/12 demonstrated no bleeding.    Updates 02/14/25:  - Increase dilaudid   - Consult ENT for right ear pain and hearing loss    #CVST within posterior superior  sagittal, straight, right transverse and right sigmoid sinus  #Prior lacunar infarcts  #Concern for meningitis  #Migraines  Assessment:  - Neurologically: see above  - See above history and significant events  - Bradycardic (since admission), hypertensive, bradypneic, decreased mentation vs. agitation  - CTH on arrival 2/12: no bleeding  Plan:  - Floor  - Low intensity heparin infusion with PRN bolus  - Continue Diamox 500mg BID for increased ICP  - No LP at this time, will continue empiric Abx for meningoencephalitis and follow BCx  - Folic acid 1mg, melatonin 5mg nightly  - Neuro Checks: Q4H  - Sedation: None  - Pain: acetaminophen PRN and hydromorphone PRN  - Nausea: ondansetron  - PT/OT/SLP    #HTN  #HLD  #Bradycardia, asymptomatic  - ECHO 2/11 - LVEF 64% (Willams's biplane), normal right global systolic function, RVSP normal  - Continue to monitor on telemetry  - BP goal: SBP >100, SBP <180  - Concern for cushing reflex in setting of CVST    #Microcytic hypoproliferative anemia  Assessment:  - Baseline Hgb: 9.8  - Baseline Plts: 307  - TIBC 2.7%  - Reticulocyte Production Index (RPI) 1.0 (inadequate repsonse)  - Corrected Reticulocyte Percentage 1.47%  Results from last 7 days   Lab Units 02/14/25  0446 02/13/25  0016 02/12/25  1940   HEMOGLOBIN g/dL 8.2* 7.3* 7.6*   HEMATOCRIT % 30.0* 26.8* 26.9*   PLATELETS AUTO x10*3/uL 525* 530* 540*     Plan:  - Continue to monitor with daily CBC and Coag panel  - Resume Venofer 300mg IV for 1 additional dose (3 total on 2/11, 2/12, 2/13)  - Hemoglobin electrophoresis/identification sent per recommendation of Cardiology at OSH  - Peripheral smear sent  - Hypercoagulable workup pending  - Sent anti beta-2 glycoprotein    Pain medications: PRN Dilaudid Tylenol Oxycodone  Fluids: Replete PRN  Electrolytes: Keep mg >2, phos >3  and K >4  Nutrition:  Adult diet Regular   Antimicrobials: Ceftriaxone  Anticoagulation: High dose heparin  DVT PPX: Heparin Drip  GI ppx: Pantoprazole  40mg daily  Bowel care: Miralax  Catheter: None  Lines: PIV  Oxygen: Room Air    Disposition:   Pending    Code Status: Full Code (confirmed on admission)   NOK:  Primary Emergency Contact: Melisa Garces, Home Phone: [phone]     Matthew Tran MD  PGY-2 Neurology

## 2025-02-14 NOTE — PROGRESS NOTES
Shreya Garces is a 34 y.o. female on day 2 of admission presenting with Acute idiopathic CVST ([facility]).      Subjective   This AM the pt reports stable vision and headache. Does not feel her symptoms have significantly improved or worsened. She is more interactive on interview and examination.       Objective     Last Recorded Vitals  Blood pressure 125/86, pulse 64, temperature 36.9 °C (98.4 °F), temperature source Temporal, resp. rate 13, weight 69.6 kg (153 lb 7 oz), SpO2 97%.    Physical Exam  Base Eye Exam       Visual Acuity (Snellen - Linear)         Right Left    Near sc 20/20 20/20              Tonometry (Tonopen, 11:01 AM)         Right Left    Pressure 12 15              Pupils         Dark Light Shape React APD    Right 5 3 Round Brisk None    Left 5 3 Round Brisk None              Visual Fields         Left Right     Full Full              Extraocular Movement         Right Left     Full Full                  Additional Tests       Color         Right Left    Ishihara 11/11 11/11                  Slit Lamp and Fundus Exam       Fundus Exam         Right Left    Disc Grade II edema Grade II edema    C/D Ratio Unable Unable    Macula Good reflex Good reflex    Vessels Normal course and caliber Normal course and caliber    Periphery Attached 360 Attached 360                    Relevant Results                                     Assessment/Plan   Assessment & Plan  Acute idiopathic CVST ([facility])    Middle ear effusion    This 34 year old woman with history of lacunar stroke (7/2018), HTN, HLD, migraine, HELLP syndrome, NAFLD, prediabetes, and gastric bypass surgery is admitted for CVST. Initial exam revealed papilledema OD and patient refused exam OS. Currently undergoing treatment with heparin and hypercoagulability workup. Also receiving acetazolamide as temporizing treatment to protect against optic neuropathy during period of elevated ICP.     Although the most likely unifying diagnosis is induced  CVST iso of recent flu and possible underlying hypercoagulability, the combination of encephalopathy, prior stroke and hearing loss raise the possibility of Susac's Syndrome. Susac's Syndrome-associated CVST has been described in the literature (https://onlinelibrary.patterson.com/doi/10.1155/2020/0761151). There are no retina findings currently to support this diagnosis and previous MRI does not show associated findings, but outpatient retina evaluation is worthwhile.      Update 2/14/25: Patient amenable to DFE today revealing grade 2 optic disc edema OU. Recommend continued acetazolamide therapy.      Plan:  - Recommend continuing acetazolamide 500 BID  - Recommend completing course of thiamine and following up thiamine level  - Ophthalmology will continue to follow        Bimal Osorio M.D.  Ophthalmology, PGY3     Ophthalmology Adult Pager - [phone]  Ophthalmology Pediatrics Pager - [phone]     For adult follow-up appointments, call: [phone]  For pediatric follow-up appointments, call: [phone]     NOTE: This note is not finalized until attending reviews and signs.                 MD Bimal Curry MD

## 2025-02-14 NOTE — DISCHARGE INSTRUCTIONS
Dear Shreya Garces,    You presented to [facility] with headache for 2 weeks.    Work-up of your presenting symptoms demonstrated showed clots in the major veins of the brain causing cerebral venous sinus thrombosis. We started you on a blood thinner drip and transitioned you to eliquis.     Start taking apixaban 5 mg twice daily (for the clots in your brain) - please don't miss any doses.   Start taking acetazolamide 500 mg twice daily (for your vision/increased pressure in your brain)   Start taking valproic acid 250 mg twice daily for your headaches and tylenol as needed.     We will have you get a repeat scan of your brain to evaluate for improvement of the clots.     We will have you follow up with Dr. Basilio (stroke neurology) in the next 3 weeks to determine your long term blood thinner regimen. Follow up with Dr. Lane (ophthalmology) in 2 weeks. Follow up with a PCP. Our SW is assisting you with medicaid.     Please come to the emergency department if you develop any new neurologic symptoms, including new worsening headaches, vision loss, numbness, tingling, weakness.     We wish you all the best,  [facility] Inpatient Stroke Team

## 2025-02-14 NOTE — PROGRESS NOTES
Communication Note    Patient Name: Shreya Garces  MRN: [id number]  Today's Date: 2/14/2025   Room: 07/07A    Discipline: Physical Therapy      PT Missed Visit: Yes  Missed Visit Reason:  (MD present at bedside, pt in tears calling out in pain. Hold PT at this time.)      02/14/25 at 8:59 AM   Alise Rushing PT   Rehab Office: [phone]

## 2025-02-14 NOTE — CONSULTS
Reason For Consult  Right     History Of Present Illness  Shreya Garces is a 34 y.o. female with history of HTN, migraine, HLD, and prior lacunar stroke who presented with 3 weeks of flu like symptoms and 10 days of headache, found to have extensive venous sinus thrombosis extending to involve posterior half of superior sagittal sinus, straight sinus, right sigmoid and transverse sinus into the right jugular vein. She is on heparin. ENT was consulted for right ear pain.     Upon interview, patient states she has been experiencing persistent right ear fullness and muffled hearing since the time of headache onset. She also endorses occasional vertigo that lasts a couple minutes, no known provoking factors or discernible patterns. Also has blurry vision in right eye. Denies mj otalgia, describes a feeling of fullness / blocked ear. Also denies otorrhea or tinnitus. No prior surgeries to head or neck.      Past Medical History  She has a past medical history of Other conditions influencing health status, Personal history of other diseases of the circulatory system, and Personal history of other diseases of the nervous system and sense organs.    Surgical History  She has a past surgical history that includes  section, classic (2017) and MR angio head wo IV contrast (2019).     Social History  She reports that she has never smoked. She has never used smokeless tobacco. She reports current alcohol use.  Drug: Marijuana.    Family History  No family history on file.     Allergies  Atorvastatin and Penicillins    Physical Exam  PHYSICAL EXAMINATION:  Constitutional:  No acute distress  Voice:  No hoarseness or other abnormality  Respiration:  Breathing comfortably, no stridor  Cardiovascular:  well perfused  Eyes:  EOM grossly intact  Neuro:  Alert and oriented times 3, Cranial nerves V and VII intact and symmetric   Head and Face:  Symmetric facial features  Right Ear:  Normal external ear,  external auditory canal, TM mildly opaque with serous effusion, normal hearing to voice.  Left Ear: Normal external ear, external auditory canal, and TM to otoscopy, normal hearing to whispered voice.  T[place] Exam: [place] - Localizes to right; [place] - AC>BC bilaterally  Nose:  External nose midline, no obvious drainage  Oral Cavity/Oropharynx/Lips:  Normal mucous membranes  Skin:  Neck skin is without scar or injury  Psych:  Alert and oriented with appropriate mood and affect       Last Recorded Vitals  Blood pressure (!) 134/108, pulse 59, temperature 36 °C (96.8 °F), temperature source Temporal, resp. rate 10, weight 69.6 kg (153 lb 7 oz), SpO2 99%.    Relevant Results      CTH 2/12:  - Hypopneumatized right mastoid with opacification of air cells, partial opacification of middle ear   - L>R paranasal sinus      Assessment/Plan     34 year old female with recently diagnosed central venous sinus thrombosis extending from posterior superior sagittal sinus extending to right sigmoid sinus and internal jugular vein. Also with right sided aural fullness in setting of recent flu-like symptoms and right serous middle ear effusion, mastoid effusion and hypopneumatized mastoid seen on CTH. Most likely reactive middle ear effusion.     Recs:  - Flonase 2 sprays daily bilat x 6 weeks  - Follow up outpatient in 4-6 weeks, if effusion persistent at that time can obtain audiogram; we will arrange        Seen with Dr. Schuler.     Sharita Evans MD - PGY-2  Otolaryngology - Head & Neck Surgery  [facility]    ENT Consult pager: [phone]  ENT Peds pager: [phone]  ENT Head & Neck Surgery Phone: [phone]  ENT subspecialty team: Marybel individual resident who wrote today's note  ENT Outpatient scheduling number: [phone]  Please Page if Urgent    I saw the patient with the physician assistant.  The patient has a right-sided serous otitis.  We will give her some Flonase.  The likelihood is that this will resolve on  itself.  We will have her follow-up with one of our otologist.    Stoo Schuler

## 2025-02-14 NOTE — PROGRESS NOTES
Communication Note    Patient Name: Shreya Garces  MRN: [id number]  Today's Date: 2/14/2025   Room: 07/07A    Discipline: Physical Therapy      PT Missed Visit: Yes  Missed Visit Reason:  (Despite education on the importance of out of bed mobility to prevent deconditioning and weakness, pt adamantly declined stating that she feels she is already doing enough mobility by sitting up in bed and transferring to bedside commode.)      02/14/25 at 10:56 AM   Alise Rushing, PT   Rehab Office: [phone]

## 2025-02-14 NOTE — CONSULTS
Vancomycin Dosing by Pharmacy- Cessation of Therapy    Consult to pharmacy for vancomycin dosing has been discontinued by the prescriber, pharmacy will sign off at this time.    Please call pharmacy if there are further questions or re-enter a consult if vancomycin is resumed.     Mayra Mesa, ShabanaD

## 2025-02-14 NOTE — PROGRESS NOTES
Occupational Therapy    Evaluation and Treatment    Patient Name: Shreya Garces  MRN: [id number]  Today's Date: 2/14/2025  Room: 07/07  Time Calculation  Start Time: 1123  Stop Time: 1158  Time Calculation (min): 35 min    Assessment  IP OT Assessment  OT Assessment: Pt is a 33yo female presenting with deficits in coping strategies, functional mobility, transfers and ADLs/IADLs. Pt would benefit from skilled OT intervention to return to OF safely  Prognosis: Good  Barriers to Discharge Home: No anticipated barriers  Evaluation/Treatment Tolerance: Patient tolerated treatment well  Medical Staff Made Aware: Yes  End of Session Communication: Bedside nurse  End of Session Patient Position: Bed, 3 rail up, Alarm on  Plan:  Inpatient Plan  Treatment Interventions: ADL retraining, Functional transfer training, Endurance training, Cognitive reorientation, Patient/family training, Compensatory technique education  OT Frequency: 2 times per week  OT Discharge Recommendations: No OT needed after discharge  OT Recommended Transfer Status: Assist of 1  OT - OK to Discharge: Yes  OT Assessment  OT Assessment Results: Decreased ADL status, Decreased safe judgment during ADL, Decreased functional mobility, Decreased gross motor control, Other (Comment) (coping strategies)  Prognosis: Good  Evaluation/Treatment Tolerance: Patient tolerated treatment well  Medical Staff Made Aware: Yes  Strengths: Ability to acquire knowledge, Capable of completing ADLs semi/independent, Housing layout, Living arrangement secure, Premorbid level of function  Barriers to Participation: Support of extended family/friends, Comorbidities, Coping skills    Subjective   Current Problem:  1. Acute idiopathic CVST (HHS-HCC)        2. Dural venous sinus thrombosis (HHS-HCC)  cefTRIAXone (Rocephin) 2 g in dextrose (iso) IV 50 mL    vancomycin (Vancocin) 1,750 mg in sodium chloride 0.9% 500 mL IV    acetaminophen (Tylenol) tablet 650 mg    vancomycin  (Vancocin) pharmacy to dose - pharmacy monitoring    HYDROmorphone (Dilaudid) injection 0.2 mg    DISCONTINUED: acetaminophen (Tylenol) tablet 975 mg    DISCONTINUED: potassium chloride (Klor-Con) packet 40 mEq    DISCONTINUED: heparin 25,000 Units in dextrose 5% 250 mL (100 Units/mL) infusion (premix)    DISCONTINUED: heparin bolus from bag 2,000-4,000 Units    DISCONTINUED: HYDROmorphone (Dilaudid) injection 0.5 mg    DISCONTINUED: ondansetron (Zofran) injection 4 mg    DISCONTINUED: HYDROmorphone (Dilaudid) injection 0.2 mg    DISCONTINUED: HYDROmorphone (Dilaudid) injection 0.2 mg      3. Iron deficiency anemia, unspecified iron deficiency anemia type  folic acid (Folvite) tablet 1 mg    DISCONTINUED: iron sucrose (Venofer) 300 mg in sodium chloride 0.9% 265 mL IV      4. DUSTIN (generalized anxiety disorder)  DISCONTINUED: hydrOXYzine HCL (Atarax) tablet 25 mg    DISCONTINUED: melatonin tablet 5 mg      5. Fluid level behind tympanic membrane of right ear  Referral to ENT        General:  Reason for Referral: presented to [facility] ED on 2/11/25 with 10-day history of headache. 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 Patient transferred to [facility] for further neurological management after developing worsening headache and vision loss after initiating heparin infusion.  Past Medical History Relevant to Rehab: prior lacunar infarct (7/2018), hypertension, hyperlipidemia, migraines, DUSTIN, and depression  Prior to Session Communication: Bedside nurse  Patient Position Received: Bed, 3 rail up, Alarm off, not on at start of session  Family/Caregiver Present: No  General Comment: Pt supine in bed upon entry to room. Pt eager to get cleaned up, willing to work with OT. Pt reports recent eye dialation, requires mild verbal cues for direction and HHA   Precautions:  Medical Precautions: Fall precautions  Precautions Comment: eyes recently dialetedd, -180  Vital  Signs:    Pain:  Pain Assessment  Pain Assessment: 0-10  0-10 (Numeric) Pain Score: 4  Pain Type: Acute pain  Pain Location: Head  Pain Orientation: Right  Pain Descriptors: Pressure  Lines/Tubes/Drains:         Objective   Cognition:  Overall Cognitive Status: Within Functional Limits  Orientation Level: Oriented X4  Cognition Comments: Pt has been noted to yell out the last few days, but no deficits noted during session. Reports recent end to long term relationship, reports change in support system, reports has some family members that she is having difficulty getting used to needing people and being alone. Coping deficits rather than cognitive noted. Willing to open up and talk with this therapist  Insight: Mild  Impulsive: Mildly           Home Living:  Type of Home: Apartment  Lives With: Alone (2 cats Blalenny and Dawood)  Home Adaptive Equipment: None  Home Layout: One level  Home Access: Stairs to enter with rails  Entrance Stairs-Number of Steps: 2 stories  Bathroom Shower/Tub: Tub/shower unit  Bathroom Toilet: Standard  Bathroom Equipment: None   Prior Function:  Level of [place]: Independent with ADLs and functional transfers, Independent with homemaking with ambulation  Receives Help From:  (reports family has been able to help lately, reports change in support system with end of long term relationship)  ADL Assistance: Independent  Homemaking Assistance: Independent  Ambulatory Assistance: Independent  Vocational: Full time employment ([employer])  Leisure: reports difficulty identifying hobbies lately  Hand Dominance: Left  Prior Function Comments: drives, works full time/overtime  IADL History:     ADL:  Eating Assistance: Independent  Grooming Assistance: Stand by  Bathing Assistance: Stand by  UE Dressing Assistance: Stand by  LE Dressing Assistance: Stand by  Toileting Assistance with Device: Stand by (anticipated)  ADL Comments: See treatment for breakdown  Activity Tolerance:  Endurance: Tolerates  30 min exercise with multiple rests  Early Mobility/Exercise Safety Screen: Proceed with mobilization - No exclusion criteria met  Balance:     Bed Mobility/Transfers: Bed Mobility/Transfers: Bed Mobility  Bed Mobility: Yes  Bed Mobility 1  Bed Mobility 1: Supine to sitting  Level of Assistance 1: Close supervision  Bed Mobility 2  Bed Mobility  2: Sitting to supine  Level of Assistance 2: Close supervision  Bed Mobility 3  Bed Mobility 3: Scooting  Level of Assistance 3: Close supervision  Bed Mobility Comments 3: boost self up in bed with HOB flat   and Transfers  Transfer: Yes  Transfer 1  Transfer From 1: Bed to  Transfer to 1: Stand  Technique 1: Sit to stand  Transfer Level of Assistance 1: Contact guard, Hand held assistance  Transfers 2  Transfer From 2: Stand to  Transfer to 2: Chair with arms  Technique 2: Stand to sit  Transfer Level of Assistance 2: Contact guard  Transfers 3  Transfer From 3: Chair with arms to  Transfer to 3: Bed  Technique 3: Sit to stand, Stand to sit  Transfer Level of Assistance 3: Hand held assistance, Contact guard  IADL's:      Vision:     and Vision - Complex Assessment  Vision Comments: reports blurred vision in R eye only    Strength:  Strength Comments: BUE WFL  Perception:  Inattention/Neglect: Appears intact  Coordination:  Finger to Target: Intact   Hand Function:  Hand Function  Gross Grasp: Functional  Coordination: Functional  Extremities:   RUE   RUE : Within Functional Limits, LUE   LUE: Within Functional Limits,  , and      Treatment Completed on Evaluation  Cognitive Skill Development:  Cognitive Skill Development Activity 1: Extended time to address coping strategies such as reframing, identifying positive compenents and here now strategies. Pt appears open to further discussion for coping strategies    Activities of Daily Living: Feeding  Feeding Level of Assistance: Independent  Grooming  Grooming Level of Assistance: Setup  UE Bathing  UE Bathing Level of  Assistance: Close supervision  LE Bathing  LE Bathing Level of Assistance: Contact guard  UE Dressing  UE Dressing Level of Assistance: Close supervision    Therapy/Activity:     Therapeutic Activity  Therapeutic Activity Performed: Yes  Therapeutic Activity 1: Pt completes static standing at sink with CGA x12 minutes  Therapeutic Activity 2: Additional functional transfers in room. Tolerates sitting in chair x8 minutes, reporting fatigue, returned to bed       Outcome Measures: [facility] Daily Activity  Putting on and taking off regular lower body clothing: A little  Bathing (including washing, rinsing, drying): A little  Putting on and taking off regular upper body clothing: None  Toileting, which includes using toilet, bedpan or urinal: A little  Taking care of personal grooming such as brushing teeth: None  Eating Meals: None  Daily Activity - Total Score: 21    Confusion Assessment Method-ICU (CAM-ICU)  Feature 1: Acute Onset or Fluctuating Course: Negative  Overall CAM-ICU: Negative   ICU Mobility Screen  Early Mobility/Exercise Safety Screen: Proceed with mobilization - No exclusion criteria met  ICU Mobility Scale: Walking with assistance of 1 person,          Education Documentation  Body Mechanics, taught by Hattie Guillen OT at 2/14/2025  2:30 PM.  Learner: Patient  Readiness: Acceptance  Method: Explanation  Response: Verbalizes Understanding  Comment: coping strategies, OOB benefits, POC    Precautions, taught by Hattie Guillen OT at 2/14/2025  2:30 PM.  Learner: Patient  Readiness: Acceptance  Method: Explanation  Response: Verbalizes Understanding  Comment: coping strategies, OOB benefits, POC    ADL Training, taught by Hattie Guillen OT at 2/14/2025  2:30 PM.  Learner: Patient  Readiness: Acceptance  Method: Explanation  Response: Verbalizes Understanding  Comment: coping strategies, OOB benefits, POC    Education Comments  No comments found.        Goals:   Encounter Problems       Encounter Problems  (Active)       ADLs       Pt will demo good safety awareness with all ADLs and demo good visual scanning of environment (Progressing)       Start:  02/14/25    Expected End:  03/07/25            Pt will demonstrate simulated IADLs with dynamic reaching, bending, and lifting to facilitate return to independence (Progressing)       Start:  02/14/25    Expected End:  03/07/25               BALANCE       Pt will maintain dynamic standing balance during ADL task with independent level of assistance in order to demonstrate decreased risk of falling and improved postural control. (Progressing)       Start:  02/14/25    Expected End:  03/07/25               COGNITION/SAFETY       Pt will independently identify and practice x3 coping stategies following review of handouts/practicing with therapist (Progressing)       Start:  02/14/25    Expected End:  03/07/25 02/14/25 at 2:30 PM   Hattie Guillen OT   Rehab Office: [phone]

## 2025-02-14 NOTE — HOSPITAL COURSE
"34 year-old female with past medical history of prior lacunar infarct (7/2018), hypertension, hyperlipidemia, migraines, DUSTIN, and depression who presented to OSH ED on 2/11/25 with 10-day history of headache. 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 Patient transferred to [facility] for further neurological management after developing worsening headache and vision loss after initiating heparin infusion.     Per chart review, patient presented to OSH ED with 3-week history of flu-like symptoms and 1.5 weeks of right eye blurry vision with constant frontal head pressure. Patient found to have superior sagittal to right sigmoid sinus CVST. On 2/12/25, patient with continued headache, worsened lethargy with agitation, and worsened vision loss concerning for Cushing's reflex. Decision made to transfer to [facility] for further monitoring, ophthalmology evaluation, and possible neurosurgical intervention for CVST removal.  Repeat CTH on arrival to [facility] 2/12 demonstrated no bleeding.    Discussed with patient about our recommendations to start lovenox injections for CVST; however, patient was adamant about pill form of medication d/t needle-phobia. Decision to discharge on Apixaban; however patient does not have insurance. Working with social work to help connect her with Medicaid; obtained 1 month supply of Apixaban from [facility].    On day of discharge 2/15, patient reported her headaches were getting worse and initially refused to go home. Later she made remarks such as \"just end this misery\" and gestured pointing a gun to her head. Psychiatry was consulted for suicidal ideation evaluation. While updating the patient and her family she became verbally and physically aggressive. Code violet was called. Psychiatry deemed she fit the criteria for inpatient psychiatry admission.       To do  [ ] Has thrombocytosis low zinc 40 might be connected to her thrombocytosis   [ ] follow up labs: " FVL, JAK2, prothrombin  [ ] repeat MRV ordered

## 2025-02-15 LAB
ALBUMIN SERPL BCP-MCNC: 3.4 G/DL (ref 3.4–5)
ANION GAP SERPL CALC-SCNC: 13 MMOL/L (ref 10–20)
APTT PPP: 33 SECONDS (ref 27–38)
BACTERIA BLD CULT: NORMAL
BACTERIA BLD CULT: NORMAL
BASOPHILS # BLD AUTO: 0.07 X10*3/UL (ref 0–0.1)
BASOPHILS NFR BLD AUTO: 0.5 %
BLOOD EXPIRATION DATE: NORMAL
BUN SERPL-MCNC: 7 MG/DL (ref 6–23)
CALCIUM SERPL-MCNC: 9.3 MG/DL (ref 8.6–10.6)
CHLORIDE SERPL-SCNC: 108 MMOL/L (ref 98–107)
CO2 SERPL-SCNC: 22 MMOL/L (ref 21–32)
CREAT SERPL-MCNC: 0.66 MG/DL (ref 0.5–1.05)
DISPENSE STATUS: NORMAL
EGFRCR SERPLBLD CKD-EPI 2021: >90 ML/MIN/1.73M*2
EOSINOPHIL # BLD AUTO: 0.05 X10*3/UL (ref 0–0.7)
EOSINOPHIL NFR BLD AUTO: 0.4 %
ERYTHROCYTE [DISTWIDTH] IN BLOOD BY AUTOMATED COUNT: 27.1 % (ref 11.5–14.5)
ETHANOL SERPL-MCNC: <10 MG/DL
GLUCOSE SERPL-MCNC: 75 MG/DL (ref 74–99)
HCT VFR BLD AUTO: 32.2 % (ref 36–46)
HGB BLD-MCNC: 8.6 G/DL (ref 12–16)
HGB RETIC QN: 27 PG (ref 28–38)
IMM GRANULOCYTES # BLD AUTO: 0.19 X10*3/UL (ref 0–0.7)
IMM GRANULOCYTES NFR BLD AUTO: 1.4 % (ref 0–0.9)
IMMATURE RETIC FRACTION: 43.8 %
INR PPP: 1.1 (ref 0.9–1.1)
LYMPHOCYTES # BLD AUTO: 3.09 X10*3/UL (ref 1.2–4.8)
LYMPHOCYTES NFR BLD AUTO: 22.9 %
MAGNESIUM SERPL-MCNC: 2.28 MG/DL (ref 1.6–2.4)
MCH RBC QN AUTO: 18.6 PG (ref 26–34)
MCHC RBC AUTO-ENTMCNC: 26.7 G/DL (ref 32–36)
MCV RBC AUTO: 70 FL (ref 80–100)
MONOCYTES # BLD AUTO: 0.89 X10*3/UL (ref 0.1–1)
MONOCYTES NFR BLD AUTO: 6.6 %
NEUTROPHILS # BLD AUTO: 9.19 X10*3/UL (ref 1.2–7.7)
NEUTROPHILS NFR BLD AUTO: 68.2 %
NRBC BLD-RTO: 0.3 /100 WBCS (ref 0–0)
OVALOCYTES BLD QL SMEAR: NORMAL
PHOSPHATE SERPL-MCNC: 3.8 MG/DL (ref 2.5–4.9)
PLATELET # BLD AUTO: 538 X10*3/UL (ref 150–450)
POLYCHROMASIA BLD QL SMEAR: NORMAL
POTASSIUM SERPL-SCNC: 3.3 MMOL/L (ref 3.5–5.3)
PRODUCT BLOOD TYPE: 5100
PRODUCT CODE: NORMAL
PROTHROMBIN TIME: 12.5 SECONDS (ref 9.8–12.8)
RBC # BLD AUTO: 4.63 X10*6/UL (ref 4–5.2)
RBC MORPH BLD: NORMAL
RETICS #: 0.09 X10*6/UL (ref 0.02–0.08)
RETICS/RBC NFR AUTO: 2 % (ref 0.5–2)
SODIUM SERPL-SCNC: 140 MMOL/L (ref 136–145)
UNIT ABO: NORMAL
UNIT NUMBER: NORMAL
UNIT RH: NORMAL
UNIT VOLUME: 350
VIT B1 PYROPHOSHATE BLD-SCNC: 156 NMOL/L (ref 70–180)
WBC # BLD AUTO: 13.5 X10*3/UL (ref 4.4–11.3)
XM INTEP: NORMAL

## 2025-02-15 PROCEDURE — 80069 RENAL FUNCTION PANEL: CPT

## 2025-02-15 PROCEDURE — 2500000001 HC RX 250 WO HCPCS SELF ADMINISTERED DRUGS (ALT 637 FOR MEDICARE OP): Performed by: STUDENT IN AN ORGANIZED HEALTH CARE EDUCATION/TRAINING PROGRAM

## 2025-02-15 PROCEDURE — 85045 AUTOMATED RETICULOCYTE COUNT: CPT | Performed by: STUDENT IN AN ORGANIZED HEALTH CARE EDUCATION/TRAINING PROGRAM

## 2025-02-15 PROCEDURE — 2500000001 HC RX 250 WO HCPCS SELF ADMINISTERED DRUGS (ALT 637 FOR MEDICARE OP)

## 2025-02-15 PROCEDURE — 36415 COLL VENOUS BLD VENIPUNCTURE: CPT | Performed by: STUDENT IN AN ORGANIZED HEALTH CARE EDUCATION/TRAINING PROGRAM

## 2025-02-15 PROCEDURE — 2500000004 HC RX 250 GENERAL PHARMACY W/ HCPCS (ALT 636 FOR OP/ED): Mod: JW | Performed by: STUDENT IN AN ORGANIZED HEALTH CARE EDUCATION/TRAINING PROGRAM

## 2025-02-15 PROCEDURE — 84425 ASSAY OF VITAMIN B-1: CPT | Performed by: STUDENT IN AN ORGANIZED HEALTH CARE EDUCATION/TRAINING PROGRAM

## 2025-02-15 PROCEDURE — 85610 PROTHROMBIN TIME: CPT | Performed by: STUDENT IN AN ORGANIZED HEALTH CARE EDUCATION/TRAINING PROGRAM

## 2025-02-15 PROCEDURE — 82077 ASSAY SPEC XCP UR&BREATH IA: CPT

## 2025-02-15 PROCEDURE — 2500000002 HC RX 250 W HCPCS SELF ADMINISTERED DRUGS (ALT 637 FOR MEDICARE OP, ALT 636 FOR OP/ED): Performed by: STUDENT IN AN ORGANIZED HEALTH CARE EDUCATION/TRAINING PROGRAM

## 2025-02-15 PROCEDURE — 85025 COMPLETE CBC W/AUTO DIFF WBC: CPT | Performed by: STUDENT IN AN ORGANIZED HEALTH CARE EDUCATION/TRAINING PROGRAM

## 2025-02-15 PROCEDURE — 83735 ASSAY OF MAGNESIUM: CPT

## 2025-02-15 PROCEDURE — 99222 1ST HOSP IP/OBS MODERATE 55: CPT

## 2025-02-15 PROCEDURE — 1200000002 HC GENERAL ROOM WITH TELEMETRY DAILY

## 2025-02-15 RX ORDER — DIVALPROEX SODIUM 250 MG/1
250 TABLET, DELAYED RELEASE ORAL EVERY 12 HOURS SCHEDULED
Status: DISCONTINUED | OUTPATIENT
Start: 2025-02-15 | End: 2025-02-17 | Stop reason: HOSPADM

## 2025-02-15 RX ORDER — FLUTICASONE PROPIONATE 50 MCG
2 SPRAY, SUSPENSION (ML) NASAL DAILY
Qty: 16 G | Refills: 12 | Status: SHIPPED | OUTPATIENT
Start: 2025-02-16

## 2025-02-15 RX ORDER — POTASSIUM CHLORIDE 20 MEQ/1
20 TABLET, EXTENDED RELEASE ORAL ONCE
Status: DISCONTINUED | OUTPATIENT
Start: 2025-02-15 | End: 2025-02-17 | Stop reason: HOSPADM

## 2025-02-15 RX ORDER — ACETAMINOPHEN 325 MG/1
650 TABLET ORAL EVERY 6 HOURS PRN
Qty: 30 TABLET | Refills: 0 | Status: SHIPPED | OUTPATIENT
Start: 2025-02-15

## 2025-02-15 RX ORDER — OLANZAPINE 10 MG/2ML
2.5 INJECTION, POWDER, FOR SOLUTION INTRAMUSCULAR EVERY 6 HOURS PRN
Status: DISCONTINUED | OUTPATIENT
Start: 2025-02-15 | End: 2025-02-17 | Stop reason: HOSPADM

## 2025-02-15 RX ORDER — FOLIC ACID 1 MG/1
1 TABLET ORAL DAILY
Qty: 30 TABLET | Refills: 1 | Status: SHIPPED | OUTPATIENT
Start: 2025-02-16 | End: 2025-04-17

## 2025-02-15 RX ORDER — OLANZAPINE 5 MG/1
2.5 TABLET, ORALLY DISINTEGRATING ORAL EVERY 6 HOURS PRN
Status: DISCONTINUED | OUTPATIENT
Start: 2025-02-15 | End: 2025-02-17 | Stop reason: HOSPADM

## 2025-02-15 RX ORDER — ACETAZOLAMIDE 250 MG/1
500 TABLET ORAL 2 TIMES DAILY
Qty: 120 TABLET | Refills: 2 | Status: SHIPPED | OUTPATIENT
Start: 2025-02-15 | End: 2025-05-16

## 2025-02-15 RX ORDER — LANOLIN ALCOHOL/MO/W.PET/CERES
100 CREAM (GRAM) TOPICAL DAILY
Qty: 30 TABLET | Refills: 1 | Status: SHIPPED | OUTPATIENT
Start: 2025-02-16 | End: 2025-04-17

## 2025-02-15 RX ORDER — DIVALPROEX SODIUM 250 MG/1
250 TABLET, DELAYED RELEASE ORAL 2 TIMES DAILY
Qty: 60 TABLET | Refills: 1 | Status: SHIPPED | OUTPATIENT
Start: 2025-02-15 | End: 2025-04-16

## 2025-02-15 RX ORDER — ACETAMINOPHEN 325 MG/1
650 TABLET ORAL EVERY 6 HOURS
Status: DISCONTINUED | OUTPATIENT
Start: 2025-02-15 | End: 2025-02-17 | Stop reason: HOSPADM

## 2025-02-15 RX ADMIN — ACETAZOLAMIDE 500 MG: 250 TABLET ORAL at 22:03

## 2025-02-15 RX ADMIN — ACETAMINOPHEN 650 MG: 325 TABLET ORAL at 20:52

## 2025-02-15 RX ADMIN — HYDROMORPHONE HYDROCHLORIDE 0.2 MG: 1 INJECTION, SOLUTION INTRAMUSCULAR; INTRAVENOUS; SUBCUTANEOUS at 04:36

## 2025-02-15 RX ADMIN — ACETAZOLAMIDE 500 MG: 250 TABLET ORAL at 10:51

## 2025-02-15 RX ADMIN — Medication 5 MG: at 20:52

## 2025-02-15 RX ADMIN — HYDROMORPHONE HYDROCHLORIDE 0.2 MG: 1 INJECTION, SOLUTION INTRAMUSCULAR; INTRAVENOUS; SUBCUTANEOUS at 10:51

## 2025-02-15 RX ADMIN — FLUTICASONE PROPIONATE 2 SPRAY: 50 SPRAY, METERED NASAL at 10:52

## 2025-02-15 RX ADMIN — APIXABAN 5 MG: 5 TABLET, FILM COATED ORAL at 08:27

## 2025-02-15 RX ADMIN — FOLIC ACID 1 MG: 1 TABLET ORAL at 08:26

## 2025-02-15 RX ADMIN — OXYCODONE 10 MG: 5 TABLET ORAL at 06:03

## 2025-02-15 RX ADMIN — OXYCODONE 10 MG: 5 TABLET ORAL at 12:09

## 2025-02-15 RX ADMIN — HYDROMORPHONE HYDROCHLORIDE 0.2 MG: 1 INJECTION, SOLUTION INTRAMUSCULAR; INTRAVENOUS; SUBCUTANEOUS at 13:43

## 2025-02-15 RX ADMIN — APIXABAN 5 MG: 5 TABLET, FILM COATED ORAL at 20:52

## 2025-02-15 RX ADMIN — THIAMINE HCL TAB 100 MG 100 MG: 100 TAB at 08:27

## 2025-02-15 RX ADMIN — HYDROMORPHONE HYDROCHLORIDE 0.2 MG: 1 INJECTION, SOLUTION INTRAMUSCULAR; INTRAVENOUS; SUBCUTANEOUS at 00:16

## 2025-02-15 RX ADMIN — HYDROMORPHONE HYDROCHLORIDE 0.2 MG: 1 INJECTION, SOLUTION INTRAMUSCULAR; INTRAVENOUS; SUBCUTANEOUS at 07:46

## 2025-02-15 RX ADMIN — DIVALPROEX SODIUM 250 MG: 250 TABLET, DELAYED RELEASE ORAL at 20:52

## 2025-02-15 ASSESSMENT — COGNITIVE AND FUNCTIONAL STATUS - GENERAL
DAILY ACTIVITIY SCORE: 24
CLIMB 3 TO 5 STEPS WITH RAILING: A LITTLE
WALKING IN HOSPITAL ROOM: A LITTLE
MOBILITY SCORE: 22
DAILY ACTIVITIY SCORE: 24
CLIMB 3 TO 5 STEPS WITH RAILING: A LITTLE
MOBILITY SCORE: 22
WALKING IN HOSPITAL ROOM: A LITTLE

## 2025-02-15 ASSESSMENT — PAIN DESCRIPTION - LOCATION
LOCATION: FACE
LOCATION: FACE
LOCATION: HEAD
LOCATION: FACE

## 2025-02-15 ASSESSMENT — PAIN DESCRIPTION - ORIENTATION
ORIENTATION: RIGHT

## 2025-02-15 ASSESSMENT — PAIN SCALES - GENERAL
PAINLEVEL_OUTOF10: 7
PAINLEVEL_OUTOF10: 8
PAINLEVEL_OUTOF10: 7
PAINLEVEL_OUTOF10: 9
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
## 2025-02-15 NOTE — PROGRESS NOTES
OPHTHALMOLOGY PROGRESS NOTE      Shreya Garces is a 34 y.o. female on day 3 of admission presenting with Acute idiopathic CVST ([facility]).    Subjective   Patient reports stable vision OU and persistent/stable pressure behind right eye. Denies headache, diplopia, tinnitus.      Objective     Last Recorded Vitals  Blood pressure (!) 142/95, pulse (!) 44, temperature 36.6 °C (97.9 °F), resp. rate 16, weight 61.7 kg (135 lb 14.6 oz), SpO2 98%.    Physical Exam  Base Eye Exam       Visual Acuity (Chad Cards)         Right Left    Near sc 20/20 20/20-1              Tonometry (Tonopen, 7:33 AM)         Right Left    Pressure 9 11              Pupils         Dark Light Shape React APD    Right 4 3 Round Brisk None    Left 4 3 Round Brisk None              Visual Fields (Counting fingers)         Left Right     Full Full              Extraocular Movement         Right Left     Full, Ortho Full, Ortho              Neuro/Psych       Oriented x3: Yes    Mood/Affect: Normal                  Additional Tests       Color         Right Left    [place] 11/11 11/11                  Imaging  CT HEAD WO CONTRAST 2/10/2025  IMPRESSION:  Hyperdense thrombus on unenhanced imaging with corresponding filling  defects on CT venogram noted within the posterior superior sagittal  sinus, the straight sinus, the right transverse sinus and the right  sigmoid sinus consistent with extensive posterior dural venous  thrombosis. Diminutive appearance of the left transverse sinus which  may be developmental.      No evidence of hemorrhagic infarct    CT VENOGRAM HEAD W/WO CONTRAST 2/11/2025  IMPRESSION:  Hyperdense thrombus on unenhanced imaging with corresponding filling  defects on CT venogram noted within the posterior superior sagittal  sinus, the straight sinus, the right transverse sinus and the right  sigmoid sinus consistent with extensive posterior dural venous  thrombosis. Diminutive appearance of the left transverse sinus which  may be  developmental.      No evidence of hemorrhagic infarct.    MRI BRAIN W/WO CONTRAST 2/11/2025  IMPRESSION:  1.  No acute intracranial abnormality. Unremarkable brain.    MRV INTRACRANIAL W/WO CONTRAST 2/11/2025  IMPRESSION:  Again demonstrated is extensive thrombosis posterior half of the  superior sagittal sinus, straight sinus, vein of Conor, right sigmoid  and transverse sinus into the right internal jugular vein, and  proximal left transverse sinus.    CT HEAD WO CONTRAST 2/12/2025  IMPRESSION:  1. Similar findings consistent with extensive dural venous thrombosis.  2. No evidence of large ischemic infarct or intraparenchymal  hemorrhage.  3. Similar paranasal sinus disease. The presence of fluid in the left  maxillary sinus could reflect acute sinusitis in the appropriate  clinical setting.    Workup  PT 12.7, INR 1.1, PTT 29  Haptoglobin 340 (H)  Folate 4.8 (L)  Protein C/S  79/94  Antithrombin III antigen 100  Homocysteine 5.72  Factor VIII activity 226 (H)  Lupus anticoagulant with interpretation normal  Factor visual acuity (V) Leiden - pending  JAKE2 V617F PCR - pending  Prothrombin gene mutation - pending  ESR 52 (H)  CRP 0.89  VERONICA negative  HAYDEN panel negative  Reticulocytes hemoglobin 19 (L), immature fraction 35.6 (H)  HIV 1/2 normal  ANCA/vasculitis profile normal  Zinc 40 (L)  D-dimer 883 (H)  Cardiolipin normal  Anti-thyroglobulin AB <0.9  VERONICA negative    Assessment/Plan   Assessment & Plan  Acute idiopathic CVST ([facility])    Middle ear effusion    This 34 year old woman with history of lacunar stroke (7/2018), HTN, HLD, migraine, HELLP syndrome, NAFLD, prediabetes, and gastric bypass surgery is admitted 2/11/2025 for CVST. Initial exam revealed grade 2 optic disc edema OD and patient refused exam OS. Subsequent dilated exam with grade 2 disc edema OU. Currently undergoing treatment with heparin and hypercoagulability workup. Also receiving acetazolamide as temporizing treatment to protect against  optic neuropathy during period of elevated ICP.     Although the most likely unifying diagnosis is induced CVST iso of recent flu and possible underlying hypercoagulability, the combination of encephalopathy, prior stroke and hearing loss raise the possibility of Susac's Syndrome. Susac's Syndrome-associated CVST has been described in the literature (https://onlinelibrary.patterson.com/doi/10.1155/2020/7588872). There are no retina findings currently to support this diagnosis and previous MRI does not show associated findings, but outpatient retina evaluation is worthwhile.      Update 2/15/25: No subjective vision changes. Entrance testing excellent and stable.     Plan:  - Recommend continuing acetazolamide 500 BID  - Recommend completing course of thiamine and following up thiamine level  - Ophthalmology will continue to follow while inpatient  - Please page ophthalmology prior to discharge to arrange outpatient follow-up     Matthieu Gabriel MD  Ophthalmology PGY-2    Ophthalmology Adult Pager - [phone]  Ophthalmology Pediatrics Pager - [phone]     For adult follow-up appointments, call: [phone]  For pediatric follow-up appointments, call: [phone]    Note finalized upon attending signature.

## 2025-02-15 NOTE — SIGNIFICANT EVENT
Discussed with the attending Dr. Wilson. The patient is medically cleared at this time.    Will contact [facility] for referral.    Yadira Sanchez MD PhD  Neurology resident, PGY-2

## 2025-02-15 NOTE — PROGRESS NOTES
Email to [facility] that patient has no insurance listed in EPIC.     Katie Kulkarni (LSW, MSW)
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
## 2025-02-15 NOTE — PROGRESS NOTES
Subjective    NAOE. Doing ok this AM - no new symptoms. Keen to go home today.     Objective   Heart Rate:  [44-66]   Temp:  [36.5 °C (97.7 °F)-36.9 °C (98.4 °F)]   Resp:  [9-18]   BP: (125-148)/()   Weight:  [61.7 kg (135 lb 14.6 oz)]   SpO2:  [96 %-99 %]     PHYSICAL EXAM:  GENERAL APPEARANCE:  No distress, alert, interactive and cooperative    MENTAL STATE:   Orientation was normal to time, place and person. Recent and remote memory was intact.  Attention span and concentration were normal. Language testing was normal for comprehension, repetition, expression, and naming.     CRANIAL NERVES:   CN 2   Visual fields full to confrontation.   CN 3, 4, 6   Pupils round, 4 mm in diameter, equally reactive to light. Lids symmetric; no ptosis. EOMs normal alignment, full range with normal pursuit and convergence.    CN 5   Facial sensation intact bilaterally.   CN 7   Normal and symmetric facial strength. Nasolabial folds symmetric.   CN 8   Hearing intact to conversation  CN 11   Normal strength of shoulder shrug    CN 12   Tongue midline, with normal bulk and strength; no fasciculations.     MOTOR:   Muscle bulk and tone were normal in both upper and lower extremities.   No fasciculations, tremor or other abnormal movements were present.   Moving all extremities spontaneously antigravity     SENSORY:   In both upper and lower extremities, sensation was intact to light touch           MEDICATIONS:  Scheduled: PRN: Continuous:   acetaZOLAMIDE, 500 mg, oral, BID  apixaban, 5 mg, oral, BID  fluticasone, 2 spray, Each Nostril, Daily  folic acid, 1 mg, oral, Daily  melatonin, 5 mg, oral, Nightly  pantoprazole, 40 mg, intravenous, Daily  polyethylene glycol, 17 g, oral, Daily  sennosides-docusate sodium, 2 tablet, oral, BID  thiamine, 100 mg, oral, Daily  tropicamide, 1 drop, Both Eyes, Once     PRN medications: acetaminophen, diphenhydrAMINE, HYDROmorphone, ondansetron **OR** ondansetron, oxyCODONE, oxyCODONE  lactated Ringer's, 75 mL/hr, Last Rate: 75 mL/hr (02/14/25 1300)         LAB RESULTS:  [unfilled]  Results from last 72 hours   Lab Units 02/14/25  0446 02/13/25  0016   WBC AUTO x10*3/uL 13.4* 14.6*   NRBC AUTO /100 WBCs 0.9* 0.3*   RBC AUTO x10*6/uL 4.54 4.01   HEMOGLOBIN g/dL 8.2* 7.3*   HEMATOCRIT % 30.0* 26.8*   MCV fL 66* 67*   MCH pg 18.1* 18.2*   MCHC g/dL 27.3* 27.2*   RDW % 26.0* 24.2*   PLATELETS AUTO x10*3/uL 525* 530*   NEUTROS PCT AUTO % 70.8 78.8   IG PCT AUTO % 1.9* 1.3*   LYMPHS PCT AUTO % 19.1 12.9   MONOS PCT AUTO % 7.5 6.6   EOS PCT AUTO % 0.2 0.1   BASOS PCT AUTO % 0.5 0.3   NEUTROS ABS x10*3/uL 9.46* 11.54*   IG AUTO x10*3/uL 0.26 0.19   LYMPHS ABS AUTO x10*3/uL 2.56 1.89   MONOS ABS AUTO x10*3/uL 1.00 0.96   EOS ABS AUTO x10*3/uL 0.03 0.01   BASOS ABS AUTO x10*3/uL 0.07 0.04        IMAGING RESULTS:  CT head wo IV contrast   Final Result   1. Similar findings consistent with extensive dural venous thrombosis.   2. No evidence of large ischemic infarct or intraparenchymal   hemorrhage.   3. Similar paranasal sinus disease. The presence of fluid in the left   maxillary sinus could reflect acute sinusitis in the appropriate   clinical setting.                  I personally reviewed the images/study and I agree with the findings   as stated by Gordon Sweeney DO, PGY-3.        MACRO:   None        Signed by: Cynthia Maki 2/13/2025 5:50 AM   Dictation workstation:   [id number]             Assessment/Plan    34 year-old female with past medical history of prior lacunar infarct (7/2018), hypertension, hyperlipidemia, migraines, DUSTIN, and depression who presented to [facility] ED on 2/11/25 with 10-day history of headache. 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 Patient transferred to [facility] for further neurological management after developing worsening headache and vision loss after initiating heparin infusion.     Per chart review, patient presented to [facility] ED  with 3-week history of flu-like symptoms and 1.5 weeks of right eye blurry vision with constant frontal head pressure. Patient found to have superior sagittal to right sigmoid sinus CVST. On 2/12/25, patient with continued headache, worsened lethargy with agitation, and worsened vision loss concerning for Cushing's reflex. She was transferred to [facility] for further management. She was transitioned from heparin gtt to eliquis. Currently managing ongoing headaches with PRN migraine cocktail.     Updates 02/15/25:  - ENT recs: middle reactive ear effusion - rec'd flonase sprays. Outpt follow up   - Possible discharge home today - will follow up with      #CVST within posterior superior sagittal, straight, right transverse and right sigmoid sinus  #Prior lacunar infarcts  #Concern for meningitis  #Migraines  Assessment:  - Neurologically: see above  - See above history and significant events  - Bradycardic (since admission), hypertensive, bradypneic, decreased mentation vs. agitation  - CTH on arrival 2/12: no bleeding  Plan:  - Floor  - Low intensity heparin infusion with PRN bolus  - Continue Diamox 500mg BID for increased ICP  - No LP at this time, will continue empiric Abx for meningoencephalitis and follow BCx  - Folic acid 1mg, melatonin 5mg nightly  - Neuro Checks: Q4H  - Sedation: None  - Pain: acetaminophen PRN and hydromorphone PRN  - Nausea: ondansetron  - PT/OT/SLP    #HTN  #HLD  #Bradycardia, asymptomatic  - ECHO 2/11 - LVEF 64% (Willams's biplane), normal right global systolic function, RVSP normal  - Continue to monitor on telemetry  - BP goal: SBP >100, SBP <180  - Concern for cushing reflex in setting of CVST    #Microcytic hypoproliferative anemia  Assessment:  - Baseline Hgb: 9.8  - Baseline Plts: 307  - TIBC 2.7%  - Reticulocyte Production Index (RPI) 1.0 (inadequate repsonse)  - Corrected Reticulocyte Percentage 1.47%  Results from last 7 days   Lab Units 02/14/25  0446 02/13/25  0016 02/12/25  1940    HEMOGLOBIN g/dL 8.2* 7.3* 7.6*   HEMATOCRIT % 30.0* 26.8* 26.9*   PLATELETS AUTO x10*3/uL 525* 530* 540*     Plan:  - Continue to monitor with daily CBC and Coag panel  - Resume Venofer 300mg IV for 1 additional dose (3 total on 2/11, 2/12, 2/13)  - Hemoglobin electrophoresis/identification sent per recommendation of Cardiology at OSH  - Peripheral smear sent  - Hypercoagulable workup pending  - Sent anti beta-2 glycoprotein    Pain medications: PRN Dilaudid Tylenol Oxycodone  Fluids: Replete PRN  Electrolytes: Keep mg >2, phos >3  and K >4  Nutrition:  Adult diet Regular   Antimicrobials: Ceftriaxone  Anticoagulation: High dose heparin  DVT PPX: Heparin Drip  GI ppx: Pantoprazole 40mg daily  Bowel care: Miralax  Catheter: None  Lines: PIV  Oxygen: Room Air    Disposition: home    Code Status: Full Code (confirmed on admission)   NOK:  Primary Emergency Contact: Melisa Garces, Home Phone: [phone]     Jose Daniel Bonilla MD  PGY-2 Neurology

## 2025-02-16 LAB — GLUCOSE BLD MANUAL STRIP-MCNC: 72 MG/DL (ref 74–99)

## 2025-02-16 PROCEDURE — 82947 ASSAY GLUCOSE BLOOD QUANT: CPT

## 2025-02-16 PROCEDURE — 2500000001 HC RX 250 WO HCPCS SELF ADMINISTERED DRUGS (ALT 637 FOR MEDICARE OP): Performed by: STUDENT IN AN ORGANIZED HEALTH CARE EDUCATION/TRAINING PROGRAM

## 2025-02-16 PROCEDURE — 2500000004 HC RX 250 GENERAL PHARMACY W/ HCPCS (ALT 636 FOR OP/ED): Performed by: STUDENT IN AN ORGANIZED HEALTH CARE EDUCATION/TRAINING PROGRAM

## 2025-02-16 PROCEDURE — 1100000001 HC PRIVATE ROOM DAILY

## 2025-02-16 PROCEDURE — 2500000001 HC RX 250 WO HCPCS SELF ADMINISTERED DRUGS (ALT 637 FOR MEDICARE OP)

## 2025-02-16 RX ADMIN — Medication 5 MG: at 22:24

## 2025-02-16 RX ADMIN — DIVALPROEX SODIUM 250 MG: 250 TABLET, DELAYED RELEASE ORAL at 22:24

## 2025-02-16 RX ADMIN — ACETAMINOPHEN 650 MG: 325 TABLET ORAL at 14:00

## 2025-02-16 RX ADMIN — ACETAZOLAMIDE 500 MG: 250 TABLET ORAL at 22:24

## 2025-02-16 RX ADMIN — DIVALPROEX SODIUM 250 MG: 250 TABLET, DELAYED RELEASE ORAL at 09:16

## 2025-02-16 RX ADMIN — FOLIC ACID 1 MG: 1 TABLET ORAL at 09:15

## 2025-02-16 RX ADMIN — APIXABAN 5 MG: 5 TABLET, FILM COATED ORAL at 09:15

## 2025-02-16 RX ADMIN — APIXABAN 5 MG: 5 TABLET, FILM COATED ORAL at 22:24

## 2025-02-16 RX ADMIN — ACETAMINOPHEN 650 MG: 325 TABLET ORAL at 09:15

## 2025-02-16 RX ADMIN — PANTOPRAZOLE SODIUM 40 MG: 40 INJECTION, POWDER, FOR SOLUTION INTRAVENOUS at 09:16

## 2025-02-16 RX ADMIN — ACETAZOLAMIDE 500 MG: 250 TABLET ORAL at 09:15

## 2025-02-16 RX ADMIN — THIAMINE HCL TAB 100 MG 100 MG: 100 TAB at 09:15

## 2025-02-16 RX ADMIN — ACETAMINOPHEN 650 MG: 325 TABLET ORAL at 22:24

## 2025-02-16 ASSESSMENT — PAIN - FUNCTIONAL ASSESSMENT: PAIN_FUNCTIONAL_ASSESSMENT: 0-10

## 2025-02-16 ASSESSMENT — PAIN SCALES - GENERAL
PAINLEVEL_OUTOF10: 0 - NO PAIN
PAINLEVEL_OUTOF10: 0 - NO PAIN

## 2025-02-16 NOTE — SIGNIFICANT EVENT
Patient refused eye exam this morning and is demanding to leave. She says that she will not participate in further testing since she can't afford more time in the hospital and more charges. Explained to her the importance of her eye exams to ensure no new symptoms or worsening pathology. Discussed the risks of deferred eye exams and that we would not be able to assess if there are new pathology in the eye. Patient voiced understanding and continued to adamantly refuse exam. Updated primary team as well.

## 2025-02-16 NOTE — NURSING NOTE
1945 Patient arrived from LT 7 with sitter at bedside. Assumed patient care, acquainted patient to room environment. Bed locked in lowest position with call light within reach.     2307 Spoke with Dr. Johnson to have orders placed for a safety tray with meals due to recent suicidal ideation. Received Verbal orders with readback for safety tray,. No further orders at this time.

## 2025-02-16 NOTE — CARE PLAN
Problem: Fall/Injury  Goal: Not fall by end of shift  Outcome: Progressing  Goal: Be free from injury by end of the shift  Outcome: Progressing  Goal: Verbalize understanding of personal risk factors for fall in the hospital  Outcome: Progressing  Goal: Verbalize understanding of risk factor reduction measures to prevent injury from fall in the home  Outcome: Progressing  Goal: Use assistive devices by end of the shift  Outcome: Progressing   The patient's goals for the shift include      The clinical goals for the shift include Pt safety will be maintained in duration of the shift    Over the shift, the patient did make progress toward the following goals.     
  Problem: Pain  Goal: Takes deep breaths with improved pain control throughout the shift  Outcome: Progressing  Goal: Turns in bed with improved pain control throughout the shift  Outcome: Progressing  Goal: Performs ADL's with improved pain control throughout shift  Outcome: Progressing  Goal: Free from opioid side effects throughout the shift  Outcome: Progressing  Goal: Free from acute confusion related to pain meds throughout the shift  Outcome: Progressing     Problem: Fall/Injury  Goal: Not fall by end of shift  Outcome: Progressing  Goal: Be free from injury by end of the shift  Outcome: Progressing  Goal: Verbalize understanding of personal risk factors for fall in the hospital  Outcome: Progressing  Goal: Pace activities to prevent fatigue by end of the shift  Outcome: Progressing       
  Problem: Skin  Goal: Decreased wound size/increased tissue granulation at next dressing change  Outcome: Progressing  Goal: Participates in plan/prevention/treatment measures  Outcome: Progressing  Goal: Prevent/manage excess moisture  Outcome: Progressing  Goal: Prevent/minimize sheer/friction injuries  Outcome: Progressing  Goal: Promote/optimize nutrition  Outcome: Progressing  Goal: Promote skin healing  Outcome: Progressing     Problem: Pain  Goal: Takes deep breaths with improved pain control throughout the shift  Outcome: Progressing  Goal: Turns in bed with improved pain control throughout the shift  Outcome: Progressing  Goal: Walks with improved pain control throughout the shift  Outcome: Progressing  Goal: Performs ADL's with improved pain control throughout shift  Outcome: Progressing  Goal: Participates in PT with improved pain control throughout the shift  Outcome: Progressing  Goal: Free from opioid side effects throughout the shift  Outcome: Progressing  Goal: Free from acute confusion related to pain meds throughout the shift  Outcome: Progressing     Problem: Fall/Injury  Goal: Not fall by end of shift  Outcome: Progressing  Goal: Be free from injury by end of the shift  Outcome: Progressing  Goal: Verbalize understanding of personal risk factors for fall in the hospital  Outcome: Progressing  Goal: Verbalize understanding of risk factor reduction measures to prevent injury from fall in the home  Outcome: Progressing  Goal: Use assistive devices by end of the shift  Outcome: Progressing  Goal: Pace activities to prevent fatigue by end of the shift  Outcome: Progressing       
The patient's goals for the shift include  pain relief    The clinical goals for the shift include pt will remain hds, have no new neuro deficits and experience increased comfort with a  decreased pain level.    Problem: Pain  Goal: Takes deep breaths with improved pain control throughout the shift  Outcome: Progressing     Problem: Pain  Goal: Performs ADL's with improved pain control throughout shift  Outcome: Progressing     Problem: Pain  Goal: Free from opioid side effects throughout the shift  Outcome: Progressing     Problem: Skin  Goal: Prevent/minimize sheer/friction injuries  Outcome: Progressing     Problem: Skin  Goal: Promote/optimize nutrition  Outcome: Progressing     Problem: Skin  Goal: Prevent/manage excess moisture  Outcome: Progressing     Problem: Pain  Goal: Free from acute confusion related to pain meds throughout the shift  Outcome: Progressing     Problem: Fall/Injury  Goal: Not fall by end of shift  Outcome: Progressing     Problem: Fall/Injury  Goal: Be free from injury by end of the shift  Outcome: Progressing     
yes

## 2025-02-16 NOTE — DISCHARGE SUMMARY
Discharge Diagnosis  Acute idiopathic CVST ([facility])    Problem List  Assessment & Plan  Acute idiopathic CVST ([facility]CC)    Middle ear effusion      Shreya Garces is a 34 y.o. female who presented to the hospital with ***. They were diagnosed with a {Diagnosis:27339}.  Etiology: {Etiology:96431}    Relevant hospital complications: ***  Discharge antithrombotics: ***  Pending evaluation:  ***   Issues Requiring Follow-Up  ***  MR brain w and wo IV contrast    Result Date: 2/11/2025  Interpreted By:  Kodak Castrejon, STUDY: MR BRAIN W AND WO IV CONTRAST;  2/11/2025 7:11 pm   INDICATION: Signs/Symptoms:Cerebral venous thrombosis Please assess for cavernou sinus thrombosis or strokes.     COMPARISON: CTV earlier today.   ACCESSION NUMBER(S): [id number]   ORDERING CLINICIAN: APOLINAR ALEX   TECHNIQUE: T2, FLAIR, DWI, ADC, gradient echo T2, and T1 weighted images of brain were acquired without intravenous contrast administration. Multi planar T1 weighted imaging was acquired after administration of 13 ML Dotarem gadolinium based intravenous contrast.   FINDINGS: No abnormal brain parenchymal or meningeal enhancement. No intracranial mass lesions.  No destructive osseous lesions.   No acute intracranial hemorrhage. No extra-axial fluid collection. No abnormally restricted diffusion to suggest recent infarction.   Nonspecific white matter FLAIR signal increase most likely related to mild chronic small vessel ischemic change.   Moderate paranasal sinus/ethmoid mucosal inflammatory changes. Right-sided nonspecific mastoid fluid. Orbital contents unremarkable.       1.  No acute intracranial abnormality. Unremarkable brain.     MACRO: None   Signed by: Kodak Castrejon 2/11/2025 7:24 PM Dictation workstation:   [id number]   CT head wo IV contrast    Result Date: 2/13/2025  Interpreted By:  Cynthia Maki and Ogievich Taessa STUDY: CT HEAD WO IV CONTRAST;  2/12/2025 8:19 pm   INDICATION: Signs/Symptoms:Acute CVST,  worsening mentation, bradycardia, bradypnea, hypertension.     COMPARISON: Head CT 02/10/2025 CT venogram 02/11/2025 MR brain and venography 02/11/2025   ACCESSION NUMBER(S): [id number]   ORDERING CLINICIAN: KEHINDE MONTALVO   TECHNIQUE: Noncontrast axial CT images of head were obtained with coronal and sagittal reconstructed images.   FINDINGS: BRAIN PARENCHYMA: Gray-white differentiation is preserved. No mass-effect, midline shift or effacement of cerebral sulci.   HEMORRHAGE: Again seen similar hyperdense extensive thrombosis in the posterior half of the superior sagittal sinus, straight sinus, vein of Conor, right sigmoid and transverse sinus into the right jugular vein and proximal left transverse sinus (ex. Series 206, image 61 and 62). No acute intraparenchymal hemorrhage.   VENTRICLES and EXTRA-AXIAL SPACES: The ventricular system is nondilated.   PARANASAL SINUSES/MASTOIDS: Polypoid mucosal thickening of the left-greater-than-right maxillary sinus, ethmoid air cells, and left frontal sinus. There is opacification of the left frontal recess and left ostiomeatal unit complex. Right mastoid air cells are hypopneumatized and opacified. There is also at least partial opacification of the right middle ear cavity. Left-sided mastoid air cells are clear.   CALVARIUM: No depressed skull fracture.         1. Similar findings consistent with extensive dural venous thrombosis. 2. No evidence of large ischemic infarct or intraparenchymal hemorrhage. 3. Similar paranasal sinus disease. The presence of fluid in the left maxillary sinus could reflect acute sinusitis in the appropriate clinical setting.       I personally reviewed the images/study and I agree with the findings as stated by Gordon Sweeney DO, PGY-3.   MACRO: None   Signed by: Cynthia Maki 2/13/2025 5:50 AM Dictation workstation:   [id number]    CT head wo IV contrast    Result Date: 2/11/2025  Interpreted By:  Savannah Tipton, STUDY: CT HEAD WO IV  CONTRAST; CT VENOGRAM HEAD W AND WO IV CONTRAST AND POST PROCEDURE;  2/10/2025 11:28 pm; 2/11/2025 12:22 am   INDICATION: Signs/Symptoms:headache, subjective visual change; Signs/Symptoms:Concern for right transverse.   COMPARISON: None.   ACCESSION NUMBER(S): [id number]; [id number]   ORDERING CLINICIAN: BETTE JARA   TECHNIQUE: Axial noncontrast CT images of the head. Contrast-enhanced CT venogram.   FINDINGS: BRAIN PARENCHYMA: On the unenhanced imaging there is hyperdense attenuation within the posterosuperior sagittal sinus as well as the straight sinus, confluence and predominantly the right transverse sinus extending into the right sigmoid sinus. Findings are consistent with acute dural sinus thrombosis.   Gray-white matter interfaces are preserved. No mass effect or midline shift.   HEMORRHAGE: No acute intracranial hemorrhage. VENTRICLES and EXTRA-AXIAL SPACES: Normal size. EXTRACRANIAL SOFT TISSUES: Within normal limits. PARANASAL SINUSES/MASTOIDS: Mucosal thickening of the right maxillary sinus. Fluid throughout the left maxillary sinus, left ethmoid air cells on the left frontal sinus. CALVARIUM: No depressed skull fracture. No destructive osseous lesion.   Filling defect following contrast administration noted in the posterior superior sagittal sinus extending into the confluence. There is no significant contrast opacification within the straight sinus, the right transverse sinus or the right sigmoid sinus. The left transverse sinus is slightly diminutive which could be developmental versus related to partial filling defects. The internal cerebral veins and vein of Conor appears patent.   OTHER FINDINGS: None.       Hyperdense thrombus on unenhanced imaging with corresponding filling defects on CT venogram noted within the posterior superior sagittal sinus, the straight sinus, the right transverse sinus and the right sigmoid sinus consistent with extensive posterior dural venous thrombosis. Diminutive  appearance of the left transverse sinus which may be developmental.   No evidence of hemorrhagic infarct.   MACRO: Savannah Tipton discussed the significance and urgency of this critical finding by telephone with Babak Elliott on 2/11/2025 at 1:31 am.  (**-RCF-**) Findings:  See findings.   Signed by: Savannah Tipton 2/11/2025 1:37 AM Dictation workstation:   [id number]   Transthoracic Echo (TTE) Complete    Result Date: 2/12/2025            [facility] [place] [address] [facility] [address]    Tel [phone] Fax [phone] TRANSTHORACIC ECHOCARDIOGRAM REPORT Patient Name:       VERONICA CASTRO     Reading Physician:    Charles Ortega MD Study Date:         2/11/2025            Ordering Provider:    Charles ORTEGA MRN/PID:            [id number]             Fellow: Accession#:         [id number]         Nurse: Date of Birth/Age:  1990 / 34 years Sonographer:          Elaine Baum Gender Assigned at  F                    Additional Staff: Birth: Height:             162.56 cm            Admit Date: Weight:             63.96 kg             Admission Status:     Inpatient -                                                                Routine BSA / BMI:          1.69 m2 / 24.20      Department Location:  [facility]                     kg/m2 Blood Pressure: 156 /104 mmHg Study Type:    TRANSTHORACIC ECHO (TTE) COMPLETE Diagnosis/ICD: Bradycardia, unspecified-R00.1; Other cerebral infarction due to                occlusion or stenosis of small artery-I63.81 Indication:    Bradycardia, Lacunar stroke CPT Codes:     Echo Complete w Full Doppler-[id number] Patient History: Smoker:            Current. Pertinent History: HTN, Hyperlipidemia, TIA and CVA. Study Detail: The following Echo studies were performed: 2D, M-Mode, Doppler and               color flow. Agitated saline  used as a contrast agent for               intraseptal flow evaluation.  PHYSICIAN INTERPRETATION: Left Ventricle: Left ventricular ejection fraction is normal, calculated by Willams's biplane at 64%. There are no regional wall motion abnormalities. The left ventricular cavity size is upper limits of normal. There is normal septal and normal posterior left ventricular wall thickness. Spectral Doppler shows a normal pattern of left ventricular diastolic filling with normal left atrial filling pressure. LV Wall Scoring: All segments are normal. Left Atrium: The left atrial size is normal. Right Ventricle: The right ventricle is normal in size. There is normal right ventricular global systolic function. Right Atrium: The right atrial size is normal. Aortic Valve: The aortic valve is trileaflet. There is no aortic valve thickening. The aortic valve dimensionless index is 0.68. There is no evidence of aortic valve regurgitation. The peak instantaneous gradient of the aortic valve is 8 mmHg. The mean gradient of the aortic valve is 5 mmHg. Mitral Valve: The mitral valve is normal in structure. There is trace mitral valve regurgitation. Tricuspid Valve: The tricuspid valve is structurally normal. There is trace tricuspid regurgitation. The Doppler estimated RVSP is within normal limits at 24.0 mmHg. Pulmonic Valve: The pulmonic valve is structurally normal. There is no indication of pulmonic valve regurgitation. Pericardium: No pericardial effusion noted. Aorta: The aortic root is normal. There is no dilatation of the aortic arch. There is no dilatation of the ascending aorta. There is no dilatation of the aortic root. Pulmonary Artery: The main pulmonary artery is normal in size, and position, with normal bifurcation into the left and right pulmonary arteries.  CONCLUSIONS:  1. Left ventricular ejection fraction is normal, calculated by Willams's biplane at 64%.  2. There is normal right ventricular global systolic  function.  3. Right ventricular systolic pressure is within normal limits. QUANTITATIVE DATA SUMMARY:  2D MEASUREMENTS:             Normal Ranges: LAs:             3.50 cm     (2.7-4.0cm) IVSd:            0.90 cm     (0.6-1.1cm) LVPWd:           1.10 cm     (0.6-1.1cm) LVIDd:           4.70 cm     (3.9-5.9cm) LVIDs:           3.20 cm LV Mass Index:   97.5 g/m2 LVEDV Index:     56.63 ml/m2 LV % FS          31.9 %  LEFT ATRIUM:                  Normal Ranges: LA Vol A4C:        56.6 ml    (22+/-6mL/m2) LA Vol A2C:        49.8 ml LA Vol BP:         55.3 ml LA Vol Index A4C:  33.6ml/m2 LA Vol Index A2C:  29.5 ml/m2 LA Vol Index BP:   32.8 ml/m2 LA Area A4C:       19.1 cm2 LA Area A2C:       17.2 cm2 LA Major Axis A4C: 5.5 cm LA Major Axis A2C: 5.0 cm LA Volume Index:   29.5 ml/m2 LA Vol A4C:        48.8 ml LA Vol A2C:        47.5 ml LA Vol Index BSA:  28.6 ml/m2  RIGHT ATRIUM:                 Normal Ranges: RA Vol A4C:        24.3 ml    (8.3-19.5ml) RA Vol Index A4C:  14.4 ml/m2 RA Area A4C:       11.8 cm2 RA Major Axis A4C: 4.9 cm  M-MODE MEASUREMENTS:         Normal Ranges: Ao Root:             3.60 cm (2.0-3.7cm) AoV Exc:             1.90 cm (1.5-2.5cm)  AORTA MEASUREMENTS:         Normal Ranges: AoV Exc:            1.90 cm (1.5-2.5cm) Ao Sinus, d:        3.20 cm (2.1-3.5cm) Ao STJ, d:          2.60 cm (1.7-3.4cm) Asc Ao, d:          3.40 cm (2.1-3.4cm)  LV SYSTOLIC FUNCTION:                      Normal Ranges: EF-A4C View:    63 % (>=55%) EF-A2C View:    66 % EF-Biplane:     64 % LV EF Reported: 64 %  LV DIASTOLIC FUNCTION:            Normal Ranges: MV Peak E:             0.94 m/s   (0.7-1.2 m/s) MV Peak A:             0.37 m/s   (0.42-0.7 m/s) E/A Ratio:             2.53       (1.0-2.2) MV e'                  0.104 m/s  (>8.0) MV lateral e'          0.10 m/s MV medial e'           0.11 m/s E/e' Ratio:            9.02       (<8.0) PulmV Sys Raf:         65.10 cm/s PulmV Vera Raf:        36.60 cm/s PulmV S/D Raf:          1.80 PulmV A Revs Raf:      31.20 cm/s PulmV A Revs Dur:      82.00 msec  MITRAL VALVE:          Normal Ranges: MV DT:        207 msec (150-240msec)  MITRAL INSUFFICIENCY:             Normal Ranges: MR Vmax:              363.00 cm/s  AORTIC VALVE:                     Normal Ranges: AoV Vmax:                1.45 m/s (<=1.7m/s) AoV Peak P.4 mmHg (<20mmHg) AoV Mean P.0 mmHg (1.7-11.5mmHg) LVOT Max Raf:            1.05 m/s (<=1.1m/s) AoV VTI:                 34.50 cm (18-25cm) LVOT VTI:                23.50 cm LVOT Diameter:           2.20 cm  (1.8-2.4cm) AoV Area, VTI:           2.59 cm2 (2.5-5.5cm2) AoV Area,Vmax:           2.75 cm2 (2.5-4.5cm2) AoV Dimensionless Index: 0.68  RIGHT VENTRICLE: RV Basal 3.30 cm RV Mid   2.90 cm RV Major 6.8 cm TAPSE:   29.2 mm RV s'    0.12 m/s  TRICUSPID VALVE/RVSP:          Normal Ranges: Peak TR Velocity:     2.29 m/s Est. RA Pressure:     3 mmHg RV Syst Pressure:     24 mmHg  (< 30mmHg) IVC Diam:             2.40 cm  PULMONIC VALVE:          Normal Ranges: PV Accel Time:  148 msec (>120ms) PV Max Raf:     0.9 m/s  (0.6-0.9m/s) PV Max PG:      3.3 mmHg  PULMONARY VEINS: PulmV A Revs Dur: 82.00 msec PulmV A Revs Raf: 31.20 cm/s PulmV Vera Raf:   36.60 cm/s PulmV S/D Raf:    1.80 PulmV Sys Raf:    65.10 cm/s  [id number] Andreas Ortega MD Electronically signed on 2025 at 5:08:37 PM  Wall Scoring  ** Final **          BNP   Date/Time Value Ref Range Status   2025 11:02  (H) 0 - 99 pg/mL Final       Test Results Pending At Discharge  Pending Labs       Order Current Status    Vitamin B1, whole blood In process    Vitamin B1, whole blood In process            Hospital Course  34 year-old female with past medical history of prior lacunar infarct (2018), hypertension, hyperlipidemia, migraines, DUSTIN, and depression who presented to [facility] ED on 25 with 10-day history of headache. Patient found to have superior sagittal and right sigmoid  "venous sinus thrombosis. Patient transferred to [facility] for further neurological management after developing worsening headache and vision loss after initiating heparin infusion.     Per chart review, patient presented to [facility]SH ED with 3-week history of flu-like symptoms and 1.5 weeks of right eye blurry vision with constant frontal head pressure. Patient found to have superior sagittal to right sigmoid sinus CVST. On 2/12/25, patient with continued headache, worsened lethargy with agitation, and worsened vision loss concerning for Cushing's reflex. Decision made to transfer to [facility] for further monitoring, ophthalmology evaluation, and possible neurosurgical intervention for CVST removal.  Repeat CTH on arrival to [facility] 2/12 demonstrated no bleeding.    Discussed with patient about our recommendations to start lovenox injections for CVST; however, patient was adamant about pill form of medication d/t needle-phobia. Decision to discharge on Apixaban; however patient does not have insurance. Working with social work to help connect her with Medicaid; obtained 1 month supply of Apixaban from [facility].    On day of discharge 2/15, patient reported her headaches were getting worse and initially refused to go home. Later she made remarks such as \"just end this misery\" and gestured pointing a gun to her head. Psychiatry was consulted for suicidal ideation evaluation. While updating the patient and her family she became verbally and physically aggressive. Code violet was called. Psychiatry deemed she fit the criteria for inpatient psychiatry admission.       To do  [ ] Has thrombocytosis low zinc 40 might be connected to her thrombocytosis   [ ] follow up labs: FVL, JAK2, prothrombin  ***    Home Medications     Medication List      START taking these medications     acetaminophen 325 mg tablet; Commonly known as: Tylenol; Take 2 tablets   (650 mg) by mouth every 6 hours if needed for headaches.   acetaZOLAMIDE 250 mg " tablet; Commonly known as: Diamox; Take 2 tablets   (500 mg) by mouth 2 times a day.   apixaban 5 mg tablet; Commonly known as: Eliquis; Take 1 tablet (5 mg)   by mouth 2 times a day.   divalproex 250 mg EC tablet; Commonly known as: Depakote; Take 1 tablet   (250 mg) by mouth 2 times a day. Do not crush, chew, or split.   fluticasone 50 mcg/actuation nasal spray; Commonly known as: Flonase;   Administer 2 sprays into each nostril once daily. Shake gently. Before   first use, prime pump. After use, clean tip and replace cap.   folic acid 1 mg tablet; Commonly known as: Folvite; Take 1 tablet (1 mg)   by mouth once daily.   thiamine 100 mg tablet; Commonly known as: Vitamin B-1; Take 1 tablet   (100 mg) by mouth once daily.       Pertinent Physical Exam At Time of Discharge  Physical Exam  Neurological Exam  Outpatient Follow-Up  No future appointments.    Ravi Kiser MD

## 2025-02-16 NOTE — PROGRESS NOTES
Communication Note- PT discharge note    Patient Name: Shreya Garces  MRN: [id number]  Today's Date: 2/16/2025   Room: [facility]A    Discipline: Physical Therapy      Missed Visit Reason:  (Pt refusing therapy services stating up indep and does not have the money to pay for therapy, educated on role of therapy and goals of session, continued to refuse. RN reports pt is up independent. Will d/c PT orders. If needs change please re-order)      02/16/25 at 8:47 AM   Janee Shah, PT   Rehab Office: [phone]

## 2025-02-16 NOTE — PROGRESS NOTES
Transitional Care Coordination     TCC requested stretcher transport for 1715 to [facility].  Awaiting verified time.     Addendum 1638     Patient is not currently insured [facility] will have to pay for transport bill.     Addendum 1731     Transport is scheduled for 2045 bedside RN and MD made aware via secure chat.     Ellie Ford RN, BSN CM   PRN Care Coordinator

## 2025-02-16 NOTE — SIGNIFICANT EVENT
02/16/25 1553   Code Violet   Code Violet Initated by ANYI Palomo   Pager Date 02/16/25   Pager Time 1529   Individual Involved Patient   Location/Room LT 4013   Arrival Date 02/16/25   Arrival Time 1534   Visit Reason Acute Idiopathic CVST   Present at Code Primary provider;Nurse;Supervisor;Police services;Rapid Response nurse   Primary Reason for Call Non re-directable   Interventions 1:1 monitoring;Verbal de-escalation;Patient education;Stimulus reduciton   Description of Event Primary team at bedside to discuss plan of care. Patient became verbally agitated due to her plan of care. Verbal deescalation successful. 1:1 observer remains at the bedside.   Medications Administered No medications administered   PRN Medication Available Yes   Physical Contact To   (N/A)   Plan and Interventions Going Forward Continue to provide emotional support, Continue 1:1 monitoring for safety, 1:1 observer at the bedside. Call DIEGO for support prior to transfer arrival.   Plan Reviewed with patient   Outcome Situation resolved;1:1 monitoring;Remained on division   Event End Date 02/16/25   Event End Time 1550

## 2025-02-16 NOTE — NURSING NOTE
"DIEGO Progress Note:     DIEGO services providing follow-up assessment due to code violet today at 1446.     Patient transferred to LT 4013 at 1916 this evening. Per bedside nurse, patient has been sleeping since she assumed care of the patient. On assessment, patient is resting in bed quietly. DIEGO RN did not disturb patient in order to promote rest during hospitalization.     DIEGO services available to patient and staff 24/7 throughout hospitalization. DIEGO will continue to perform q12 hr rounding to ensure safety of patient and staff. Please secure chat \"DIEGO\" with any questions or concerns.   "

## 2025-02-16 NOTE — PROGRESS NOTES
Subjective    Since last progress note code javon was called as patient was screaming at family members and throwing objects at them (look at significant event from yesterday). Psychiatry evaluated her and recommended inpatient psych. Pending transfer.   She is still insistent on going home. She doesn't want to stay here and pay for all this.     Objective   Heart Rate:  [55-74]   Temp:  [36.1 °C (97 °F)-36.8 °C (98.2 °F)]   Resp:  [18-19]   BP: (127-154)/(80-96)   Weight:  [56 kg (123 lb 6.4 oz)]   SpO2:  [96 %-100 %]     PHYSICAL EXAM:  GENERAL APPEARANCE:  No distress, alert, interactive and cooperative    MENTAL STATE:   Orientation was normal to time, place and person. Recent and remote memory was intact.  Attention span and concentration were normal. Language testing was normal for comprehension, repetition, expression, and naming.     CRANIAL NERVES:   CN 2   Visual fields full to confrontation.   CN 3, 4, 6   Pupils round, 4 mm in diameter, equally reactive to light. Lids symmetric; no ptosis. EOMs normal alignment, full range with normal pursuit and convergence.    CN 5   Facial sensation intact bilaterally.   CN 7   Normal and symmetric facial strength. Nasolabial folds symmetric.   CN 8   Hearing intact to conversation  CN 11   Normal strength of shoulder shrug    CN 12   Tongue midline, with normal bulk and strength; no fasciculations.     MOTOR:   Muscle bulk and tone were normal in both upper and lower extremities.   No fasciculations, tremor or other abnormal movements were present.   Moving all extremities spontaneously antigravity     SENSORY:   In both upper and lower extremities, sensation was intact to light touch           MEDICATIONS:  Scheduled: PRN: Continuous:   acetaminophen, 650 mg, oral, q6h  acetaZOLAMIDE, 500 mg, oral, BID  apixaban, 5 mg, oral, BID  divalproex, 250 mg, oral, q12h DARCI  fluticasone, 2 spray, Each Nostril, Daily  folic acid, 1 mg, oral, Daily  melatonin, 5 mg, oral,  Nightly  pantoprazole, 40 mg, intravenous, Daily  polyethylene glycol, 17 g, oral, Daily  potassium chloride CR, 20 mEq, oral, Once  sennosides-docusate sodium, 2 tablet, oral, BID  thiamine, 100 mg, oral, Daily  tropicamide, 1 drop, Both Eyes, Once     PRN medications: OLANZapine, OLANZapine zydis, ondansetron **OR** ondansetron         LAB RESULTS:  [unfilled]  Results from last 72 hours   Lab Units 02/15/25  0810 02/14/25  0446   WBC AUTO x10*3/uL 13.5* 13.4*   NRBC AUTO /100 WBCs 0.3* 0.9*   RBC AUTO x10*6/uL 4.63 4.54   HEMOGLOBIN g/dL 8.6* 8.2*   HEMATOCRIT % 32.2* 30.0*   MCV fL 70* 66*   MCH pg 18.6* 18.1*   MCHC g/dL 26.7* 27.3*   RDW % 27.1* 26.0*   PLATELETS AUTO x10*3/uL 538* 525*   NEUTROS PCT AUTO % 68.2 70.8   IG PCT AUTO % 1.4* 1.9*   LYMPHS PCT AUTO % 22.9 19.1   MONOS PCT AUTO % 6.6 7.5   EOS PCT AUTO % 0.4 0.2   BASOS PCT AUTO % 0.5 0.5   NEUTROS ABS x10*3/uL 9.19* 9.46*   IG AUTO x10*3/uL 0.19 0.26   LYMPHS ABS AUTO x10*3/uL 3.09 2.56   MONOS ABS AUTO x10*3/uL 0.89 1.00   EOS ABS AUTO x10*3/uL 0.05 0.03   BASOS ABS AUTO x10*3/uL 0.07 0.07        IMAGING RESULTS:  CT head wo IV contrast   Final Result   1. Similar findings consistent with extensive dural venous thrombosis.   2. No evidence of large ischemic infarct or intraparenchymal   hemorrhage.   3. Similar paranasal sinus disease. The presence of fluid in the left   maxillary sinus could reflect acute sinusitis in the appropriate   clinical setting.                  I personally reviewed the images/study and I agree with the findings   as stated by Gordon Sweeney DO, PGY-3.        MACRO:   None        Signed by: Cynthia Maki 2/13/2025 5:50 AM   Dictation workstation:   [id number]             Assessment/Plan    34 year-old female with past medical history of prior lacunar infarct (7/2018), hypertension, hyperlipidemia, migraines, DUSTIN, and depression who presented to [facility] ED on 2/11/25 with 10-day history of headache. Patient found to have  superior sagittal and right sigmoid venous sinus thrombosis. Patient transferred to [facility] for further neurological management after developing worsening headache and vision loss after initiating heparin infusion.     Per chart review, patient presented to [facility] ED with 3-week history of flu-like symptoms and 1.5 weeks of right eye blurry vision with constant frontal head pressure. Patient found to have superior sagittal to right sigmoid sinus CVST. On 2/12/25, patient with continued headache, worsened lethargy with agitation, and worsened vision loss concerning for Cushing's reflex. She was [place] [facility] [place]. She was transitioned from heparin gtt to eliquis. Currently managing ongoing headaches with PRN migraine cocktail.     Updates 02/16/25:  - Since last progress note code javon was called as patient was screaming at family members and throwing objects at them (look at significant event from yesterday). Psychiatry evaluated her and recommended inpatient psych. Pending transfer.     #CVST within posterior superior sagittal, straight, right transverse and right sigmoid sinus  #Prior lacunar infarcts  #Migraines  Assessment:  - Neurologically: see above  - See above history and significant events  - Bradycardic (since admission), hypertensive, bradypneic, decreased mentation vs. agitation  - CTH on arrival 2/12: no bleeding  Plan:  - Floor  - Low intensity heparin infusion with PRN bolus  - Continue Diamox 500mg BID for increased ICP  - No LP at this time, will continue empiric Abx for meningoencephalitis and follow BCx  - Folic acid 1mg, melatonin 5mg nightly  - Neuro Checks: Q4H  - Sedation: None  - Pain: acetaminophen PRN and hydromorphone PRN  - Nausea: ondansetron  - PT/OT/SLP      # Agitation  # Suicidal ideation:   - PRN Zyprexa 2.5 mg Q 6H  - Psychiatry consulted appreciate recs    #HTN  #HLD  #Bradycardia, asymptomatic  - ECHO 2/11 - LVEF 64% (Willams's biplane), normal right global  systolic function, RVSP normal  - Continue to monitor on telemetry  - BP goal: SBP >100, SBP <180  - Concern for cushing reflex in setting of CVST    #Microcytic hypoproliferative anemia  Assessment:  - Baseline Hgb: 9.8  - Baseline Plts: 307  - TIBC 2.7%  - Reticulocyte Production Index (RPI) 1.0 (inadequate repsonse)  - Corrected Reticulocyte Percentage 1.47%  Results from last 7 days   Lab Units 02/15/25  0810 02/14/25  0446 02/13/25  0016   HEMOGLOBIN g/dL 8.6* 8.2* 7.3*   HEMATOCRIT % 32.2* 30.0* 26.8*   PLATELETS AUTO x10*3/uL 538* 525* 530*     Plan:  - Continue to monitor with daily CBC and Coag panel  - Resume Venofer 300mg IV for 1 additional dose (3 total on 2/11, 2/12, 2/13)  - Hemoglobin electrophoresis/identification sent per recommendation of Cardiology at OSH  - Peripheral smear sent  - Hypercoagulable workup pending  - Sent anti beta-2 glycoprotein    Pain medications: PRN Dilaudid Tylenol Oxycodone  Fluids: Replete PRN  Electrolytes: Keep mg >2, phos >3  and K >4  Nutrition:  Adult diet Regular   Antimicrobials: Ceftriaxone  Anticoagulation: High dose heparin  DVT PPX: Heparin Drip  GI ppx: Pantoprazole 40mg daily  Bowel care: Miralax  Catheter: None  Lines: PIV  Oxygen: Room Air    Disposition: home    Code Status: Full Code (confirmed on admission)   NOK:  Primary Emergency Contact: Melisa Garces, Home Phone: [phone]     Ravi Kiser MD  PGY-3 Neurology

## 2025-02-16 NOTE — SIGNIFICANT EVENT
Application for Emergency Admission      Ready for Transfer?  Is the patient medically cleared for transfer to inpatient psychiatry: Yes  Has the patient been accepted to an inpatient psychiatric hospital: Yes    Application for Emergency Admission  IN ACCORDANCE WITH [place]  The Chief Clinical Officer of: Bandar Moore  2/16/2025 .1:45 PM    Reason for Hospitalization  The undersigned has reason to believe that: Shreya Garces Is a mentally ill person subject to hospitalization by court order under division B Section 5122.01 of the Revised Code, i.e., this person:    1.Yes  Represents a substantial risk of physical harm to self as manifested by evidence of threats of, or attempts at, suicide or serious self-inflicted bodily harm    2.Yes Represents a substantial risk of physical harm to others as manifested by evidence of recent homicidal or other violent behavior, evidence of recent threats that place another in reasonable fear of violent behavior and serious physical harm, or other evidence of present dangerousness    3.Yes Represents a substantial and immediate risk of serious physical impairment or injury to self as manifested by  evidence that the person is unable to provide for and is not providing for the person's basic physical needs because of the person's mental illness and that appropriate provision for those needs cannot be made  immediately available in the community    4.Yes Would benefit from treatment in a hospital for his mental illness and is in need of such treatment as manifested by evidence of behavior that creates a grave and imminent risk to substantial rights of others or  himself.    5.Yes Would benefit from treatment as manifested by evidence of behavior that indicates all of the following:       (a) The person is unlikely to survive safely in the community without supervision, based on a clinical determination.       (b) The person has a history of lack of compliance  with treatment for mental illness and one of the following applies:      (i) At least twice within the thirty-six months prior to the filing of an affidavit seeking court-ordered treatment of the person under section 5122.111 of the Revised Code, the lack of compliance has been a significant factor in necessitating hospitalization in a hospital or receipt of services in a forensic or other mental health unit of a correctional facility, provided that the thirty-six-month period shall be extended by the length of any hospitalization or incarceration of the person that occurred within the thirty-six-month period.      (ii) Within the forty-eight months prior to the filing of an affidavit seeking court-ordered treatment of the person under section 5122.111 of the Revised Code, the lack of compliance resulted in one or more acts of serious violent behavior toward self or others or threats of, or attempts at, serious physical harm to self or others, provided that the forty-eight-month period shall be extended by the length of any hospitalization or incarceration of the person that occurred within the forty-eight-month period.      (c) The person, as a result of mental illness, is unlikely to voluntarily participate in necessary treatment.       (d) In view of the person's treatment history and current behavior, the person is in need of treatment in order to prevent a relapse or deterioration that would be likely to result in substantial risk of serious harm to the person or others.    (e) Represents a substantial risk of physical harm to self or others if allowed to remain at liberty pending examination.    Therefore, it is requested that said person be admitted to the above named facility.    STATEMENT OF BELIEF    Must be filled out by one of the following: a psychiatrist, licensed physician, licensed clinical psychologist, health or ,  or .  (Statement shall include the circumstances  under which the individual was taken into custody and the reason for the person's belief that hospitalization is necessary. The statement shall also include a reference to efforts made to secure the individual's property at his residence if he was taken into custody there. Every reasonable and appropriate effort should be made to take this person into custody in the least conspicuous manner possible.)    Patient is irritable, showing considerable difficulty with emotional regulation. Hostile to providers, family members and staff. She is unable to reason through medical decision and minimizing concerns of her family. Given all above she will benefit from inpatient psychiatric stabilization.         Ravi Kiser MD 2/16/2025     _____________________________________________________________   Place of Employment: [employer]    STATEMENT OF OBSERVATION BY PSYCHIATRIST, LICENSED PHYSICIAN, OR LICENSED CLINICAL PSYCHOLOGIST, IF APPLICABLE    Place of Observation (e.g., [facility] mental health center, general hospital, office, emergency facility)  (If applicable, please complete)    Ravi Kiser MD 2/16/2025    _____________________________________________________________

## 2025-02-17 ENCOUNTER — TELEPHONE (OUTPATIENT)
Dept: OTOLARYNGOLOGY | Facility: HOSPITAL | Age: 35
End: 2025-02-17

## 2025-02-17 ENCOUNTER — TELEPHONE (OUTPATIENT)
Dept: OTOLARYNGOLOGY | Facility: CLINIC | Age: 35
End: 2025-02-17

## 2025-02-17 VITALS
HEART RATE: 62 BPM | WEIGHT: 123.4 LBS | RESPIRATION RATE: 16 BRPM | OXYGEN SATURATION: 100 % | BODY MASS INDEX: 21.18 KG/M2 | SYSTOLIC BLOOD PRESSURE: 139 MMHG | DIASTOLIC BLOOD PRESSURE: 94 MMHG | TEMPERATURE: 98.2 F

## 2025-02-17 LAB
ELECTRONICALLY SIGNED BY: NORMAL
FACTOR II-PROTHROMBIN INTERPRETATION: NORMAL
FACTOR II-PROTHROMBIN RESULT: NORMAL
FACTOR V LEIDEN INTERPRETATION: NORMAL
FACTOR V LEIDEN RESULT: NORMAL
JAK2 V617F INTERPRETATION: NORMAL
JAK2 V617F RESULT: NOT DETECTED
VIT B1 PYROPHOSHATE BLD-SCNC: 196 NMOL/L (ref 70–180)

## 2025-02-17 PROCEDURE — 2500000002 HC RX 250 W HCPCS SELF ADMINISTERED DRUGS (ALT 637 FOR MEDICARE OP, ALT 636 FOR OP/ED)

## 2025-02-17 RX ADMIN — OLANZAPINE 2.5 MG: 5 TABLET, ORALLY DISINTEGRATING ORAL at 02:26

## 2025-02-18 ENCOUNTER — TELEPHONE (OUTPATIENT)
Dept: OTOLARYNGOLOGY | Facility: HOSPITAL | Age: 35
End: 2025-02-18

## 2025-02-19 LAB — VIT B1 PYROPHOSHATE BLD-SCNC: 214 NMOL/L (ref 70–180)

## 2025-02-20 ENCOUNTER — TELEPHONE (OUTPATIENT)
Dept: OTOLARYNGOLOGY | Facility: HOSPITAL | Age: 35
End: 2025-02-20

## 2025-02-20 NOTE — TELEPHONE ENCOUNTER
3rd attempt to reach pt and schedule ENT consult. Certified letter sent requesting pt to schedule.

## 2025-02-21 ENCOUNTER — PHARMACY VISIT (OUTPATIENT)
Dept: PHARMACY | Facility: CLINIC | Age: 35
End: 2025-02-21
Payer: COMMERCIAL

## 2025-02-21 PROCEDURE — RXMED WILLOW AMBULATORY MEDICATION CHARGE

## 2025-02-26 ENCOUNTER — APPOINTMENT (OUTPATIENT)
Dept: OPHTHALMOLOGY | Facility: CLINIC | Age: 35
End: 2025-02-26

## 2025-03-02 LAB
ATRIAL RATE: 71 BPM
P AXIS: 85 DEGREES
P OFFSET: 186 MS
P ONSET: 140 MS
PR INTERVAL: 152 MS
Q ONSET: 216 MS
QRS COUNT: 8 BEATS
QRS DURATION: 90 MS
QT INTERVAL: 524 MS
QTC CALCULATION(BAZETT): 473 MS
QTC FREDERICIA: 489 MS
R AXIS: 66 DEGREES
T AXIS: 72 DEGREES
T OFFSET: 478 MS
VENTRICULAR RATE: 49 BPM

## 2025-04-02 ENCOUNTER — APPOINTMENT (OUTPATIENT)
Dept: OPHTHALMOLOGY | Facility: CLINIC | Age: 35
End: 2025-04-02

## 2025-05-04 ENCOUNTER — HOSPITAL ENCOUNTER (EMERGENCY)
Facility: HOSPITAL | Age: 35
Discharge: HOME | End: 2025-05-04
Attending: STUDENT IN AN ORGANIZED HEALTH CARE EDUCATION/TRAINING PROGRAM

## 2025-05-04 VITALS
RESPIRATION RATE: 17 BRPM | BODY MASS INDEX: 23.9 KG/M2 | DIASTOLIC BLOOD PRESSURE: 76 MMHG | WEIGHT: 140 LBS | HEART RATE: 77 BPM | OXYGEN SATURATION: 98 % | SYSTOLIC BLOOD PRESSURE: 134 MMHG | TEMPERATURE: 97.7 F | HEIGHT: 64 IN

## 2025-05-04 DIAGNOSIS — F10.920 ALCOHOLIC INTOXICATION WITHOUT COMPLICATION: Primary | ICD-10-CM

## 2025-05-04 LAB — GLUCOSE BLD MANUAL STRIP-MCNC: 104 MG/DL (ref 74–99)

## 2025-05-04 PROCEDURE — 99284 EMERGENCY DEPT VISIT MOD MDM: CPT | Performed by: STUDENT IN AN ORGANIZED HEALTH CARE EDUCATION/TRAINING PROGRAM

## 2025-05-04 PROCEDURE — 82947 ASSAY GLUCOSE BLOOD QUANT: CPT

## 2025-05-04 PROCEDURE — 99285 EMERGENCY DEPT VISIT HI MDM: CPT

## 2025-05-04 PROCEDURE — 96372 THER/PROPH/DIAG INJ SC/IM: CPT | Performed by: STUDENT IN AN ORGANIZED HEALTH CARE EDUCATION/TRAINING PROGRAM

## 2025-05-04 PROCEDURE — 2500000004 HC RX 250 GENERAL PHARMACY W/ HCPCS (ALT 636 FOR OP/ED): Performed by: STUDENT IN AN ORGANIZED HEALTH CARE EDUCATION/TRAINING PROGRAM

## 2025-05-04 RX ORDER — OLANZAPINE 10 MG/2ML
10 INJECTION, POWDER, FOR SOLUTION INTRAMUSCULAR ONCE
Status: COMPLETED | OUTPATIENT
Start: 2025-05-04 | End: 2025-05-04

## 2025-05-04 RX ADMIN — OLANZAPINE 10 MG: 10 INJECTION, POWDER, FOR SOLUTION INTRAMUSCULAR at 06:11

## 2025-05-04 SDOH — HEALTH STABILITY: MENTAL HEALTH
BEHAVIORS/MOOD: AGITATED;UNCOOPERATIVE;AGGRESSIVE PHYSICALLY, OTHERS;HOSTILE;NON-COMPLIANT;ANGRY;APPEARS INTOXICATED;AGGRESSIVE VERBALLY, OTHERS;CRYING;FLIGHT OF IDEAS;PACING;INAPPROPRIATE;IRRITABLE

## 2025-05-04 SDOH — HEALTH STABILITY: MENTAL HEALTH: NEEDS EXPRESSED: DENIES

## 2025-05-04 SDOH — HEALTH STABILITY: MENTAL HEALTH: BEHAVIORS/MOOD: CALM

## 2025-05-04 SDOH — HEALTH STABILITY: MENTAL HEALTH: CONTENT: BLAMING OTHERS

## 2025-05-04 SDOH — SOCIAL STABILITY: SOCIAL INSECURITY

## 2025-05-04 SDOH — HEALTH STABILITY: MENTAL HEALTH
OTHER SUICIDE PRECAUTIONS INCLUDE: REMAINING RISKS IDENTIFIED AND MITIGATED;PROVIDER NOTIFIED;FAMILY/VISITOR ADVISED TO MAINTAIN CONTROL OF OWN PERSONAL BELONGINGS IN ROOM;HOURLY BEHAVIORAL ASSESSMENT PERFORMED;PERSONAL BELONGINGS SECURED;TREATMENT PLAN BASED ON RISK FACTORS DEVELOPED (ED ONLY - IF PATIENT IN ED MORE THAN 8 HOURS);VISITORS LIMITED WHEN NECESSARY AND PERSONAL ITEMS SCREENED

## 2025-05-04 SDOH — HEALTH STABILITY: MENTAL HEALTH: SLEEP PATTERN: RESTLESSNESS

## 2025-05-04 SDOH — HEALTH STABILITY: MENTAL HEALTH

## 2025-05-04 SDOH — HEALTH STABILITY: MENTAL HEALTH: BEHAVIORAL HEALTH(WDL): EXCEPTIONS TO WDL

## 2025-05-04 SDOH — SOCIAL STABILITY: SOCIAL NETWORK: PARENT/GUARDIAN/SIGNIFICANT OTHER INVOLVEMENT: NO INVOLVEMENT

## 2025-05-04 SDOH — SOCIAL STABILITY: SOCIAL INSECURITY: FAMILY BEHAVIORS: UNCOOPERATIVE;NON-COMPLIANT;AGGRESSIVE PHYSICALLY;AGGRESSIVE VERBALLY

## 2025-05-04 ASSESSMENT — PAIN - FUNCTIONAL ASSESSMENT: PAIN_FUNCTIONAL_ASSESSMENT: 0-10

## 2025-05-04 ASSESSMENT — LIFESTYLE VARIABLES
NAUSEA AND VOMITING: NO NAUSEA AND NO VOMITING
ORIENTATION AND CLOUDING OF SENSORIUM: ORIENTED AND CAN DO SERIAL ADDITIONS
AGITATION: NORMAL ACTIVITY
PAROXYSMAL SWEATS: NO SWEAT VISIBLE
HEADACHE, FULLNESS IN HEAD: NOT PRESENT
TOTAL SCORE: 0
AUDITORY DISTURBANCES: NOT PRESENT
ANXIETY: NO ANXIETY, AT EASE
TREMOR: NO TREMOR
VISUAL DISTURBANCES: NOT PRESENT

## 2025-05-04 ASSESSMENT — PAIN SCALES - GENERAL
PAINLEVEL_OUTOF10: 0 - NO PAIN
PAINLEVEL_OUTOF10: 0 - NO PAIN

## 2025-05-04 ASSESSMENT — PAIN DESCRIPTION - PROGRESSION: CLINICAL_PROGRESSION: NOT CHANGED

## 2025-05-04 NOTE — ED NOTES
"Patient awoke at 1225 and asked this RN what time it was; this RN told her that it was 1225; patient stated that she was two hours late for work and wanted to call an uber to leave; this RN located her belongings and verified with her that they were her's ; patient immediately stated that she had $1200 missing in cash and stated that we stole her fucking money\" this RN stated that I am not her nurse and that I would call security and we would get it sorted out ; patient continued to scream and curse at staff including the PATEL; security to bedside; patient screaming at this RN to get her  \"a fucking \" this RN updated her that we do not provide those and that there is a charging station in the waiting room; patient continued screaming at staff that we stole her money and that we needed to give her a ; Discharge papers were printed and patient was given her money by security; Patient was told that she could charge her phone in the waiting room and told that she could go; patient was walked to waiting room by HARRIS Epps LPN.     Ratna Tyler RN  05/04/25 [id number]    "

## 2025-05-04 NOTE — Clinical Note
Shreya Garces was seen and treated in our emergency department on 5/4/2025.  She may return to work on 05/05/2025.       If you have any questions or concerns, please don't hesitate to call.      Anastasiya Recio MD

## 2025-05-04 NOTE — DISCHARGE INSTRUCTIONS
Please abstain from drinking alcohol if it is causing harm.  If you have difficulty with alcohol use or withdrawals, follow-up with your primary care doctor return to the emergency department.

## 2025-05-04 NOTE — PROGRESS NOTES
Behavioral Restraint / Seclusion Face to Face Assessment    Patient Name:         Shreya Garces  YOB: 1990  Medical Record #:   [id number]      Documentation of restraint type placed:      Date Assessment was completed: 5/4/2025    Time patient was assessed: 6:17 AM     Description of behavior causing restraint/seclusion: combative and striking out at staff or others    Type of intervention: Physical restraint (holding) and Chemical    Patient's immediate situation: aggressive    Alternatives Attempted: Alternatives attempted and have been ineffective.    Contraindications for Restraints: Reviewed contraindications for continued restraint use and agree to on-going need.    The medication administered is appropriate for the patient's condition based on imminent danger failing alternatives attempted: Yes    Patent's reaction to intervention: appears safe and appears to be tolerating restraint/seclusion without distress or adverse response    Patient's medical condition: vital signs stable    Patient's behavioral condition: Other no longer combative towards staff    Plan: Discontinue restraints now

## 2025-05-04 NOTE — ED PROVIDER NOTES
EMERGENCY DEPARTMENT ENCOUNTER      Pt Name: Shreya Garces  MRN: [id number]  Birthdate 1990  Date of evaluation: 5/4/2025  Provider: Marcio Graves DO    CHIEF COMPLAINT       Chief Complaint   Patient presents with    Alcohol Intoxication     Pt ETOH , pt not being cooperative, yelling screaming @ staff, not easy redirected. Nurses, [employer], & security @ bedside. Pt will not stay in the bed, refusing to let staff do VS, will not answer any questions.         HISTORY OF PRESENT ILLNESS    HPI  34-year-old female with past medical history significant for lacunar infarct in 2018, hypertension, hyperlipidemia, migraines, generalized anxiety disorder and depression, sigmoid venous sinus thrombosis in 2/25 presenting to the emergency department for alcohol intoxication. Per [place],  patient got into an altercation with her [employer]  who called 911 and patient was combative and agitated screaming at police and told them if she is taken to [facility] she will kill herself prompting them to bring patient to the emergency department for psychiatric evaluation.  On arrival patient is agitated screaming unwilling to answer questions.    Nursing Notes were reviewed.    PAST MEDICAL HISTORY   Medical History[1]      SURGICAL HISTORY     Surgical History[2]      CURRENT MEDICATIONS       Discharge Medication List as of 5/4/2025 12:35 PM        CONTINUE these medications which have NOT CHANGED    Details   acetaminophen (Tylenol) 325 mg tablet Take 2 tablets (650 mg) by mouth every 6 hours if needed for headaches., Starting Sat 2/15/2025, Normal      acetaZOLAMIDE (Diamox) 250 mg tablet Take 2 tablets (500 mg) by mouth 2 times a day., Starting Sat 2/15/2025, Until Fri 5/16/2025, Normal      apixaban (Eliquis) 5 mg tablet Take 1 tablet (5 mg) by mouth 2 times a day., Starting Sat 2/15/2025, Until Sun 3/23/2025, Normal      divalproex (Depakote) 250 mg EC tablet Take 1 tablet (250 mg) by mouth 2 times a  day. Do not crush, chew, or split., Starting Sat 2/15/2025, Until Wed 4/16/2025, Normal      fluticasone (Flonase) 50 mcg/actuation nasal spray Administer 2 sprays into each nostril once daily. Shake gently. Before first use, prime pump. After use, clean tip and replace cap., Starting Sun 2/16/2025, Normal             ALLERGIES     Atorvastatin and Penicillins    FAMILY HISTORY     Family History[3]       SOCIAL HISTORY     Social History[4]    SCREENINGS                        PHYSICAL EXAM    (up to 7 for level 4, 8 or more for level 5)     ED Triage Vitals [05/04/25 0721]   Temperature Heart Rate Respirations BP   36.5 °C (97.7 °F) 74 16 134/76      Pulse Ox Temp Source Heart Rate Source Patient Position   96 % Temporal Monitor Sitting      BP Location FiO2 (%)     Left arm --       Physical Exam  Vitals and nursing note reviewed.   Constitutional:       Appearance: Normal appearance. She is not ill-appearing, toxic-appearing or diaphoretic.   HENT:      Head: Normocephalic and atraumatic.   Pulmonary:      Comments: No audible wheezes, rales, or rhonchi patient is not tachypneic and with no respiratory distress.  Neurological:      Mental Status: She is alert.   Psychiatric:      Comments: Patient is agitated, refusing to answer questions or cooperate with exam.  Appears to be clinically intoxicated and does not appear to be internally stimulated and is not voicing any SI at this time.  Does not appear to be overtly psychotic.          DIAGNOSTIC RESULTS     LABS:  Labs Reviewed   POCT GLUCOSE - Abnormal       Result Value    POCT Glucose 104 (*)    POCT GLUCOSE METER       All other labs were within normal range or not returned as of this dictation.    Imaging  No orders to display        Procedures  Procedures     EMERGENCY DEPARTMENT COURSE/MDM:     ED Course as of 05/05/25 2153   Sun May 04, 2025   0606 I called her  Melisa in the chart [id number], went to unidentified voicemail, to attempt to  find safe discharge home as patient is screaming and yelling and does not appear to be safe to be released [JH]   0609 I reevaluated patient several times, she is sleeping comfortably, breathing easily, arousable, still not allowing us to take vitals or initiate workup.  As patient has made no suicidal statements to us, but has just expressed extreme frustration with medical bills and that the  was called on her by the [employer]  and that she has to be here in the ED, will not insist on labs being drawn.  Will monitor closely and if mentation improves, may be able to metabolize and return home this a.m. [JH]   0758 POCT Glucose(!): 104 [FN]      ED Course User Index  [FN] Anastasiya Recio MD  [JH] Sukhwinder Walsh MD         Diagnoses as of 05/05/25 2153   Alcoholic intoxication without complication        Medical Decision Making    34-year-old female with past medical history significant for lacunar infarct in 2018, hypertension, hyperlipidemia, migraines, generalized anxiety disorder and depression, sigmoid venous sinus thrombosis in 2/25 presenting to the emergency department for alcohol intoxication.  On arrival patient was violent and aggressive with staff refusing vitals or physical examination for medical clearance and appears to be clinically intoxicated. Patient had to be restrained with physical hold for administration of 10 mg of Zyprexa for patient and staff safety. Attempted to call patient's emergency contact which went straight to voicemail.  Plan to observe patient in the emergency department until she henry up for reevaluation and possible discharge if her mentation improves.  Signed out to morning resident pending reevaluation once clinically sober.    Patient and or family in agreement and understanding of treatment plan.  All questions answered.      I reviewed the case with the attending ED physician. The attending ED physician agrees with the plan. Patient and/or patient´s representative was  counseled regarding labs, imaging, likely diagnosis, and plan. All questions were answered.    ED Medications administered this visit:    Medications   OLANZapine (ZyPREXA) injection 10 mg (10 mg intramuscular Given 25 0611)       New Prescriptions from this visit:    Discharge Medication List as of 2025 12:35 PM          Follow-up:  No follow-up provider specified.      Final Impression:   1. Alcoholic intoxication without complication          (Please note that portions of this note were completed with a voice recognition program.  Efforts were made to edit the dictations but occasionally words are mis-transcribed.)       Marcio Graves,   Resident  [phone]         [1]   Past Medical History:  Diagnosis Date    Other conditions influencing health status     History of back problems    Personal history of other diseases of the circulatory system     History of essential hypertension    Personal history of other diseases of the nervous system and sense organs     History of migraine   [2]   Past Surgical History:  Procedure Laterality Date     SECTION, CLASSIC  2017     Section    MR HEAD ANGIO WO IV CONTRAST  2019    MR HEAD ANGIO WO IV CONTRAST 2019 [facility]   [3] No family history on file.  [4]   Social History  Socioeconomic History    Marital status: Single   Tobacco Use    Smoking status: Never    Smokeless tobacco: Never   Vaping Use    Vaping status: Every Day    Substances: Nicotine    Devices: Disposable   Substance and Sexual Activity    Alcohol use: Yes     Comment: Occasionally    Sexual activity: Yes     Social Drivers of Health     Financial Resource Strain: Low Risk  (2025)    Overall Financial Resource Strain (CARDIA)     Difficulty of Paying Living Expenses: Not hard at all   Food Insecurity: No Food Insecurity (2025)    Hunger Vital Sign     Worried About Running Out of Food in the Last Year: Never true     Ran Out of Food  in the Last Year: Never true   Transportation Needs: No Transportation Needs (2/11/2025)    PRAPARE - Transportation     Lack of Transportation (Medical): No     Lack of Transportation (Non-Medical): No   Physical Activity: Insufficiently Active (5/17/2021)    Received from [employer]    Exercise Vital Sign     Days of Exercise per Week: 1 day     Minutes of Exercise per Session: 30 min   Stress: No Stress Concern Present (5/17/2021)    Received from [employer] [facility]    [employer] [place] [employer] - Occupational Stress Questionnaire     Feeling of Stress : Only a little   Social Connections: Moderately Integrated (5/17/2021)    Received from [employer]    Social Connection and Isolation Panel [NHANES]     Frequency of Communication with Friends and Family: More than three times a week     Frequency of Social Gatherings with Friends and Family: More than three times a week     Attends Judaism Services: More than 4 times per year     Active Member of Clubs or Organizations: No     Attends Club or Organization Meetings: Never     Marital Status: Living with partner   Intimate Partner Violence: Not At Risk (2/11/2025)    Humiliation, Afraid, Rape, and Kick questionnaire     Fear of Current or Ex-Partner: No     Emotionally Abused: No     Physically Abused: No     Sexually Abused: No   Housing Stability: Low Risk  (2/11/2025)    Housing Stability Vital Sign     Unable to Pay for Housing in the Last Year: No     Number of Times Moved in the Last Year: 1     Homeless in the Last Year: No        Sukhwinder Walsh MD  05/05/25 3043

## 2025-05-04 NOTE — ED TRIAGE NOTES
"Pt ETOH , pt not being cooperative, yelling screaming @ staff, not easy redirected. Nurses, [employer], & security @ bedside. Pt will not stay in the bed, refusing to let staff do VS, will not answer any questions. Kee SUAREZ here states,\" Patient stated , \" If I go to nursing home ,\" I will kill my self\".  "

## 2025-05-04 NOTE — PROGRESS NOTES
"Capacity Assessment Tool    \"Capacity\" is the \"ability\" to make a decision.  The decision in question must be specific (one decision), relevant to a patient's current condition (appropriate), and timely (neither prospective nor retrospective).    Capacity varies based on knowledge base (explanation/understanding of clinical information), cognitive processing, acute psychiatric illness, and other clinical conditions.    In order to be deemed \"capacitated\" to make a single decision at one point in time, a patient must demonstrate all 4 of the following elements:    *Ability to consistently communicate a choice (consistent over time with adequate information)  *Ability to understand the relevant information (accurate knowledge of condition)  *Ability to appreciate the situation and its consequences (risks/benefits, pros/cons)  *Ability to reason about treatment options (without undue influence of a person or condition, eg. suicidality or acute psychosis)      Current Decision    Clinical issue:     Clinically intoxicated, belligerent, noncooperative with nursing staff and inability to perform medical screening exam.    Did the appropriate team address relevant information with the patient:  Yes    Date: 5/4/2025    If \"NO\" is selected for appropriate team, then please discuss with the appropriate team.  The appropriate team should be encouraged to address relevant information with the patient AND reevaluate capacity when appropriate.    Capacity Evaluation    Patient demonstrates ability to consistently communicate choice:  No     Patient demonstrates ability to understand the relevant information:  No     Patient demonstrates ability to appreciate the situation and its consequences:  No     Patient demonstrates ability to reason about treatment options:  No     If ANY of the above items are answered \"NO,\" the patient LACKS CAPACITY for that specific decision at hand, at that specific time.  Further capacity " evaluations can be done as needed.

## 2025-05-05 NOTE — PROGRESS NOTES
Emergency Medicine Transition of Care Note.    I received Shreya Garces in signout from Dr. Graves.  Please see the previous ED provider note for all HPI, PE and MDM up to the time of signout at 0700. This is in addition to the primary record.    In brief Shreya Garces is an 34 y.o. female presenting for alcohol intoxication.  Chief Complaint   Patient presents with    Alcohol Intoxication     Pt ETOH , pt not being cooperative, yelling screaming @ staff, not easy redirected. Nurses, [employer], & security @ bedside. Pt will not stay in the bed, refusing to let staff do VS, will not answer any questions.     At the time of signout we were awaiting: sober re-evaluation.    ED Course as of 05/04/25 2109   Sun May 04, 2025   0606 I called her  Melisa in the chart [id number], went to unidentified voicemail, to attempt to find safe discharge home as patient is screaming and yelling and does not appear to be safe to be released [JH]   0642 I reevaluated patient several times, she is sleeping comfortably, breathing easily, arousable, still not allowing us to take vitals or initiate workup.  As patient has made no suicidal statements to us, but has just expressed extreme frustration with medical bills and that the  was called on her by the [employer]  and that she has to be here in the ED, will not insist on labs being drawn.  Will monitor closely and if mentation improves, may be able to metabolize and return home this a.m. [JH]   0742 POCT Glucose(!): 104 [FN]      ED Course User Index  [FN] Anastasiya Recio MD  [JH] Sukhwinder Walsh MD         Diagnoses as of 05/04/25 2109   Alcoholic intoxication without complication       Medical Decision Making  Patient is a 34-year-old female who presented to the emergency department for alcohol intoxication.  Please refer to previous providers H&P.  She had been drinking alcohol and intoxicated.  She had an argument with her [employer]  who called the police.   The police were to take her into custody but she stated that she would kill herself if she went to snf so they brought her to the emergency department.  Patient was signed out to me pending sober reevaluation.  Patient was observed in the emergency department for several hours.  She woke up and is clinically sober.  She states that she needs to leave so she can go to work.  She is not suicidal.  Her statements of killing herself are secondary to intoxication and circumstance rather than true intention.  Patient is cautioned against drinking alcohol if it continues to cause problems in her life.  Home care and return instructions discussed. Patient expressed understanding and agreement. Patient discharged in stable condition.        Final diagnoses:   [F10.920] Alcoholic intoxication without complication           Procedure  Procedures    Sahil García, DO

## 2025-05-15 ENCOUNTER — APPOINTMENT (OUTPATIENT)
Dept: RADIOLOGY | Facility: HOSPITAL | Age: 35
End: 2025-05-15

## 2026-02-11 ENCOUNTER — APPOINTMENT (OUTPATIENT)
Dept: NEUROLOGY | Facility: CLINIC | Age: 36
End: 2026-02-11